# Patient Record
Sex: FEMALE | Race: WHITE | NOT HISPANIC OR LATINO | Employment: STUDENT | URBAN - METROPOLITAN AREA
[De-identification: names, ages, dates, MRNs, and addresses within clinical notes are randomized per-mention and may not be internally consistent; named-entity substitution may affect disease eponyms.]

---
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
## 2019-01-30 ENCOUNTER — HOSPITAL ENCOUNTER (EMERGENCY)
Facility: HOSPITAL | Age: 19
Discharge: HOME | End: 2019-01-31
Attending: EMERGENCY MEDICINE
Payer: MEDICAID

## 2019-01-30 VITALS
HEIGHT: 62 IN | BODY MASS INDEX: 45.82 KG/M2 | DIASTOLIC BLOOD PRESSURE: 89 MMHG | RESPIRATION RATE: 16 BRPM | OXYGEN SATURATION: 98 % | TEMPERATURE: 98 F | HEART RATE: 100 BPM | WEIGHT: 249 LBS | SYSTOLIC BLOOD PRESSURE: 132 MMHG

## 2019-01-30 DIAGNOSIS — R10.9 LEFT FLANK PAIN: ICD-10-CM

## 2019-01-30 DIAGNOSIS — N94.89 FEMALE PELVIC CONGESTION SYNDROME: Primary | ICD-10-CM

## 2019-01-30 LAB
B-HCG UR QL: NEGATIVE
BACTERIA #/AREA URNS AUTO: ABNORMAL /HPF
BILIRUB UR QL STRIP: NEGATIVE
CLARITY UR REFRACT.AUTO: ABNORMAL
COLOR UR AUTO: YELLOW
CTP QC/QA: YES
CTP QC/QA: YES
GLUCOSE UR QL STRIP: NEGATIVE
HGB UR QL STRIP: NEGATIVE
HYALINE CASTS UR QL AUTO: 0 /LPF
KETONES UR QL STRIP: NEGATIVE
LEUKOCYTE ESTERASE UR QL STRIP: NEGATIVE
MICROSCOPIC COMMENT: ABNORMAL
NITRITE UR QL STRIP: NEGATIVE
PH UR STRIP: 5 [PH] (ref 5–8)
POC MOLECULAR INFLUENZA A AGN: NEGATIVE
POC MOLECULAR INFLUENZA B AGN: NEGATIVE
PROT UR QL STRIP: ABNORMAL
RBC #/AREA URNS AUTO: 4 /HPF (ref 0–4)
SP GR UR STRIP: 1.03 (ref 1–1.03)
SQUAMOUS #/AREA URNS AUTO: 5 /HPF
URN SPEC COLLECT METH UR: ABNORMAL
WBC #/AREA URNS AUTO: 7 /HPF (ref 0–5)

## 2019-01-30 PROCEDURE — 99284 EMERGENCY DEPT VISIT MOD MDM: CPT | Mod: ,,, | Performed by: EMERGENCY MEDICINE

## 2019-01-30 PROCEDURE — 81025 URINE PREGNANCY TEST: CPT | Performed by: EMERGENCY MEDICINE

## 2019-01-30 PROCEDURE — 99284 PR EMERGENCY DEPT VISIT,LEVEL IV: ICD-10-PCS | Mod: ,,, | Performed by: EMERGENCY MEDICINE

## 2019-01-30 PROCEDURE — 81001 URINALYSIS AUTO W/SCOPE: CPT

## 2019-01-30 PROCEDURE — 99284 EMERGENCY DEPT VISIT MOD MDM: CPT | Mod: 25

## 2019-01-30 PROCEDURE — 96365 THER/PROPH/DIAG IV INF INIT: CPT

## 2019-01-30 RX ORDER — SODIUM CHLORIDE 9 MG/ML
1000 INJECTION, SOLUTION INTRAVENOUS
Status: COMPLETED | OUTPATIENT
Start: 2019-01-30 | End: 2019-01-31

## 2019-01-31 LAB
ANION GAP SERPL CALC-SCNC: 13 MMOL/L
BACTERIA GENITAL QL WET PREP: ABNORMAL
BASOPHILS # BLD AUTO: 0.02 K/UL
BASOPHILS NFR BLD: 0.2 %
BUN SERPL-MCNC: 7 MG/DL
C TRACH DNA SPEC QL NAA+PROBE: NOT DETECTED
CALCIUM SERPL-MCNC: 9.5 MG/DL
CHLORIDE SERPL-SCNC: 107 MMOL/L
CLUE CELLS VAG QL WET PREP: ABNORMAL
CO2 SERPL-SCNC: 21 MMOL/L
CREAT SERPL-MCNC: 0.6 MG/DL
DIFFERENTIAL METHOD: ABNORMAL
EOSINOPHIL # BLD AUTO: 0.1 K/UL
EOSINOPHIL NFR BLD: 0.8 %
ERYTHROCYTE [DISTWIDTH] IN BLOOD BY AUTOMATED COUNT: 13.9 %
EST. GFR  (AFRICAN AMERICAN): >60 ML/MIN/1.73 M^2
EST. GFR  (NON AFRICAN AMERICAN): >60 ML/MIN/1.73 M^2
FILAMENT FUNGI VAG WET PREP-#/AREA: ABNORMAL
GLUCOSE SERPL-MCNC: 79 MG/DL
HCT VFR BLD AUTO: 42.9 %
HGB BLD-MCNC: 13.5 G/DL
IMM GRANULOCYTES # BLD AUTO: 0.02 K/UL
IMM GRANULOCYTES NFR BLD AUTO: 0.2 %
LYMPHOCYTES # BLD AUTO: 3.2 K/UL
LYMPHOCYTES NFR BLD: 33.7 %
MCH RBC QN AUTO: 26.4 PG
MCHC RBC AUTO-ENTMCNC: 31.5 G/DL
MCV RBC AUTO: 84 FL
MONOCYTES # BLD AUTO: 0.5 K/UL
MONOCYTES NFR BLD: 5.5 %
N GONORRHOEA DNA SPEC QL NAA+PROBE: NOT DETECTED
NEUTROPHILS # BLD AUTO: 5.6 K/UL
NEUTROPHILS NFR BLD: 59.6 %
NRBC BLD-RTO: 0 /100 WBC
PLATELET # BLD AUTO: 213 K/UL
PMV BLD AUTO: 12.2 FL
POTASSIUM SERPL-SCNC: 3.2 MMOL/L
RBC # BLD AUTO: 5.12 M/UL
SODIUM SERPL-SCNC: 141 MMOL/L
SPECIMEN SOURCE: ABNORMAL
T VAGINALIS GENITAL QL WET PREP: ABNORMAL
WBC # BLD AUTO: 9.44 K/UL
WBC #/AREA VAG WET PREP: ABNORMAL
YEAST GENITAL QL WET PREP: ABNORMAL

## 2019-01-31 PROCEDURE — 25000003 PHARM REV CODE 250: Performed by: EMERGENCY MEDICINE

## 2019-01-31 PROCEDURE — 80048 BASIC METABOLIC PNL TOTAL CA: CPT

## 2019-01-31 PROCEDURE — 85025 COMPLETE CBC W/AUTO DIFF WBC: CPT

## 2019-01-31 PROCEDURE — 87210 SMEAR WET MOUNT SALINE/INK: CPT

## 2019-01-31 PROCEDURE — 87491 CHLMYD TRACH DNA AMP PROBE: CPT

## 2019-01-31 PROCEDURE — 63600175 PHARM REV CODE 636 W HCPCS: Performed by: EMERGENCY MEDICINE

## 2019-01-31 RX ADMIN — SODIUM CHLORIDE 1000 ML: 0.9 INJECTION, SOLUTION INTRAVENOUS at 12:01

## 2019-01-31 RX ADMIN — PROMETHAZINE HYDROCHLORIDE 12.5 MG: 25 INJECTION INTRAMUSCULAR; INTRAVENOUS at 12:01

## 2019-01-31 NOTE — ED PROVIDER NOTES
"Encounter Date: 1/30/2019       History     Chief Complaint   Patient presents with    Pelvic Pain     Pt to ER c/o left sided "ovary" pain. She states hx of endometriosis. Pt also c/o cough, congestion, subjective fevers and vomiting over the past week.     MARCELLA Nowak is a 19 yo female o/w healthy here for L sided ovarian pain that started 2 days ago and for reevaluation of URI  That was diagnosed 3 days ago at PCP. Was given rx for multiple medications at PCP but patient reports she cant keep any of them down. She reports last urinated a large amount yesterday. Isnt able to keep water down. She denies urinary sx. Does report some nausea. Has very irregular periods, just started patch yesterday for birth control. She reports fever today but hasnt taken any medication for it.           Review of patient's allergies indicates:   Allergen Reactions    Aspartame Anaphylaxis    Bupropion Anaphylaxis    Chloral hydrate analogues Other (See Comments)     Adverse reaction per grandma    Imitrex [sumatriptan succinate] Anaphylaxis    Nitroglycerin Anaphylaxis    Reglan [metoclopramide hcl] Shortness Of Breath    Seroquel [quetiapine] Hallucinations    Tomato (solanum lycopersicum) Anaphylaxis and Shortness Of Breath    Unable to assess Anaphylaxis     Crabs/not allergic to iodine    Zantac [ranitidine hcl] Shortness Of Breath    Hibiclens (isopropyl alcohol) Itching    Abilify [aripiprazole]     Ambien [zolpidem]     Banana     Compazine [prochlorperazine edisylate]     Desmopressin     Eggplant     Honey     Kiwi (actinidia chinensis)     Lexapro [escitalopram oxalate]     Meclizine      hives    Remeron [mirtazapine]     Toradol [ketorolac]     Xanax [alprazolam]     Zofran [ondansetron hcl (pf)]     Mayonnaise Rash    Sulfa (sulfonamide antibiotics) Rash     Past Medical History:   Diagnosis Date    Asthma     Brain tumor, pineal gland     Depression     Depression     Depression in " pediatric patient 2014    Endometriosis     Otitis media recurrent    Precocious puberty     Pregnancy-induced hypertension in third trimester 3/3/2018    Seizures     Syncope, cardiogenic     Thyroid disease      Past Surgical History:   Procedure Laterality Date     SECTION      COLONOSCOPY N/A 2014    [facility]formed [facility] at [facility] (2ND FLR)    COLONOSCOPY W/ BIOPSIES      DELIVERY- SECTION N/A 2018    [facility] West [facility]    EGD (ESOPHAGOGASTRODUODENOSCOPY) N/A 2014    [facility] at [facility] (2ND FLR)    EGD (ESOPHAGOGASTRODUODENOSCOPY) N/A 10/11/2013    Performed by Hasmukh Caputo [facility] at [facility] END[facility] (2ND FLR)    ESOPHAGOGASTRODUODENOSCOPY      implantable cardiac monitor placement  2018    TONSILLECTOMY      TYMPANOSTOMY TUBE PLACEMENT       Family History   Problem Relation Age of Onset    Heart disease Mother     Hyperlipidemia Mother     Asthma Mother     Crohn's disease Mother 37        pending surgery. prior med; sulfasalazine    Depression Mother     Bipolar disorder Mother     Arthritis Mother     Anxiety disorder Mother     Other Mother         PTSD    Hypertension Maternal Grandmother     Asthma Maternal Grandmother     Depression Maternal Grandmother     Anxiety disorder Maternal Grandmother     Hypertension Paternal Grandmother     Diabetes Paternal Grandmother     Nephrolithiasis Father     Nephrolithiasis Brother     ADD / ADHD Brother     Depression Brother     Bipolar disorder Brother     Crohn's disease Maternal Grandfather     Alcohol abuse Paternal Grandfather     Breast cancer Neg Hx     Colon cancer Neg Hx     Ovarian cancer Neg Hx      Social History     Tobacco Use    Smoking status: Passive Smoke Exposure - Never Smoker    Smokeless tobacco: Never Used   Substance Use Topics    Alcohol use: No    Drug use: No     Review of Systems    Constitutional: Positive for activity change. Negative for fever.   HENT: Negative for congestion.    Respiratory: Negative for cough.    Gastrointestinal: Positive for abdominal pain, nausea and vomiting. Negative for diarrhea.   Genitourinary: Positive for decreased urine volume and vaginal discharge. Negative for dysuria.   Musculoskeletal: Negative for myalgias.   Skin: Negative for rash.       Physical Exam     Initial Vitals [01/30/19 2300]   BP Pulse Resp Temp SpO2   132/89 100 16 98.4 °F (36.9 °C) 98 %      MAP       --         Physical Exam    Vitals reviewed.  Constitutional: She appears well-developed and well-nourished. No distress.   HENT:   Head: Normocephalic.   Mouth/Throat: Oropharynx is clear and moist.   Eyes: Conjunctivae are normal.   Cardiovascular: Normal rate, regular rhythm, normal heart sounds and intact distal pulses.   Pulmonary/Chest: Breath sounds normal. No respiratory distress. She has no wheezes.   Abdominal: Soft. There is tenderness.   + TTP LLQ, no acute abdomen    Genitourinary: Vaginal discharge found.   Genitourinary Comments: Scant thin discharge noted on pelvic exam   Musculoskeletal: Normal range of motion.   Neurological: She is alert.   Skin: Skin is warm and dry. Capillary refill takes less than 2 seconds.   Psychiatric: She has a normal mood and affect.         ED Course   Procedures  Labs Reviewed   URINALYSIS, REFLEX TO URINE CULTURE - Abnormal; Notable for the following components:       Result Value    Appearance, UA Cloudy (*)     Protein, UA 1+ (*)     All other components within normal limits    Narrative:     Preferred Collection Type->Urine, Clean Catch   URINALYSIS MICROSCOPIC - Abnormal; Notable for the following components:    WBC, UA 7 (*)     Bacteria, UA Many (*)     All other components within normal limits    Narrative:     Preferred Collection Type->Urine, Clean Catch   POCT URINE PREGNANCY - Normal   C. TRACHOMATIS/N. GONORRHOEAE BY AMP DNA   CBC W/  AUTO DIFFERENTIAL   BASIC METABOLIC PANEL   POCT INFLUENZA A/B MOLECULAR          Imaging Results    None          Medical Decision Making:   History:   I obtained history from: someone other than patient.  Old Medical Records: I decided to obtain old medical records.  Initial Assessment:   Eliu presents for emergent evaluation of LLQ Pain and URI sx. Her exam is reassuring, she is non toxic appearing and not clinically dehydrated. Discussed plan with patient for labs, fluids, US and reevalaution.   Differential Diagnosis:   Ovarian cyst, torsion, PID, UTI  Clinical Tests:   Lab Tests: Ordered and Reviewed  Radiological Study: Ordered and Reviewed  ED Management:  Patient seen and examined, imaging and labs ordered. No emesis here. Labs all reassuring. Discussed results with patient for pelvic congestion syndrome- given that she just started her patch a few days ago, the change in the hormones may be causing sx. She feels much better. Discussed follow up.                       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 A diagnosis of Left flank pain was also pertinent to this visit.      Disposition:   Disposition: Discharged  Condition: Stable                        Jody Mena MD  02/02/19 [id number]

## 2019-01-31 NOTE — DISCHARGE INSTRUCTIONS
Family aware to return for persistent fever, development of respiratory distress, change in mental status, decreased UOP, or any other acute medical issue requiring immediate attention.

## 2019-02-19 ENCOUNTER — OFFICE VISIT (OUTPATIENT)
Dept: OBSTETRICS AND GYNECOLOGY | Facility: CLINIC | Age: 19
End: 2019-02-19
Payer: MEDICAID

## 2019-02-19 VITALS
SYSTOLIC BLOOD PRESSURE: 140 MMHG | HEIGHT: 63 IN | BODY MASS INDEX: 43.83 KG/M2 | DIASTOLIC BLOOD PRESSURE: 84 MMHG | WEIGHT: 247.38 LBS

## 2019-02-19 DIAGNOSIS — N94.6 DYSMENORRHEA: ICD-10-CM

## 2019-02-19 DIAGNOSIS — E66.3 OVERWEIGHT: Primary | ICD-10-CM

## 2019-02-19 PROBLEM — N30.00 ACUTE CYSTITIS WITHOUT HEMATURIA: Status: RESOLVED | Noted: 2018-05-03 | Resolved: 2019-02-19

## 2019-02-19 PROBLEM — Z34.93 PREGNANCY WITH ONE FETUS IN THIRD TRIMESTER: Status: RESOLVED | Noted: 2018-01-24 | Resolved: 2019-02-19

## 2019-02-19 PROBLEM — Z98.891 S/P CESAREAN SECTION: Status: RESOLVED | Noted: 2018-02-28 | Resolved: 2019-02-19

## 2019-02-19 PROBLEM — Z34.90 PREGNANCY WITH ONE FETUS: Status: RESOLVED | Noted: 2018-02-07 | Resolved: 2019-02-19

## 2019-02-19 PROCEDURE — 99213 OFFICE O/P EST LOW 20 MIN: CPT | Mod: S$PBB,,, | Performed by: STUDENT IN AN ORGANIZED HEALTH CARE EDUCATION/TRAINING PROGRAM

## 2019-02-19 PROCEDURE — 99999 PR PBB SHADOW E&M-EST. PATIENT-LVL II: ICD-10-PCS | Mod: PBBFAC,,, | Performed by: STUDENT IN AN ORGANIZED HEALTH CARE EDUCATION/TRAINING PROGRAM

## 2019-02-19 PROCEDURE — 99213 PR OFFICE/OUTPT VISIT, EST, LEVL III, 20-29 MIN: ICD-10-PCS | Mod: S$PBB,,, | Performed by: STUDENT IN AN ORGANIZED HEALTH CARE EDUCATION/TRAINING PROGRAM

## 2019-02-19 PROCEDURE — 99999 PR PBB SHADOW E&M-EST. PATIENT-LVL II: CPT | Mod: PBBFAC,,, | Performed by: STUDENT IN AN ORGANIZED HEALTH CARE EDUCATION/TRAINING PROGRAM

## 2019-02-19 PROCEDURE — 99212 OFFICE O/P EST SF 10 MIN: CPT | Mod: PBBFAC,PO | Performed by: STUDENT IN AN ORGANIZED HEALTH CARE EDUCATION/TRAINING PROGRAM

## 2019-02-19 RX ORDER — DEXAMETHASONE 0.75 MG/1
TABLET ORAL
COMMUNITY
Start: 2019-02-18 | End: 2023-12-15

## 2019-02-19 RX ORDER — CIPROFLOXACIN 500 MG/1
TABLET ORAL
COMMUNITY
Start: 2019-02-18 | End: 2023-12-15

## 2019-02-19 RX ORDER — NORETHINDRONE 5 MG/1
5 TABLET ORAL DAILY
Qty: 30 TABLET | Refills: 11 | Status: SHIPPED | OUTPATIENT
Start: 2019-02-19 | End: 2019-02-19 | Stop reason: DRUGHIGH

## 2019-02-19 RX ORDER — PROMETHAZINE HYDROCHLORIDE 25 MG/1
TABLET ORAL
Refills: 3 | COMMUNITY
Start: 2019-01-28 | End: 2019-02-22 | Stop reason: SDUPTHER

## 2019-02-19 RX ORDER — NORETHINDRONE 5 MG/1
10 TABLET ORAL DAILY
Qty: 60 TABLET | Refills: 11 | Status: SHIPPED | OUTPATIENT
Start: 2019-02-19 | End: 2019-02-22

## 2019-02-19 NOTE — PROGRESS NOTES
History & Physical  Gynecology      SUBJECTIVE:     Chief Complaint: Consult (Endometriosis) and Pelvic Pain (3/10 due to left ovary)       History of Present Illness:  Eliu Hunter is a 18 y.o. [id number] who presents with pelvic pain. Patient reports that she as diagnosed with endometriosis 4 years ago during exploratory laparoscopy. She has daily pain which rates anywhere from 4-10/10 however, the pain is constant regardless and there is no time throughout her day where she has no pain. At times, she finds it difficult to get out of bed because of the pain. She has tried many different medications including OCPs, birth control patch and progesterone for her symptoms with little relief. She was on Lupron in the past however, this was for the treatment of precocious puberty. Currently she is on Xulane currently for contraceptive purposes however, does not feel that it helps with her pain or menstrual cycles. 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 She reports menses occurs 4-5x week with her cycles lasting 4-5 days at times while at other times, it lasts 2 weeks. She uses 7 tampons/hour at times to control her bleeding. [place] has been difficult for her given her persistent bleeding and pelvic pain. She denies any light headedness, dizziness or weakness. Additionally, she has had multiple colonoscopies performed secondary to persistent nausea and vomiting. She has seen several doctors including Dr. Trujillo and Dr. Castanon at [facility] for this problem with little relief and the patient is very unhappy that she continues to suffer.     Review of patient's allergies indicates:   Allergen Reactions    Aspartame Anaphylaxis     Other reaction(s): UNKNOWN    Bupropion Anaphylaxis    Chloral hydrate analogues Other (See Comments)     Adverse reaction per grandma    Imitrex [sumatriptan succinate] Anaphylaxis    Nitroglycerin Anaphylaxis     Other reaction(s):  stop breathing    Quetiapine Hallucinations     Other reaction(s): HALLUCINATION    Reglan [metoclopramide hcl] Shortness Of Breath    Tomato (solanum lycopersicum) Anaphylaxis and Shortness Of Breath    Unable to assess Anaphylaxis     Crabs/not allergic to iodine    Zantac [ranitidine hcl] Shortness Of Breath    Hibiclens (isopropyl alcohol) Itching    Abilify [aripiprazole]     Ambien [zolpidem]     Banana      Other reaction(s): STOP BREATHING    Chloral hydrate      Other reaction(s): adverse affect    Compazine [prochlorperazine edisylate]     Crab      Other reaction(s): STOP BREATHING    Desmopressin     Eggplant      Other reaction(s): EGG PLANT    Honey      Other reaction(s): LIP SWELLING    Ketorolac      Other reaction(s): RASH    Kiwi (actinidia chinensis)     Lexapro [escitalopram oxalate]     Meclizine      hives    Metoclopramide      Other reaction(s): SHORTNESS OF BREATH    Ondansetron      Other reaction(s): RASH    Prochlorperazine      Other reaction(s): RASH    Remeron [mirtazapine]     Sucralose      Other reaction(s): STOPS BREATHING    Tomato      Other reaction(s): stop breathing    Xanax [alprazolam]     Zofran [ondansetron hcl (pf)]     Mayonnaise Rash     Other reaction(s): STOP BREATHING    Sulfa (sulfonamide antibiotics) Rash and Nausea And Vomiting       Past Medical History:   Diagnosis Date    Asthma     Brain tumor, pineal gland     Depression     Endometriosis     Precocious puberty     Seizures     Syncope, cardiogenic     Thyroid disease      Past Surgical History:   Procedure Laterality Date     SECTION      COLONOSCOPY N/A 2014    [facility]    COLONOSCOPY W/ BIOPSIES      DELIVERY- SECTION N/A 2018    [facility] at [facility] L[facility]D [facility]    EGD (ESOPHAGOGASTRODUODENOSCOPY) N/A 2014    [facility] (2ND FLR)     EGD (ESOPHAGOGASTRODUODENOSCOPY) N/A 10/11/2013    [facility] ([facility]    ESOPHAGOGASTRODUODENOSCOPY      implantable cardiac monitor placement  2018    TONSILLECTOMY      TYMPANOSTOMY TUBE PLACEMENT       OB History      Para Term  AB Living    1 1 1   0 1    SAB TAB Ectopic Multiple Live Births    0     0 1        Family History   Problem Relation Age of Onset    Heart disease Mother     Hyperlipidemia Mother     Asthma Mother     Crohn's disease Mother 37        pending surgery. prior med; sulfasalazine    Depression Mother     Bipolar disorder Mother     Arthritis Mother     Anxiety disorder Mother     Other Mother         PTSD    Hypertension Maternal Grandmother     Asthma Maternal Grandmother     Depression Maternal Grandmother     Anxiety disorder Maternal Grandmother     Hypertension Paternal Grandmother     Diabetes Paternal Grandmother     Nephrolithiasis Father     Nephrolithiasis Brother     ADD / ADHD Brother     Depression Brother     Bipolar disorder Brother     Crohn's disease Maternal Grandfather     Alcohol abuse Paternal Grandfather     Breast cancer Neg Hx     Colon cancer Neg Hx     Ovarian cancer Neg Hx      Social History     Tobacco Use    Smoking status: Passive Smoke Exposure - Never Smoker    Smokeless tobacco: Never Used   Substance Use Topics    Alcohol use: No    Drug use: No       Current Outpatient Medications   Medication Sig    ciprofloxacin HCl (CIPRO) 500 MG tablet     clindamycin (CLEOCIN) 300 MG capsule Take 300 mg by mouth every 6 (six) hours.    dexamethasone (DECADRON) 0.75 MG Tab     epinephrine (EPIPEN INJ) Inject as directed.    HYDROcodone-acetaminophen (NORCO) 7.5-325 mg per tablet Take 1 tablet by mouth every 6 (six) hours as needed for Pain.    meclizine (ANTIVERT) 25 mg tablet Take 1 tablet (25 mg total) by mouth 3 (three) times daily as needed for Nausea.     metoprolol succinate (TOPROL-XL) 25 MG 24 hr tablet Take 1 tablet (25 mg total) by mouth once daily.    polyethylene glycol (COLYTE) 240-22.72-6.72 -5.84 gram SolR TK UTD    promethazine (PHENERGAN) 12.5 MG Tab Take 12.5 mg by mouth as needed.    promethazine (PHENERGAN) 25 MG tablet TK 1 T PO  Q 6 H PRN N    PROVENTIL HFA 90 mcg/actuation inhaler INL 2 PFS PO Q 4 H PRF COUGH OR WHZ    sumatriptan (IMITREX STATDOSE) 6 mg/0.5 mL kit Inject 0.5 mLs (6 mg total) into the skin once.    traMADol (ULTRAM) 50 mg tablet Take 50 mg by mouth as needed.    XULANE 150-35 mcg/24 hr LISA 1 PA EXT TO THE SKIN WEEKLY FOR 21 DAYS     No current facility-administered medications for this visit.          Review of Systems:  Review of Systems   Constitutional: Negative for chills, diaphoresis and fever.   Respiratory: Negative for cough, shortness of breath and wheezing.    Cardiovascular: Negative for chest pain, palpitations and leg swelling.   Gastrointestinal: Negative for abdominal pain, constipation, diarrhea and nausea.   Genitourinary: Positive for menstrual problem, pelvic pain and vaginal bleeding. Negative for dysuria, hematuria, vaginal discharge, vaginal pain, vaginal dryness and vaginal odor.   Musculoskeletal: Negative for back pain.   Neurological: Negative for syncope and headaches.        OBJECTIVE:     Physical Exam:  Physical Exam   Constitutional: She is oriented to person, place, and time. She appears well-developed and well-nourished.   HENT:   Head: Normocephalic and atraumatic.   Nose: Nose normal.   Cardiovascular: Normal rate, regular rhythm and intact distal pulses.   Pulmonary/Chest: Effort normal and breath sounds normal. No respiratory distress.   Abdominal: Soft. She exhibits no distension. There is no tenderness.   Genitourinary:   Genitourinary Comments: Deferred   Neurological: She is oriented to person, place, and time. No cranial nerve deficit.   Skin: Skin is warm and dry. Capillary refill  takes less than 2 seconds.   Psychiatric: She has a normal mood and affect. Her behavior is normal. Judgment and thought content normal.   Vitals reviewed.        ASSESSMENT:       ICD-10-CM ICD-9-CM    1. Overweight E66.3 278.02    2. Dysmenorrhea N94.6 625.3    3. Precocious puberty E30.1 259.1           Plan:      1. Dysmenorrhea  - Patient presenting with painful monthly periods on setting of dysmenorrhea and menorrhagia and endometriosis diagnosed via laparoscopy, per patient  - VSS, Last H/h in 01/2019: 13.5/42.9  - Currently patient using Xulane for contraception  - Pelvic US report reviewed: suggestive for pelvic congestion syndrome   - Advised patient to continue use of Xulane continuously for endometriosis and contraceptive purposes but to also start Aygestin 10 mg daily for treatment of pelvic congestion syndrome  - Will re-evaluate in 3 months for improvement of symptoms    Return to clinic in 3 months for further evaluation and for follow up of symptom improvement

## 2019-02-22 ENCOUNTER — OFFICE VISIT (OUTPATIENT)
Dept: ELECTROPHYSIOLOGY | Facility: CLINIC | Age: 19
End: 2019-02-22
Payer: MEDICAID

## 2019-02-22 ENCOUNTER — HOSPITAL ENCOUNTER (OUTPATIENT)
Dept: CARDIOLOGY | Facility: CLINIC | Age: 19
Discharge: HOME | End: 2019-02-22
Payer: MEDICAID

## 2019-02-22 VITALS
SYSTOLIC BLOOD PRESSURE: 140 MMHG | HEIGHT: 62 IN | WEIGHT: 248 LBS | BODY MASS INDEX: 45.64 KG/M2 | DIASTOLIC BLOOD PRESSURE: 92 MMHG | HEART RATE: 78 BPM

## 2019-02-22 DIAGNOSIS — R51.9 CHRONIC NONINTRACTABLE HEADACHE, UNSPECIFIED HEADACHE TYPE: Primary | ICD-10-CM

## 2019-02-22 DIAGNOSIS — R55 SYNCOPE, UNSPECIFIED SYNCOPE TYPE: ICD-10-CM

## 2019-02-22 DIAGNOSIS — R55 SYNCOPE AND COLLAPSE: ICD-10-CM

## 2019-02-22 DIAGNOSIS — D49.6 BRAIN TUMOR: ICD-10-CM

## 2019-02-22 DIAGNOSIS — R55 VASOVAGAL SYNCOPE: ICD-10-CM

## 2019-02-22 DIAGNOSIS — Z98.890 HISTORY OF LOOP RECORDER: ICD-10-CM

## 2019-02-22 DIAGNOSIS — R00.2 PALPITATIONS: ICD-10-CM

## 2019-02-22 DIAGNOSIS — G89.29 CHRONIC NONINTRACTABLE HEADACHE, UNSPECIFIED HEADACHE TYPE: Primary | ICD-10-CM

## 2019-02-22 PROCEDURE — 93010 RHYTHM STRIP: ICD-10-PCS | Mod: S$PBB,,, | Performed by: INTERNAL MEDICINE

## 2019-02-22 PROCEDURE — 99214 PR OFFICE/OUTPT VISIT, EST, LEVL IV, 30-39 MIN: ICD-10-PCS | Mod: S$PBB,,, | Performed by: INTERNAL MEDICINE

## 2019-02-22 PROCEDURE — 99214 OFFICE O/P EST MOD 30 MIN: CPT | Mod: S$PBB,,, | Performed by: INTERNAL MEDICINE

## 2019-02-22 PROCEDURE — 99999 PR PBB SHADOW E&M-EST. PATIENT-LVL III: ICD-10-PCS | Mod: PBBFAC,,, | Performed by: INTERNAL MEDICINE

## 2019-02-22 PROCEDURE — 99213 OFFICE O/P EST LOW 20 MIN: CPT | Mod: PBBFAC,25 | Performed by: INTERNAL MEDICINE

## 2019-02-22 PROCEDURE — 93005 ELECTROCARDIOGRAM TRACING: CPT | Mod: PBBFAC | Performed by: INTERNAL MEDICINE

## 2019-02-22 PROCEDURE — 99999 PR PBB SHADOW E&M-EST. PATIENT-LVL III: CPT | Mod: PBBFAC,,, | Performed by: INTERNAL MEDICINE

## 2019-02-22 PROCEDURE — 93010 ELECTROCARDIOGRAM REPORT: CPT | Mod: S$PBB,,, | Performed by: INTERNAL MEDICINE

## 2019-02-22 RX ORDER — METOPROLOL SUCCINATE 25 MG/1
25 TABLET, EXTENDED RELEASE ORAL DAILY
Qty: 90 TABLET | Refills: 3 | Status: SHIPPED | OUTPATIENT
Start: 2019-02-22 | End: 2019-06-19 | Stop reason: SDUPTHER

## 2019-02-22 RX ORDER — GENTAMICIN SULFATE 3 MG/ML
SOLUTION/ DROPS OPHTHALMIC
Refills: 2 | COMMUNITY
Start: 2019-02-18 | End: 2023-12-15

## 2019-02-22 NOTE — PROGRESS NOTES
Subjective:    Patient ID:  Eliu Hunter is a 18 y.o. female who presents for evaluation of Vasovagal Syncope      Chest Pain    Associated symptoms include palpitations. Pertinent negatives include no abdominal pain, dizziness, malaise/fatigue, near-syncope, shortness of breath, syncope or weakness.   Pertinent negatives for past medical history include no muscle weakness.      18 y.o. 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 Sometimes has very little prodrome, if any.  Happened up to 2x/week. No change with pregnancy.  Usually while standing, but has had LOC twice while lying down.  Once, hit head with syncope. She doesn't drive , due to sync hx.    Also has palpitations at times, especially at night.  Also c/o severe episodic HA, focused on L side of head and retroorbital. She says it feels similar to the HAs she was getting when she had the pineal tumor at age 6.    30-day monitor: SR/ST, including during sx. No syncope during that period.  Neuro eval is underway. By report, considering further imaging and  shunt (?). She'd like to see an [facility] neurologist.  BP has been in 140s, with fluctuations.    [facility] done by Dr [facility] at [facility]: baseline tilt negative. SL NTG -> hypotension, HR 68, LOC.  echo 55-60%. normal LA.  Holter: SR/ST. Longest RR: 1670 ms.  ILR: SR/ST.    My interpretation of today's ECG is ST 110s      Review of Systems   Constitution: Negative. Negative for weakness and malaise/fatigue.   HENT: Negative.  Negative for ear pain and tinnitus.    Eyes: Negative for blurred vision.   Cardiovascular: Positive for palpitations. Negative for dyspnea on exertion, near-syncope and syncope.   Respiratory: Negative.  Negative for shortness of breath.    Endocrine: Negative.  Negative for polyuria.    Hematologic/Lymphatic: Does not bruise/bleed easily.   Skin: Negative.  Negative for rash.   Musculoskeletal: Negative.  Negative for joint pain and muscle weakness.   Gastrointestinal: Negative.  Negative for abdominal pain and change in bowel habit.   Genitourinary: Negative for frequency.   Neurological: Negative.  Negative for dizziness.   Psychiatric/Behavioral: Negative.  Negative for depression. The patient is not nervous/anxious.    Allergic/Immunologic: Negative for environmental allergies.        Objective:    Physical Exam   Constitutional: She is oriented to person, place, and time. Vital signs are normal. She appears well-developed and well-nourished. She is active and cooperative.   HENT:   Head: Normocephalic and atraumatic.   Eyes: Conjunctivae and EOM are normal.   Neck: Normal range of motion. Carotid bruit is not present. No tracheal deviation and no edema present. No thyroid mass and no thyromegaly present.   Cardiovascular: Normal rate, regular rhythm, normal heart sounds, intact distal pulses and normal pulses.  No extrasystoles are present. PMI is not displaced. Exam reveals no gallop and no friction rub.   No murmur heard.  Pulmonary/Chest: Effort normal and breath sounds normal. No respiratory distress. She has no wheezes. She has no rales.   Abdominal: Soft. Normal appearance. She exhibits no distension. There is no hepatosplenomegaly.   gravid abd   Musculoskeletal: Normal range of motion.   Neurological: She is alert and oriented to person, place, and time. Coordination normal.   Skin: Skin is warm and dry. No rash noted.   Psychiatric: She has a normal mood and affect. Her speech is normal and behavior is normal. Thought content normal. Cognition and memory are normal.   Nursing note and vitals reviewed.        Assessment:       1. Chronic nonintractable headache, unspecified headache type    2. Brain tumor         Plan:       Most likely vasovagal, given normal heart, normal ECG.  However, very short prodrome makes arrhythmic cause possible too. Nothing on 30-day monitor or ILR.  Continue to follow on ILR.    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 Referral to an [facility] neurologist, per pt request.    HTN, relative tachycardia, palpitations: trial of low-dose BB.    Return in 1 year, or earlier prn.

## 2019-02-25 ENCOUNTER — PATIENT MESSAGE (OUTPATIENT)
Dept: OBSTETRICS AND GYNECOLOGY | Facility: CLINIC | Age: 19
End: 2019-02-25

## 2019-02-25 ENCOUNTER — CLINICAL SUPPORT (OUTPATIENT)
Dept: CARDIOLOGY | Facility: HOSPITAL | Age: 19
End: 2019-02-25
Attending: INTERNAL MEDICINE
Payer: MEDICAID

## 2019-02-25 DIAGNOSIS — R55 SYNCOPE AND COLLAPSE: Primary | ICD-10-CM

## 2019-02-25 DIAGNOSIS — Z95.818 STATUS POST PLACEMENT OF IMPLANTABLE LOOP RECORDER: ICD-10-CM

## 2019-02-25 PROCEDURE — 93299 CARDIAC DEVICE CHECK - REMOTE: CPT

## 2019-03-26 ENCOUNTER — HOSPITAL ENCOUNTER (EMERGENCY)
Facility: HOSPITAL | Age: 19
Discharge: HOME | End: 2019-03-26
Attending: INTERNAL MEDICINE
Payer: MEDICAID

## 2019-03-26 VITALS
DIASTOLIC BLOOD PRESSURE: 77 MMHG | BODY MASS INDEX: 45.08 KG/M2 | SYSTOLIC BLOOD PRESSURE: 140 MMHG | WEIGHT: 245 LBS | TEMPERATURE: 98 F | OXYGEN SATURATION: 99 % | HEIGHT: 62 IN | RESPIRATION RATE: 20 BRPM | HEART RATE: 108 BPM

## 2019-03-26 DIAGNOSIS — R07.89 LEFT-SIDED CHEST WALL PAIN: Primary | ICD-10-CM

## 2019-03-26 LAB
B-HCG UR QL: NEGATIVE
CTP QC/QA: YES

## 2019-03-26 PROCEDURE — 99283 EMERGENCY DEPT VISIT LOW MDM: CPT | Mod: ER

## 2019-03-26 PROCEDURE — 81025 URINE PREGNANCY TEST: CPT | Mod: ER | Performed by: INTERNAL MEDICINE

## 2019-03-27 NOTE — ED PROVIDER NOTES
"Encounter Date: 3/26/2019    SCRIBE #1 NOTE: I, Elissa Bowser, am scribing for, and in the presence of,  Dr. Forrest. I have scribed the following portions of the note - Other sections scribed: HPI, ROS, PE.       History     Chief Complaint   Patient presents with    Chest Pain     pt presents with c/o SOB/L, sided CP which began 20 min ago; reports pain worsens with cough, deep breathing, palpation; pt reports productive cough with yellow sputum x3.5 weeks; pt also c/o R, ear pain; recent Dx of ear infection; pt reports vomiting x2 episodes since CP began; pt reports lightheadedness; pain 7/10 on pain scale; pain described as pressure radiating down LUE and up to L, side of neck; Hx of Neurogenic Cardiavascular Syncope; denies recent syncopal episodes     Eliu Hunter is a 19 y.o. female who presents to the ED complaining of CP that began 30 min ago. She reports that it's a 6/10, and describes it as "pressure" that is worsened by pressing on the area and by deep breathing. She also has a cough, but denies smoking.        Review of patient's allergies indicates:   Allergen Reactions    Advair diskus [fluticasone-salmeterol] Anaphylaxis    Aspartame Anaphylaxis     Other reaction(s): UNKNOWN    Bupropion Anaphylaxis    Chloral hydrate analogues Other (See Comments)     Adverse reaction per grandma    Imitrex [sumatriptan succinate] Anaphylaxis    Ugo Anaphylaxis    Metoprolol Shortness Of Breath    Nitroglycerin Anaphylaxis     Other reaction(s): stop breathing    Quetiapine Hallucinations     Other reaction(s): HALLUCINATION    Reglan [metoclopramide hcl] Shortness Of Breath    Tomato (solanum lycopersicum) Anaphylaxis and Shortness Of Breath    Unable to assess Anaphylaxis     Crabs/not allergic to iodine    Zantac [ranitidine hcl] Shortness Of Breath    Hibiclens (isopropyl alcohol) Itching    Abilify [aripiprazole]     Ambien [zolpidem]     Banana      Other reaction(s): STOP BREATHING    " Chloral hydrate      Other reaction(s): adverse affect    Compazine [prochlorperazine edisylate]     Crab      Other reaction(s): STOP BREATHING    Desmopressin     Eggplant      Other reaction(s): EGG PLANT    Honey      Other reaction(s): LIP SWELLING    Ketorolac      Other reaction(s): RASH    Kiwi (actinidia chinensis)     Lexapro [escitalopram oxalate]     Meclizine      hives    Metoclopramide      Other reaction(s): SHORTNESS OF BREATH    Ondansetron      Other reaction(s): RASH    Prochlorperazine      Other reaction(s): RASH    Remeron [mirtazapine]     Sucralose      Other reaction(s): STOPS BREATHING    Tomato      Other reaction(s): stop breathing    Xanax [alprazolam]     Zofran [ondansetron hcl (pf)]     Mayonnaise Rash     Other reaction(s): STOP BREATHING    Sulfa (sulfonamide antibiotics) Rash and Nausea And Vomiting     Past Medical History:   Diagnosis Date    Asthma     Brain tumor, pineal gland     Depression     Endometriosis     Hypertension     Precocious puberty     Seizures     Syncope, cardiogenic     Thyroid disease      Past Surgical History:   Procedure Laterality Date     SECTION      COLONOSCOPY N/A 2014    [facility]    COLONOSCOPY W/ BIOPSIES      DELIVERY- SECTION N/A 2018    Performed by West Garcia [facility]    EGD (ESOPHAGOGASTRODUODENOSCOPY) N/A 2014    [facility] (2ND FLR)    EGD (ESOPHAGOGASTRODUODENOSCOPY) N/A 10/11/2013    [facility] (2ND FLR)    ESOPHAGOGASTRODUODENOSCOPY      implantable cardiac monitor placement  2018    TONSILLECTOMY      TYMPANOSTOMY TUBE PLACEMENT       Family History   Problem Relation Age of Onset    Heart disease Mother     Hyperlipidemia Mother     Asthma Mother     Crohn's disease Mother 37        pending surgery. prior med;  sulfasalazine    Depression Mother     Bipolar disorder Mother     Arthritis Mother     Anxiety disorder Mother     Other Mother         PTSD    Hypertension Maternal Grandmother     Asthma Maternal Grandmother     Depression Maternal Grandmother     Anxiety disorder Maternal Grandmother     Hypertension Paternal Grandmother     Diabetes Paternal Grandmother     Nephrolithiasis Father     Nephrolithiasis Brother     ADD / ADHD Brother     Depression Brother     Bipolar disorder Brother     Crohn's disease Maternal Grandfather     Alcohol abuse Paternal Grandfather     Breast cancer Neg Hx     Colon cancer Neg Hx     Ovarian cancer Neg Hx      Social History     Tobacco Use    Smoking status: Passive Smoke Exposure - Never Smoker    Smokeless tobacco: Never Used   Substance Use Topics    Alcohol use: No    Drug use: No     Review of Systems   Respiratory: Positive for cough.    Cardiovascular: Positive for chest pain.   All other systems reviewed and are negative.      Physical Exam     Initial Vitals [03/26/19 2229]   BP Pulse Resp Temp SpO2   (!) 146/93 109 18 98.3 °F (36.8 °C) 100 %      MAP       --         Physical Exam    Nursing note and vitals reviewed.  Constitutional: She appears well-developed and well-nourished. She is Obese .   HENT:   Head: Normocephalic and atraumatic.   Right Ear: External ear normal.   Left Ear: External ear normal.   Nose: Nose normal.   Eyes: Conjunctivae are normal.   Neck: Normal range of motion. Neck supple.   Cardiovascular: Normal rate and intact distal pulses.   Pulmonary/Chest: Effort normal and breath sounds normal. No respiratory distress. She has no wheezes. She has no rhonchi. She has no rales. She exhibits tenderness (left chest wall).   Lungs clear bilaterally.   Abdominal: Soft. There is no tenderness.   Musculoskeletal: Normal range of motion.   Neurological: She is alert and oriented to person, place, and time.   Skin: Skin is warm and dry.  "Capillary refill takes less than 2 seconds.   Psychiatric: She has a normal mood and affect. Her behavior is normal.         ED Course   Procedures  Labs Reviewed   POCT URINE PREGNANCY     EKG Readings: (Independently Interpreted)   Initial Reading: No STEMI. Rhythm: Sinus Tachycardia. Heart Rate: 109. Ectopy: No Ectopy. Conduction: Normal. ST Segments: Normal ST Segments.       Imaging Results    None          Medical Decision Making:   History:   Old Medical Records: I decided to obtain old medical records.  Initial Assessment:   Eliu Hunter is a 19 y.o. female who presents to the ED complaining of CP that began 30 min ago. She reports that it's a 6/10, and describes it as "pressure" that is worsened by pressing on the area and by deep breathing. She also has a cough, but denies smoking.  Clinical Tests:   Lab Tests: Ordered and Reviewed            Scribe Attestation:   Scribe #1: I performed the above scribed service and the documentation accurately describes the services I performed. I attest to the accuracy of the note.    This document was produced by a scribe under my direction and in my presence. I agree with the content of the note and have made any necessary edits.     Dr. Forrest    03/27/2019 2:20 AM]           Clinical Impression:     1. Left-sided chest wall pain            Disposition:   Disposition: Discharged  Condition: Stable                        Andre Forrest MD  03/27/19 0221    "

## 2019-04-03 ENCOUNTER — PATIENT MESSAGE (OUTPATIENT)
Dept: OBSTETRICS AND GYNECOLOGY | Facility: CLINIC | Age: 19
End: 2019-04-03

## 2019-06-18 ENCOUNTER — HOSPITAL ENCOUNTER (EMERGENCY)
Facility: HOSPITAL | Age: 19
Discharge: HOME | End: 2019-06-18
Attending: EMERGENCY MEDICINE
Payer: MEDICAID

## 2019-06-18 VITALS
RESPIRATION RATE: 18 BRPM | HEIGHT: 63 IN | SYSTOLIC BLOOD PRESSURE: 144 MMHG | WEIGHT: 240 LBS | DIASTOLIC BLOOD PRESSURE: 86 MMHG | BODY MASS INDEX: 42.52 KG/M2 | HEART RATE: 74 BPM | OXYGEN SATURATION: 99 % | TEMPERATURE: 98 F

## 2019-06-18 DIAGNOSIS — Z87.42 HISTORY OF ENDOMETRIOSIS: ICD-10-CM

## 2019-06-18 DIAGNOSIS — R10.32 LLQ PAIN: Primary | ICD-10-CM

## 2019-06-18 LAB
ALBUMIN SERPL BCP-MCNC: 3.2 G/DL (ref 3.5–5.2)
ALP SERPL-CCNC: 60 U/L (ref 55–135)
ALT SERPL W/O P-5'-P-CCNC: 29 U/L (ref 10–44)
ANION GAP SERPL CALC-SCNC: 10 MMOL/L (ref 8–16)
AST SERPL-CCNC: 41 U/L (ref 10–40)
B-HCG UR QL: NEGATIVE
BACTERIA GENITAL QL WET PREP: ABNORMAL
BASOPHILS # BLD AUTO: 0.01 K/UL (ref 0–0.2)
BASOPHILS NFR BLD: 0.1 % (ref 0–1.9)
BILIRUB SERPL-MCNC: 0.3 MG/DL (ref 0.1–1)
BILIRUB UR QL STRIP: NEGATIVE
BUN SERPL-MCNC: 9 MG/DL (ref 6–20)
C TRACH DNA SPEC QL NAA+PROBE: NOT DETECTED
CALCIUM SERPL-MCNC: 9.6 MG/DL (ref 8.7–10.5)
CHLORIDE SERPL-SCNC: 103 MMOL/L (ref 95–110)
CLARITY UR: CLEAR
CLUE CELLS VAG QL WET PREP: ABNORMAL
CO2 SERPL-SCNC: 24 MMOL/L (ref 23–29)
COLOR UR: YELLOW
CREAT SERPL-MCNC: 0.7 MG/DL (ref 0.5–1.4)
CTP QC/QA: YES
DIFFERENTIAL METHOD: ABNORMAL
EOSINOPHIL # BLD AUTO: 0.2 K/UL (ref 0–0.5)
EOSINOPHIL NFR BLD: 2 % (ref 0–8)
ERYTHROCYTE [DISTWIDTH] IN BLOOD BY AUTOMATED COUNT: 14.8 % (ref 11.5–14.5)
EST. GFR  (AFRICAN AMERICAN): >60 ML/MIN/1.73 M^2
EST. GFR  (NON AFRICAN AMERICAN): >60 ML/MIN/1.73 M^2
FILAMENT FUNGI VAG WET PREP-#/AREA: ABNORMAL
GLUCOSE SERPL-MCNC: 93 MG/DL (ref 70–110)
GLUCOSE UR QL STRIP: NEGATIVE
HCT VFR BLD AUTO: 41 % (ref 37–48.5)
HGB BLD-MCNC: 12.9 G/DL (ref 12–16)
HGB UR QL STRIP: ABNORMAL
KETONES UR QL STRIP: NEGATIVE
LEUKOCYTE ESTERASE UR QL STRIP: NEGATIVE
LIPASE SERPL-CCNC: 9 U/L (ref 4–60)
LYMPHOCYTES # BLD AUTO: 3 K/UL (ref 1–4.8)
LYMPHOCYTES NFR BLD: 37.5 % (ref 18–48)
MCH RBC QN AUTO: 26.9 PG (ref 27–31)
MCHC RBC AUTO-ENTMCNC: 31.5 G/DL (ref 32–36)
MCV RBC AUTO: 86 FL (ref 82–98)
MICROSCOPIC COMMENT: NORMAL
MONOCYTES # BLD AUTO: 0.5 K/UL (ref 0.3–1)
MONOCYTES NFR BLD: 6.1 % (ref 4–15)
N GONORRHOEA DNA SPEC QL NAA+PROBE: NOT DETECTED
NEUTROPHILS # BLD AUTO: 4.3 K/UL (ref 1.8–7.7)
NEUTROPHILS NFR BLD: 54.3 % (ref 38–73)
NITRITE UR QL STRIP: NEGATIVE
PH UR STRIP: 6 [PH] (ref 5–8)
PLATELET # BLD AUTO: 274 K/UL (ref 150–350)
PMV BLD AUTO: 11.9 FL (ref 9.2–12.9)
POTASSIUM SERPL-SCNC: 4.6 MMOL/L (ref 3.5–5.1)
PROT SERPL-MCNC: 7.6 G/DL (ref 6–8.4)
PROT UR QL STRIP: NEGATIVE
RBC # BLD AUTO: 4.79 M/UL (ref 4–5.4)
RBC #/AREA URNS HPF: 1 /HPF (ref 0–4)
SODIUM SERPL-SCNC: 137 MMOL/L (ref 136–145)
SP GR UR STRIP: 1.01 (ref 1–1.03)
SPECIMEN SOURCE: ABNORMAL
SQUAMOUS #/AREA URNS HPF: 1 /HPF
T VAGINALIS GENITAL QL WET PREP: ABNORMAL
URN SPEC COLLECT METH UR: ABNORMAL
UROBILINOGEN UR STRIP-ACNC: NEGATIVE EU/DL
WBC # BLD AUTO: 7.87 K/UL (ref 3.9–12.7)
WBC #/AREA VAG WET PREP: ABNORMAL
YEAST GENITAL QL WET PREP: ABNORMAL

## 2019-06-18 PROCEDURE — 85025 COMPLETE CBC W/AUTO DIFF WBC: CPT

## 2019-06-18 PROCEDURE — 96366 THER/PROPH/DIAG IV INF ADDON: CPT

## 2019-06-18 PROCEDURE — 87491 CHLMYD TRACH DNA AMP PROBE: CPT

## 2019-06-18 PROCEDURE — 81000 URINALYSIS NONAUTO W/SCOPE: CPT

## 2019-06-18 PROCEDURE — 96365 THER/PROPH/DIAG IV INF INIT: CPT

## 2019-06-18 PROCEDURE — 87210 SMEAR WET MOUNT SALINE/INK: CPT

## 2019-06-18 PROCEDURE — 81025 URINE PREGNANCY TEST: CPT | Performed by: PHYSICIAN ASSISTANT

## 2019-06-18 PROCEDURE — 83690 ASSAY OF LIPASE: CPT

## 2019-06-18 PROCEDURE — 25000003 PHARM REV CODE 250: Performed by: PHYSICIAN ASSISTANT

## 2019-06-18 PROCEDURE — 80053 COMPREHEN METABOLIC PANEL: CPT

## 2019-06-18 PROCEDURE — 63600175 PHARM REV CODE 636 W HCPCS: Performed by: PHYSICIAN ASSISTANT

## 2019-06-18 PROCEDURE — 99285 EMERGENCY DEPT VISIT HI MDM: CPT | Mod: 25

## 2019-06-18 RX ORDER — ACETAMINOPHEN 500 MG
500 TABLET ORAL EVERY 4 HOURS PRN
Qty: 20 TABLET | Refills: 0 | Status: SHIPPED | OUTPATIENT
Start: 2019-06-18 | End: 2019-06-23

## 2019-06-18 RX ORDER — PROMETHAZINE HYDROCHLORIDE 25 MG/1
12.5 TABLET ORAL EVERY 6 HOURS PRN
Qty: 20 TABLET | Refills: 0 | Status: SHIPPED | OUTPATIENT
Start: 2019-06-18 | End: 2019-06-23

## 2019-06-18 RX ORDER — ACETAMINOPHEN 500 MG
500 TABLET ORAL
Status: COMPLETED | OUTPATIENT
Start: 2019-06-18 | End: 2019-06-18

## 2019-06-18 RX ORDER — TRAMADOL HYDROCHLORIDE 50 MG/1
50 TABLET ORAL EVERY 6 HOURS PRN
Qty: 12 TABLET | Refills: 0 | Status: SHIPPED | OUTPATIENT
Start: 2019-06-18 | End: 2019-06-21

## 2019-06-18 RX ORDER — TRAMADOL HYDROCHLORIDE 50 MG/1
50 TABLET ORAL
Status: COMPLETED | OUTPATIENT
Start: 2019-06-18 | End: 2019-06-18

## 2019-06-18 RX ADMIN — TRAMADOL HYDROCHLORIDE 50 MG: 50 TABLET, FILM COATED ORAL at 02:06

## 2019-06-18 RX ADMIN — ACETAMINOPHEN 500 MG: 500 TABLET, FILM COATED ORAL at 02:06

## 2019-06-18 RX ADMIN — PROMETHAZINE HYDROCHLORIDE 6.25 MG: 25 INJECTION INTRAMUSCULAR; INTRAVENOUS at 02:06

## 2019-06-18 RX ADMIN — SODIUM CHLORIDE 1000 ML: 0.9 INJECTION, SOLUTION INTRAVENOUS at 02:06

## 2019-06-18 NOTE — ED TRIAGE NOTES
Patient came to the ED with complaint of dizziness, headache, lower left side pain and vomiting since yesterday.

## 2019-06-18 NOTE — ED PROVIDER NOTES
"Encounter Date: 6/18/2019       History     Chief Complaint   Patient presents with    Abdominal Pain     Patient presents to the ER with spouse via personal vehicle. Patient presents with abdominal pain (stabbing), nausea with vomiting x 5, headache, and severe dizziness (feels like she is spinning). Symptoms started yesterday morning     CC: Abdominal Pain    HPI:   20 y/o female with history of endometriosispresenting for evaluation of 7/10 cramping LLQ abdominal/pelvic pain. She states it "feels like it is going to pop, a lot of pressure" which she states feels similar to her history of pain she has 2/2 endometriosis and ovarian cysts. She has constant pain that is more severe during her menstrual cycles. Pt reports similar episodes every month during her menstrual cycle for which she has been seen by the emergency department every other month for past 4 years. Patient reports she has been seen by OBGYN who recommends oophorectomy.  She has an appointment scheduled for later this week with a new OBGYN.  She is currently on her cycle and has been for 4 days.  She has saturated 1-2 ultra super tampons per hour this week which she states is less than her usual bleeding.  She has associated nausea and vomiting x 5 which she states she typically gets. Pt additionally reports she has developed L sided 6/10 HA radiating down L side of neck and diplopia in L eye and lightheadedness that is constant since 0900 yesterday. She denies similar episodes. She does have history of neurogenic/cardiogenic syncope and has a monitor and is followed by cardiology. She denies any CP, SOB, dizziness or syncope and states that lightheadedness is not similar to cardiac issues she has had previously.  Attempted tx with Tylenol and phenergan but states unable to tolerate PO. Denies fever, urinary symptoms, hematemesis, vaginal discharge, concern for STI        Review of patient's allergies indicates:   Allergen Reactions    Advair " diskus [fluticasone propion-salmeterol] Anaphylaxis    Aspartame Anaphylaxis     Other reaction(s): UNKNOWN    Bupropion Anaphylaxis    Chloral hydrate analogues Other (See Comments)     Adverse reaction per grandma    Imitrex [sumatriptan succinate] Anaphylaxis    Ugo Anaphylaxis    Metoprolol Shortness Of Breath    Nitroglycerin Anaphylaxis     Other reaction(s): stop breathing    Quetiapine Hallucinations     Other reaction(s): HALLUCINATION    Reglan [metoclopramide hcl] Shortness Of Breath    Tomato (solanum lycopersicum) Anaphylaxis and Shortness Of Breath    Unable to assess Anaphylaxis     Crabs/not allergic to iodine    Zantac [ranitidine hcl] Shortness Of Breath    Hibiclens (isopropyl alcohol) Itching    Abilify [aripiprazole]     Ambien [zolpidem]     Banana      Other reaction(s): STOP BREATHING    Chloral hydrate      Other reaction(s): adverse affect    Compazine [prochlorperazine edisylate]     Crab      Other reaction(s): STOP BREATHING    Desmopressin     Eggplant      Other reaction(s): EGG PLANT    Honey      Other reaction(s): LIP SWELLING    Ketorolac      Other reaction(s): RASH    Kiwi (actinidia chinensis)     Lexapro [escitalopram oxalate]     Meclizine      hives    Metoclopramide      Other reaction(s): SHORTNESS OF BREATH    Ondansetron      Other reaction(s): RASH    Prochlorperazine      Other reaction(s): RASH    Remeron [mirtazapine]     Sucralose      Other reaction(s): STOPS BREATHING    Tomato      Other reaction(s): stop breathing    Xanax [alprazolam]     Zofran [ondansetron hcl (pf)]     Mayonnaise Rash     Other reaction(s): STOP BREATHING    Sulfa (sulfonamide antibiotics) Rash and Nausea And Vomiting     Past Medical History:   Diagnosis Date    Asthma     Brain tumor, pineal gland     Depression     Endometriosis     Hypertension     Precocious puberty     Seizures     Syncope, cardiogenic     Thyroid disease      Past Surgical  History:   Procedure Laterality Date     SECTION      COLONOSCOPY N/A 2014    [facility] ENDO (2ND FLR)    COLONOSCOPY W/ BIOPSIES      DELIVERY- SECTION N/A 2018    Performed by West Garcia MD at [facility] L&D OR    EGD (ESOPHAGOGASTRODUODENOSCOPY) N/A 2014    Per[facility] by [facility] ENDO (2ND FLR)    EGD (ESOPHAGOGASTRODUODENOSCOPY) N/A 10/11/2013    Performed by Hasmukh Caputo [facility] at [facility] ENDO (2ND FLR)    ESOPHAGOGASTRODUODENOSCOPY      implantable cardiac monitor placement  2018    TONSILLECTOMY      TYMPANOSTOMY TUBE PLACEMENT       Family History   Problem Relation Age of Onset    Heart disease Mother     Hyperlipidemia Mother     Asthma Mother     Crohn's disease Mother 37        pending surgery. prior med; sulfasalazine    Depression Mother     Bipolar disorder Mother     Arthritis Mother     Anxiety disorder Mother     Other Mother         PTSD    Hypertension Maternal Grandmother     Asthma Maternal Grandmother     Depression Maternal Grandmother     Anxiety disorder Maternal Grandmother     Hypertension Paternal Grandmother     Diabetes Paternal Grandmother     Nephrolithiasis Father     Nephrolithiasis Brother     ADD / ADHD Brother     Depression Brother     Bipolar disorder Brother     Crohn's disease Maternal Grandfather     Alcohol abuse Paternal Grandfather     Breast cancer Neg Hx     Colon cancer Neg Hx     Ovarian cancer Neg Hx      Social History     Tobacco Use    Smoking status: Passive Smoke Exposure - Never Smoker    Smokeless tobacco: Never Used   Substance Use Topics    Alcohol use: No    Drug use: No     Review of Systems   Constitutional: Positive for chills and fatigue. Negative for fever.   HENT: Negative for congestion, ear pain, rhinorrhea and sore throat.    Eyes: Negative for redness.   Respiratory: Negative for cough and shortness of breath.     Cardiovascular: Negative for chest pain.   Gastrointestinal: Positive for abdominal pain, nausea and vomiting.   Genitourinary: Positive for menstrual problem and vaginal bleeding. Negative for dysuria, frequency and urgency.   Musculoskeletal: Negative for back pain and neck pain.   Skin: Negative for rash.   Neurological: Positive for light-headedness and headaches. Negative for dizziness, seizures, speech difficulty, weakness and numbness.   Psychiatric/Behavioral: Negative for confusion.       Physical Exam     Initial Vitals [06/18/19 0011]   BP Pulse Resp Temp SpO2   (!) 144/96 88 18 98.1 °F (36.7 °C) 97 %      MAP       --         Physical Exam    Nursing note and vitals reviewed.  Constitutional: She appears well-developed and well-nourished.   HENT:   Head: Normocephalic.   Right Ear: External ear normal.   Left Ear: External ear normal.   Nose: Nose normal.   Mouth/Throat: Oropharynx is clear and moist.   Eyes: Conjunctivae are normal.   Cardiovascular: Normal rate and regular rhythm. Exam reveals no gallop and no friction rub.    No murmur heard.  Pulmonary/Chest: Breath sounds normal. She has no wheezes. She has no rhonchi. She has no rales.   Abdominal: Soft. Bowel sounds are normal. She exhibits no distension. There is tenderness in the suprapubic area and left lower quadrant. There is no rigidity, no rebound, no guarding, no CVA tenderness, no tenderness at McBurney's point and negative Wellington's sign.   Genitourinary:   Genitourinary Comments: Chaperoned by Belle Rodriguez LPN:   Scant amount of blood in vaginal vault. No CMT. L adnexal ttp with no palpable mass   Musculoskeletal: Normal range of motion.   Lymphadenopathy:     She has no cervical adenopathy.   Neurological: She is alert. She has normal strength. No cranial nerve deficit or sensory deficit.   Skin: Skin is warm and dry.   Psychiatric: She has a normal mood and affect.         ED Course   Procedures  Labs Reviewed   URINALYSIS, REFLEX  TO URINE CULTURE   CBC W/ AUTO DIFFERENTIAL   COMPREHENSIVE METABOLIC PANEL   LIPASE   URINALYSIS MICROSCOPIC   POCT URINE PREGNANCY          Imaging Results    None          Medical Decision Making:   Initial Assessment:   19-year-old female with history of endometriosis and ovarian cyst presenting for evaluation of left lower quadrant pain more severe since onset of her menstrual cycle.  Patient states this is identical to pain she gets every other month for which she has to present to the emergency department.  She sees an OB gyn.  They suggested left oophorectomy.  She has an appointment this week.  Patient is afebrile, well-appearing in no distress.  Not hypotensive or tachycardic.  Labs unremarkable. Vaginal screen is negative. UPT is negative and UA is negative for infection.  GC is pending.  Considered but doubt PID, TOA or ovarian torsion based on physical exam.  Considered but doubt diverticulitis or acute surgical abdomen.  Patient states this is identical to her usual pain with her cycles.  Patient given medications in the ED with full resolution of her pain and nausea.  Will have patient follow up with her OBGYN this week and return to the emergency department for worsening symptoms or as needed.                      Clinical Impression:       ICD-10-CM ICD-9-CM   1. LLQ pain R10.32 789.04   2. History of endometriosis Z87.42 V13.29                     Julia Klein PA-C  06/18/19 0556

## 2019-06-18 NOTE — DISCHARGE INSTRUCTIONS
Take medications as prescribed.  Follow-up with your OBGYN at your appointment this week.  Return to the emergency department for worsening symptoms or as needed.

## 2019-06-19 RX ORDER — METOPROLOL SUCCINATE 25 MG/1
25 TABLET, EXTENDED RELEASE ORAL DAILY
Qty: 90 TABLET | Refills: 3 | Status: SHIPPED | OUTPATIENT
Start: 2019-06-19 | End: 2019-11-22

## 2019-06-27 ENCOUNTER — CLINICAL SUPPORT (OUTPATIENT)
Dept: CARDIOLOGY | Facility: HOSPITAL | Age: 19
End: 2019-06-27
Attending: INTERNAL MEDICINE
Payer: MEDICAID

## 2019-06-27 DIAGNOSIS — Z95.818 STATUS POST PLACEMENT OF IMPLANTABLE LOOP RECORDER: ICD-10-CM

## 2019-06-27 PROCEDURE — 93299 CARDIAC DEVICE CHECK - REMOTE: CPT

## 2019-07-06 ENCOUNTER — HOSPITAL ENCOUNTER (EMERGENCY)
Facility: HOSPITAL | Age: 19
Discharge: HOME | End: 2019-07-07
Attending: EMERGENCY MEDICINE
Payer: MEDICAID

## 2019-07-06 DIAGNOSIS — N94.89 PELVIC CONGESTION SYNDROME: Primary | ICD-10-CM

## 2019-07-06 LAB
ALBUMIN SERPL BCP-MCNC: 3.2 G/DL (ref 3.5–5.2)
ALP SERPL-CCNC: 74 U/L (ref 55–135)
ALT SERPL W/O P-5'-P-CCNC: 55 U/L (ref 10–44)
ANION GAP SERPL CALC-SCNC: 12 MMOL/L (ref 8–16)
AST SERPL-CCNC: 43 U/L (ref 10–40)
B-HCG UR QL: NEGATIVE
BASOPHILS # BLD AUTO: 0.03 K/UL (ref 0–0.2)
BASOPHILS NFR BLD: 0.3 % (ref 0–1.9)
BILIRUB SERPL-MCNC: 0.3 MG/DL (ref 0.1–1)
BILIRUB UR QL STRIP: NEGATIVE
BUN SERPL-MCNC: 8 MG/DL (ref 6–20)
CALCIUM SERPL-MCNC: 9.9 MG/DL (ref 8.7–10.5)
CHLORIDE SERPL-SCNC: 102 MMOL/L (ref 95–110)
CLARITY UR REFRACT.AUTO: CLEAR
CO2 SERPL-SCNC: 26 MMOL/L (ref 23–29)
COLOR UR AUTO: YELLOW
CREAT SERPL-MCNC: 0.7 MG/DL (ref 0.5–1.4)
CTP QC/QA: YES
DIFFERENTIAL METHOD: ABNORMAL
EOSINOPHIL # BLD AUTO: 0.1 K/UL (ref 0–0.5)
EOSINOPHIL NFR BLD: 1 % (ref 0–8)
ERYTHROCYTE [DISTWIDTH] IN BLOOD BY AUTOMATED COUNT: 14 % (ref 11.5–14.5)
EST. GFR  (AFRICAN AMERICAN): >60 ML/MIN/1.73 M^2
EST. GFR  (NON AFRICAN AMERICAN): >60 ML/MIN/1.73 M^2
GLUCOSE SERPL-MCNC: 104 MG/DL (ref 70–110)
GLUCOSE UR QL STRIP: NEGATIVE
HCT VFR BLD AUTO: 38.8 % (ref 37–48.5)
HGB BLD-MCNC: 12.2 G/DL (ref 12–16)
HGB UR QL STRIP: NEGATIVE
IMM GRANULOCYTES # BLD AUTO: 0.04 K/UL (ref 0–0.04)
IMM GRANULOCYTES NFR BLD AUTO: 0.4 % (ref 0–0.5)
KETONES UR QL STRIP: NEGATIVE
LEUKOCYTE ESTERASE UR QL STRIP: NEGATIVE
LIPASE SERPL-CCNC: 13 U/L (ref 4–60)
LYMPHOCYTES # BLD AUTO: 3.3 K/UL (ref 1–4.8)
LYMPHOCYTES NFR BLD: 33.1 % (ref 18–48)
MCH RBC QN AUTO: 26.6 PG (ref 27–31)
MCHC RBC AUTO-ENTMCNC: 31.4 G/DL (ref 32–36)
MCV RBC AUTO: 85 FL (ref 82–98)
MICROSCOPIC COMMENT: ABNORMAL
MONOCYTES # BLD AUTO: 0.6 K/UL (ref 0.3–1)
MONOCYTES NFR BLD: 5.9 % (ref 4–15)
NEUTROPHILS # BLD AUTO: 5.8 K/UL (ref 1.8–7.7)
NEUTROPHILS NFR BLD: 59.3 % (ref 38–73)
NITRITE UR QL STRIP: NEGATIVE
NRBC BLD-RTO: 0 /100 WBC
PH UR STRIP: 6 [PH] (ref 5–8)
PLATELET # BLD AUTO: 268 K/UL (ref 150–350)
PMV BLD AUTO: 12.3 FL (ref 9.2–12.9)
POTASSIUM SERPL-SCNC: 3.8 MMOL/L (ref 3.5–5.1)
PROT SERPL-MCNC: 7 G/DL (ref 6–8.4)
PROT UR QL STRIP: NEGATIVE
RBC # BLD AUTO: 4.59 M/UL (ref 4–5.4)
RBC #/AREA URNS AUTO: 5 /HPF (ref 0–4)
SODIUM SERPL-SCNC: 140 MMOL/L (ref 136–145)
SP GR UR STRIP: 1.02 (ref 1–1.03)
SQUAMOUS #/AREA URNS AUTO: 3 /HPF
URN SPEC COLLECT METH UR: NORMAL
WBC # BLD AUTO: 9.81 K/UL (ref 3.9–12.7)
WBC #/AREA URNS AUTO: 1 /HPF (ref 0–5)

## 2019-07-06 PROCEDURE — 85025 COMPLETE CBC W/AUTO DIFF WBC: CPT

## 2019-07-06 PROCEDURE — 25000003 PHARM REV CODE 250: Performed by: EMERGENCY MEDICINE

## 2019-07-06 PROCEDURE — 96376 TX/PRO/DX INJ SAME DRUG ADON: CPT

## 2019-07-06 PROCEDURE — 80053 COMPREHEN METABOLIC PANEL: CPT

## 2019-07-06 PROCEDURE — 99284 EMERGENCY DEPT VISIT MOD MDM: CPT | Mod: ,,, | Performed by: EMERGENCY MEDICINE

## 2019-07-06 PROCEDURE — 83690 ASSAY OF LIPASE: CPT

## 2019-07-06 PROCEDURE — 96365 THER/PROPH/DIAG IV INF INIT: CPT

## 2019-07-06 PROCEDURE — 63600175 PHARM REV CODE 636 W HCPCS: Performed by: EMERGENCY MEDICINE

## 2019-07-06 PROCEDURE — 99284 PR EMERGENCY DEPT VISIT,LEVEL IV: ICD-10-PCS | Mod: ,,, | Performed by: EMERGENCY MEDICINE

## 2019-07-06 PROCEDURE — 99284 EMERGENCY DEPT VISIT MOD MDM: CPT | Mod: 25

## 2019-07-06 PROCEDURE — 81025 URINE PREGNANCY TEST: CPT | Performed by: EMERGENCY MEDICINE

## 2019-07-06 PROCEDURE — 81001 URINALYSIS AUTO W/SCOPE: CPT

## 2019-07-06 RX ORDER — TRAMADOL HYDROCHLORIDE 50 MG/1
50 TABLET ORAL
Status: COMPLETED | OUTPATIENT
Start: 2019-07-06 | End: 2019-07-06

## 2019-07-06 RX ADMIN — TRAMADOL HYDROCHLORIDE 50 MG: 50 TABLET, FILM COATED ORAL at 11:07

## 2019-07-06 RX ADMIN — PROMETHAZINE HYDROCHLORIDE 12.5 MG: 25 INJECTION INTRAMUSCULAR; INTRAVENOUS at 11:07

## 2019-07-06 RX ADMIN — SODIUM CHLORIDE 1000 ML: 0.9 INJECTION, SOLUTION INTRAVENOUS at 11:07

## 2019-07-07 VITALS
TEMPERATURE: 97 F | HEART RATE: 86 BPM | RESPIRATION RATE: 18 BRPM | DIASTOLIC BLOOD PRESSURE: 80 MMHG | HEIGHT: 62 IN | OXYGEN SATURATION: 99 % | BODY MASS INDEX: 45.08 KG/M2 | WEIGHT: 245 LBS | SYSTOLIC BLOOD PRESSURE: 136 MMHG

## 2019-07-07 PROCEDURE — 25000003 PHARM REV CODE 250: Performed by: EMERGENCY MEDICINE

## 2019-07-07 PROCEDURE — 63600175 PHARM REV CODE 636 W HCPCS: Performed by: EMERGENCY MEDICINE

## 2019-07-07 RX ORDER — TRAMADOL HYDROCHLORIDE 50 MG/1
50 TABLET ORAL
Status: COMPLETED | OUTPATIENT
Start: 2019-07-07 | End: 2019-07-07

## 2019-07-07 RX ORDER — TRAMADOL HYDROCHLORIDE 50 MG/1
50 TABLET ORAL
Status: DISCONTINUED | OUTPATIENT
Start: 2019-07-07 | End: 2019-07-07 | Stop reason: HOSPADM

## 2019-07-07 RX ADMIN — TRAMADOL HYDROCHLORIDE 50 MG: 50 TABLET, FILM COATED ORAL at 12:07

## 2019-07-07 RX ADMIN — PROMETHAZINE HYDROCHLORIDE 12.5 MG: 25 INJECTION INTRAMUSCULAR; INTRAVENOUS at 12:07

## 2019-07-07 RX ADMIN — TRAMADOL HYDROCHLORIDE 50 MG: 50 TABLET, FILM COATED ORAL at 02:07

## 2019-07-07 NOTE — ED PROVIDER NOTES
Encounter Date: 7/6/2019       History     Chief Complaint   Patient presents with    Abdominal Pain     C/o lower left and middle abd pain x 45 minutes. Endorses nausea      19-year-old female with a long history of endometriosis presents with acute onset of left adnexal pain. This is the worst pain she has ever had.  Associated nausea.  She has a history of cysts on both ovaries.  She denies vaginal bleeding or discharge. She is on tramadol but did not take any tonight.  She has had several laparoscopies and hysteroscopy is for this problem.  Patient denies vomiting, fever, cough, shortness of breath, chest pain, or dysuria.  The patients available PMH, PSH, Social History, medications, allergies, and triage vital signs were reviewed just prior to their medical evaluation.        Review of patient's allergies indicates:   Allergen Reactions    Advair diskus [fluticasone propion-salmeterol] Anaphylaxis    Aspartame Anaphylaxis     Other reaction(s): UNKNOWN    Bupropion Anaphylaxis    Chloral hydrate analogues Other (See Comments)     Adverse reaction per grandma    Imitrex [sumatriptan succinate] Anaphylaxis    [place] Anaphylaxis    Metoprolol Shortness Of Breath    Nitroglycerin Anaphylaxis     Other reaction(s): stop breathing    Quetiapine Hallucinations     Other reaction(s): HALLUCINATION    Reglan [metoclopramide hcl] Shortness Of Breath    Tomato (solanum lycopersicum) Anaphylaxis and Shortness Of Breath    Unable to assess Anaphylaxis     Crabs/not allergic to iodine    Zantac [ranitidine hcl] Shortness Of Breath    Hibiclens (isopropyl alcohol) Itching    Abilify [aripiprazole]     Ambien [zolpidem]     Banana      Other reaction(s): STOP BREATHING    Chloral hydrate      Other reaction(s): adverse affect    Compazine [prochlorperazine edisylate]     Crab      Other reaction(s): STOP BREATHING    Desmopressin     Eggplant      Other reaction(s): EGG PLANT    Honey      Other  reaction(s): LIP SWELLING    Ketorolac      Other reaction(s): RASH    Kiwi (actinidia chinensis)     Lexapro [escitalopram oxalate]     Meclizine      hives    Metoclopramide      Other reaction(s): SHORTNESS OF BREATH    Ondansetron      Other reaction(s): RASH    Prochlorperazine      Other reaction(s): RASH    Remeron [mirtazapine]     Sucralose      Other reaction(s): STOPS BREATHING    Tomato      Other reaction(s): stop breathing    Xanax [alprazolam]     Zofran [ondansetron hcl (pf)]     Mayonnaise Rash     Other reaction(s): STOP BREATHING    Sulfa (sulfonamide antibiotics) Rash and Nausea And Vomiting     Past Medical History:   Diagnosis Date    Asthma     Brain tumor, pineal gland     Depression     Endometriosis     Hypertension     Precocious puberty     Seizures     Syncope, cardiogenic     Thyroid disease      Past Surgical History:   Procedure Laterality Date     SECTION      COLONOSCOPY N/A 2014    Performed by [facility]illo [facility] at [facility] (2ND FLR)    COLONOSCOPY W/ BIOPSIES      DELIVERY- SECTION N/A 2018    Performed by West [facility] at [facility]    EGD (ESOPHAGOGASTRODUODENOSCOPY) N/A 2014    Performed by [facility]illo [facility] at [facility] ENDO (2ND FLR)    EGD (ESOPHAGOGASTRODUODENOSCOPY) N/A 10/11/2013    Performed by Hasmukh Caputo MD at [facility] ENDO (2ND FLR)    ESOPHAGOGASTRODUODENOSCOPY      implantable cardiac monitor placement  2018    TONSILLECTOMY      TYMPANOSTOMY TUBE PLACEMENT       Family History   Problem Relation Age of Onset    Heart disease Mother     Hyperlipidemia Mother     Asthma Mother     Crohn's disease Mother 37        pending surgery. prior med; sulfasalazine    Depression Mother     Bipolar disorder Mother     Arthritis Mother     Anxiety disorder Mother     Other Mother         PTSD    Hypertension Maternal Grandmother     Asthma Maternal Grandmother     Depression Maternal  Grandmother     Anxiety disorder Maternal Grandmother     Hypertension Paternal Grandmother     Diabetes Paternal Grandmother     Nephrolithiasis Father     Nephrolithiasis Brother     ADD / ADHD Brother     Depression Brother     Bipolar disorder Brother     Crohn's disease Maternal Grandfather     Alcohol abuse Paternal Grandfather     Breast cancer Neg Hx     Colon cancer Neg Hx     Ovarian cancer Neg Hx      Social History     Tobacco Use    Smoking status: Passive Smoke Exposure - Never Smoker    Smokeless tobacco: Never Used   Substance Use Topics    Alcohol use: No    Drug use: No     Review of Systems   Constitutional: Negative for fever.   HENT: Negative for sore throat.    Eyes: Negative for visual disturbance.   Respiratory: Negative for cough and shortness of breath.    Cardiovascular: Negative for chest pain.   Gastrointestinal: Positive for abdominal pain. Negative for nausea and vomiting.   Genitourinary: Positive for pelvic pain. Negative for dysuria, vaginal bleeding, vaginal discharge and vaginal pain.   Musculoskeletal: Negative for neck pain.   Skin: Negative for rash and wound.   Allergic/Immunologic: Negative for immunocompromised state.   Psychiatric/Behavioral: Negative for confusion.   All other systems reviewed and are negative.      Physical Exam     Initial Vitals [07/06/19 2245]   BP Pulse Resp Temp SpO2   133/75 (!) 116 18 98 °F (36.7 °C) --      MAP       --         Physical Exam    Nursing note and vitals reviewed.  Constitutional: She appears well-developed and well-nourished. She is not diaphoretic. No distress.   HENT:   Head: Normocephalic and atraumatic.   Nose: Nose normal.   Eyes: Conjunctivae are normal. Right eye exhibits no discharge. Left eye exhibits no discharge.   Neck: Normal range of motion. Neck supple.   Cardiovascular: Normal rate, regular rhythm and normal heart sounds. Exam reveals no gallop and no friction rub.    No murmur  heard.  Pulmonary/Chest: Breath sounds normal. No respiratory distress. She has no wheezes. She has no rhonchi. She has no rales.   Abdominal: Soft. She exhibits no distension. There is tenderness. There is no rebound and no guarding.   Llq/adnexa ttp   Genitourinary:   Genitourinary Comments: Patient declined pelvic at this time   Musculoskeletal: Normal range of motion. She exhibits no edema or tenderness.   Neurological: She is alert and oriented to person, place, and time. GCS score is 15. GCS eye subscore is 4. GCS verbal subscore is 5. GCS motor subscore is 6.   Skin: Skin is warm and dry. No rash noted. No erythema.   Psychiatric: She has a normal mood and affect. Her behavior is normal. Judgment and thought content normal.         ED Course   Procedures  Labs Reviewed   CBC W/ AUTO DIFFERENTIAL - Abnormal; Notable for the following components:       Result Value    Mean Corpuscular Hemoglobin 26.6 (*)     Mean Corpuscular Hemoglobin Conc 31.4 (*)     All other components within normal limits   COMPREHENSIVE METABOLIC PANEL - Abnormal; Notable for the following components:    Albumin 3.2 (*)     AST 43 (*)     ALT 55 (*)     All other components within normal limits   URINALYSIS MICROSCOPIC - Abnormal; Notable for the following components:    RBC, UA 5 (*)     All other components within normal limits    Narrative:     Preferred Collection Type->Urine, Clean Catch  NO GRAY TOP   URINALYSIS, REFLEX TO URINE CULTURE    Narrative:     Preferred Collection Type->Urine, Clean Catch  NO GRAY TOP   LIPASE   POCT URINE PREGNANCY          Imaging Results          US Pelvis Comp with Transvag NON-OB (xpd) (In process)    Procedure changed from US Pelvis Complete Non OB               X-Rays:   Independently Interpreted Readings:   Other Readings:  Reviewed US images    Medical Decision Making:   History:   I obtained history from: someone other than patient.  Old Medical Records: I decided to obtain old medical  records.  Clinical Tests:   Lab Tests: Ordered and Reviewed  Radiological Study: Ordered and Reviewed  ED Management:  19-year-old female with a long history of endometriosis presents with acute onset of left adnexal pain.  Vitals with tachycardia.  Physical exam as above.  Labs unremarkable.  US pending.  Pain and nausea controlled with tramadol and phenergan.  Turned over to Dr. Layton.  If no torsion may DC with close outpatient gyn f/up.  Patient will return to ED for worsening symptoms, inability to eat/drink, fever greater than 100.4, or any other concerns.  Did bedside teaching with return precautions.  All questions answered.  The patient acknowledges understanding.  Gave verbal discharge instructions.  Other:   I have discussed this case with another health care provider.                      Clinical Impression:   Left adnexal pain  History of endometriosis      Disposition:   Disposition: Other  Condition: Stable    Level of Complexity:  High, level 5.                    Ghulam Baca MD  07/07/19 0131

## 2019-07-07 NOTE — DISCHARGE INSTRUCTIONS
You were seen in the emergency department for left adnexal pain.  Ultrasound showed left-sided pelvic congestion which may be contributing to your pain. Please follow-up with your gynecologist early this week.  Return to the emergency department for worsening pain, fever or chills, nausea or vomiting, or other concerning symptoms.

## 2019-07-10 ENCOUNTER — CLINICAL SUPPORT (OUTPATIENT)
Dept: CARDIOLOGY | Facility: HOSPITAL | Age: 19
End: 2019-07-10
Attending: INTERNAL MEDICINE
Payer: MEDICAID

## 2019-07-10 DIAGNOSIS — Z95.818 STATUS POST PLACEMENT OF IMPLANTABLE LOOP RECORDER: ICD-10-CM

## 2019-07-10 PROCEDURE — 93299 CARDIAC DEVICE CHECK - REMOTE: CPT

## 2019-07-21 ENCOUNTER — HOSPITAL ENCOUNTER (EMERGENCY)
Facility: HOSPITAL | Age: 19
Discharge: HOME | End: 2019-07-22
Attending: EMERGENCY MEDICINE
Payer: MEDICAID

## 2019-07-21 DIAGNOSIS — N76.0 BACTERIAL VAGINOSIS: ICD-10-CM

## 2019-07-21 DIAGNOSIS — N93.8 DYSFUNCTIONAL UTERINE BLEEDING: Primary | ICD-10-CM

## 2019-07-21 DIAGNOSIS — B96.89 BACTERIAL VAGINOSIS: ICD-10-CM

## 2019-07-21 LAB
ABO + RH BLD: NORMAL
ANION GAP SERPL CALC-SCNC: 13 MMOL/L (ref 8–16)
B-HCG UR QL: NEGATIVE
BACTERIA GENITAL QL WET PREP: ABNORMAL
BASOPHILS # BLD AUTO: 0.03 K/UL (ref 0–0.2)
BASOPHILS NFR BLD: 0.3 % (ref 0–1.9)
BLD GP AB SCN CELLS X3 SERPL QL: NORMAL
BUN SERPL-MCNC: 8 MG/DL (ref 6–20)
CALCIUM SERPL-MCNC: 9.9 MG/DL (ref 8.7–10.5)
CHLORIDE SERPL-SCNC: 105 MMOL/L (ref 95–110)
CLUE CELLS VAG QL WET PREP: ABNORMAL
CO2 SERPL-SCNC: 23 MMOL/L (ref 23–29)
CREAT SERPL-MCNC: 0.6 MG/DL (ref 0.5–1.4)
CTP QC/QA: YES
DIFFERENTIAL METHOD: ABNORMAL
EOSINOPHIL # BLD AUTO: 0.1 K/UL (ref 0–0.5)
EOSINOPHIL NFR BLD: 1.5 % (ref 0–8)
ERYTHROCYTE [DISTWIDTH] IN BLOOD BY AUTOMATED COUNT: 13.9 % (ref 11.5–14.5)
EST. GFR  (AFRICAN AMERICAN): >60 ML/MIN/1.73 M^2
EST. GFR  (NON AFRICAN AMERICAN): >60 ML/MIN/1.73 M^2
FILAMENT FUNGI VAG WET PREP-#/AREA: ABNORMAL
GLUCOSE SERPL-MCNC: 86 MG/DL (ref 70–110)
HCT VFR BLD AUTO: 39.8 % (ref 37–48.5)
HGB BLD-MCNC: 12.6 G/DL (ref 12–16)
IMM GRANULOCYTES # BLD AUTO: 0.03 K/UL (ref 0–0.04)
IMM GRANULOCYTES NFR BLD AUTO: 0.3 % (ref 0–0.5)
INR PPP: 0.9 (ref 0.8–1.2)
LYMPHOCYTES # BLD AUTO: 3.2 K/UL (ref 1–4.8)
LYMPHOCYTES NFR BLD: 34.1 % (ref 18–48)
MCH RBC QN AUTO: 26.9 PG (ref 27–31)
MCHC RBC AUTO-ENTMCNC: 31.7 G/DL (ref 32–36)
MCV RBC AUTO: 85 FL (ref 82–98)
MONOCYTES # BLD AUTO: 0.6 K/UL (ref 0.3–1)
MONOCYTES NFR BLD: 6.4 % (ref 4–15)
NEUTROPHILS # BLD AUTO: 5.4 K/UL (ref 1.8–7.7)
NEUTROPHILS NFR BLD: 57.4 % (ref 38–73)
NRBC BLD-RTO: 0 /100 WBC
PLATELET # BLD AUTO: 279 K/UL (ref 150–350)
PMV BLD AUTO: 12.2 FL (ref 9.2–12.9)
POTASSIUM SERPL-SCNC: 3.8 MMOL/L (ref 3.5–5.1)
PROTHROMBIN TIME: 9.7 SEC (ref 9–12.5)
RBC # BLD AUTO: 4.69 M/UL (ref 4–5.4)
SODIUM SERPL-SCNC: 141 MMOL/L (ref 136–145)
SPECIMEN SOURCE: ABNORMAL
T VAGINALIS GENITAL QL WET PREP: ABNORMAL
WBC # BLD AUTO: 9.43 K/UL (ref 3.9–12.7)
WBC #/AREA VAG WET PREP: ABNORMAL
YEAST GENITAL QL WET PREP: ABNORMAL

## 2019-07-21 PROCEDURE — 87491 CHLMYD TRACH DNA AMP PROBE: CPT

## 2019-07-21 PROCEDURE — 80048 BASIC METABOLIC PNL TOTAL CA: CPT

## 2019-07-21 PROCEDURE — 86901 BLOOD TYPING SEROLOGIC RH(D): CPT

## 2019-07-21 PROCEDURE — 85610 PROTHROMBIN TIME: CPT

## 2019-07-21 PROCEDURE — 99284 EMERGENCY DEPT VISIT MOD MDM: CPT | Mod: ,,, | Performed by: PHYSICIAN ASSISTANT

## 2019-07-21 PROCEDURE — 81025 URINE PREGNANCY TEST: CPT | Performed by: PHYSICIAN ASSISTANT

## 2019-07-21 PROCEDURE — 25000003 PHARM REV CODE 250: Performed by: PHYSICIAN ASSISTANT

## 2019-07-21 PROCEDURE — 85025 COMPLETE CBC W/AUTO DIFF WBC: CPT

## 2019-07-21 PROCEDURE — 25000003 PHARM REV CODE 250: Performed by: EMERGENCY MEDICINE

## 2019-07-21 PROCEDURE — 96360 HYDRATION IV INFUSION INIT: CPT

## 2019-07-21 PROCEDURE — 99284 PR EMERGENCY DEPT VISIT,LEVEL IV: ICD-10-PCS | Mod: ,,, | Performed by: PHYSICIAN ASSISTANT

## 2019-07-21 PROCEDURE — 87210 SMEAR WET MOUNT SALINE/INK: CPT

## 2019-07-21 PROCEDURE — 99284 EMERGENCY DEPT VISIT MOD MDM: CPT | Mod: 25

## 2019-07-21 RX ORDER — ACETAMINOPHEN 500 MG
1000 TABLET ORAL
Status: COMPLETED | OUTPATIENT
Start: 2019-07-21 | End: 2019-07-21

## 2019-07-21 RX ORDER — HYDROCODONE BITARTRATE AND ACETAMINOPHEN 5; 325 MG/1; MG/1
1 TABLET ORAL
Status: COMPLETED | OUTPATIENT
Start: 2019-07-21 | End: 2019-07-21

## 2019-07-21 RX ORDER — PROMETHAZINE HYDROCHLORIDE 25 MG/1
25 TABLET ORAL
Status: COMPLETED | OUTPATIENT
Start: 2019-07-21 | End: 2019-07-21

## 2019-07-21 RX ADMIN — PROMETHAZINE HYDROCHLORIDE 25 MG: 25 TABLET ORAL at 11:07

## 2019-07-21 RX ADMIN — ACETAMINOPHEN 1000 MG: 500 TABLET ORAL at 09:07

## 2019-07-21 RX ADMIN — SODIUM CHLORIDE 1000 ML: 0.9 INJECTION, SOLUTION INTRAVENOUS at 10:07

## 2019-07-21 RX ADMIN — HYDROCODONE BITARTRATE AND ACETAMINOPHEN 1 TABLET: 5; 325 TABLET ORAL at 11:07

## 2019-07-22 VITALS
HEART RATE: 100 BPM | DIASTOLIC BLOOD PRESSURE: 80 MMHG | HEIGHT: 62 IN | WEIGHT: 240 LBS | TEMPERATURE: 98 F | RESPIRATION RATE: 15 BRPM | BODY MASS INDEX: 44.16 KG/M2 | OXYGEN SATURATION: 99 % | SYSTOLIC BLOOD PRESSURE: 136 MMHG

## 2019-07-22 LAB
C TRACH DNA SPEC QL NAA+PROBE: NOT DETECTED
N GONORRHOEA DNA SPEC QL NAA+PROBE: NOT DETECTED

## 2019-07-22 RX ORDER — METRONIDAZOLE 500 MG/1
500 TABLET ORAL EVERY 12 HOURS
Qty: 14 TABLET | Refills: 0 | Status: SHIPPED | OUTPATIENT
Start: 2019-07-22 | End: 2019-07-29

## 2019-07-22 NOTE — ED TRIAGE NOTES
19 year female c/o of heavy menstrual that's unlike the norm bleeding through pants and going through 32 tampons x 2 days. Also c/o n/v/d x 2 days also. Hx of endometriosis. Denies dizziness.

## 2019-07-22 NOTE — ED PROVIDER NOTES
Encounter Date: 7/21/2019    SCRIBE #1 NOTE: I, Jody Shah, am scribing for, and in the presence of,  Dr. Pires. I have scribed the following portions of the note - the APC attestation.       History     Chief Complaint   Patient presents with    Female  Problem     presents to ED c/o pelvic pain that is worse on L side and radiates down legs. also c/o heavy vaginal bleeding. states she's soaked 9-10 tampons in last 3 hours. Hx endometriosis. States this is the worst her symptoms have ever been.      19-year-old white female with history of endometriosis and pelvic congestion syndrome presents to the ED complaining of vaginal bleeding for the past 2 days.  She states her bleeding has gotten worse over the past 24 hrs and since 11:00  this morning she has been soaking 3 super plus tampons every 30 min to 1 hr.  She reports the passage of clots.  She endorses associated nausea, vomiting, lightheadedness, headache, and lower pelvic pain. She states that she normally has pelvic pain with her cycles but this is worse than normal. She was on birth control patches but had to discontinue this 2 months ago because she lost insurance and has been unable to pay for them out of pocket.  She was previously followed by [facility] but has not been able to see them recently due to insurance problems.  She denies any history of blood transfusions is not on any blood thinners.  She denies any abdominal trauma.  She denies fever, chest pain, dysuria.    The history is provided by the patient.     Review of patient's allergies indicates:   Allergen Reactions    Advair diskus [fluticasone propion-salmeterol] Anaphylaxis    Aspartame Anaphylaxis     Other reaction(s): UNKNOWN    Bupropion Anaphylaxis    Chloral hydrate analogues Other (See Comments)     Adverse reaction per grandma    Imitrex [sumatriptan succinate] Anaphylaxis    [place] Anaphylaxis    Metoprolol Shortness Of Breath    Nitroglycerin Anaphylaxis     Other  reaction(s): stop breathing    Quetiapine Hallucinations     Other reaction(s): HALLUCINATION    Reglan [metoclopramide hcl] Shortness Of Breath    Tomato (solanum lycopersicum) Anaphylaxis and Shortness Of Breath    Unable to assess Anaphylaxis     Crabs/not allergic to iodine    Zantac [ranitidine hcl] Shortness Of Breath    Hibiclens (isopropyl alcohol) Itching    Abilify [aripiprazole]     Ambien [zolpidem]     Banana      Other reaction(s): STOP BREATHING    Chloral hydrate      Other reaction(s): adverse affect    Compazine [prochlorperazine edisylate]     Crab      Other reaction(s): STOP BREATHING    Desmopressin     Eggplant      Other reaction(s): EGG PLANT    Honey      Other reaction(s): LIP SWELLING    Ketorolac      Other reaction(s): RASH    Kiwi (actinidia chinensis)     Lexapro [escitalopram oxalate]     Meclizine      hives    Metoclopramide      Other reaction(s): SHORTNESS OF BREATH    Ondansetron      Other reaction(s): RASH    Prochlorperazine      Other reaction(s): RASH    Remeron [mirtazapine]     Sucralose      Other reaction(s): STOPS BREATHING    Tomato      Other reaction(s): stop breathing    Xanax [alprazolam]     Zofran [ondansetron hcl (pf)]     Mayonnaise Rash     Other reaction(s): STOP BREATHING    Sulfa (sulfonamide antibiotics) Rash and Nausea And Vomiting     Past Medical History:   Diagnosis Date    Asthma     Brain tumor, pineal gland     Depression     Endometriosis     Hypertension     Precocious puberty     Seizures     Syncope, cardiogenic     Thyroid disease      Past Surgical History:   Procedure Laterality Date     SECTION      COLONOSCOPY N/A 2014    [facility]formed [facility] END[facility] (2ND FLR)    COLONOSCOPY W/ BIOPSIES      DELIVERY- SECTION N/A 2018    Performed by West Garcia MD at [facility] L&D OR    EGD (ESOPHAGOGASTRODUODENOSCOPY) N/A 2014    Performed by Savannah  MD Ruth [facility] ENDO (2ND FLR)    EGD (ESOPHAGOGASTRODUODENOSCOPY) N/A 10/11/2013    Performed by [facility]mukh Caputo MD at [facility] ENDO (2ND FLR)    ESOPHAGOGASTRODUODENOSCOPY      implantable cardiac monitor placement  08/02/2018    TONSILLECTOMY      TYMPANOSTOMY TUBE PLACEMENT       Family History   Problem Relation Age of Onset    Heart disease Mother     Hyperlipidemia Mother     Asthma Mother     Crohn's disease Mother 37        pending surgery. prior med; sulfasalazine    Depression Mother     Bipolar disorder Mother     Arthritis Mother     Anxiety disorder Mother     Other Mother         PTSD    Hypertension Maternal Grandmother     Asthma Maternal Grandmother     Depression Maternal Grandmother     Anxiety disorder Maternal Grandmother     Hypertension Paternal Grandmother     Diabetes Paternal Grandmother     Nephrolithiasis Father     Nephrolithiasis Brother     ADD / ADHD Brother     Depression Brother     Bipolar disorder Brother     Crohn's disease Maternal Grandfather     Alcohol abuse Paternal Grandfather     Breast cancer Neg Hx     Colon cancer Neg Hx     Ovarian cancer Neg Hx      Social History     Tobacco Use    Smoking status: Passive Smoke Exposure - Never Smoker    Smokeless tobacco: Never Used   Substance Use Topics    Alcohol use: No    Drug use: No     Review of Systems   Constitutional: Positive for chills. Negative for diaphoresis and fever.   HENT: Negative for congestion, rhinorrhea and sore throat.    Eyes: Negative for photophobia and visual disturbance.   Respiratory: Negative for cough and shortness of breath.    Cardiovascular: Negative for chest pain.   Gastrointestinal: Positive for abdominal pain, diarrhea, nausea and vomiting. Negative for constipation.   Genitourinary: Positive for pelvic pain and vaginal bleeding. Negative for dysuria and hematuria.   Musculoskeletal: Negative for back pain, neck pain and neck stiffness.   Skin:  Negative for rash and wound.   Neurological: Positive for light-headedness and headaches. Negative for syncope and numbness.   Psychiatric/Behavioral: Negative for confusion.       Physical Exam     Initial Vitals [07/21/19 2036]   BP Pulse Resp Temp SpO2   134/85 (!) 118 18 98 °F (36.7 °C) 98 %      MAP       --         Physical Exam    Nursing note and vitals reviewed.  Constitutional: She appears well-developed and well-nourished. She is not diaphoretic. No distress.   HENT:   Head: Normocephalic and atraumatic.   Neck: Normal range of motion. Neck supple.   Cardiovascular: Regular rhythm and normal heart sounds. Tachycardia present.  Exam reveals no gallop and no friction rub.    No murmur heard.  Pulmonary/Chest: Breath sounds normal. She has no wheezes. She has no rhonchi. She has no rales.   Abdominal: Soft. Bowel sounds are normal. There is tenderness (lower abdomen). There is no rebound and no guarding.   Genitourinary: There is no rash or tenderness on the right labia. There is no rash or tenderness on the left labia. Cervix exhibits no motion tenderness. There is bleeding (continuous bleeding noted from the cervical os, no clots) in the vagina.   Musculoskeletal: Normal range of motion.   Neurological: She is alert and oriented to person, place, and time.   Skin: Skin is warm and dry. No rash noted. No erythema.   Psychiatric: She has a normal mood and affect.         ED Course   Procedures  Labs Reviewed   VAGINAL SCREEN - Abnormal; Notable for the following components:       Result Value    Clue Cells Rare (*)     WBC - Vaginal Screen Rare (*)     Bacteria - Vaginal Screen Rare (*)     All other components within normal limits   CBC W/ AUTO DIFFERENTIAL - Abnormal; Notable for the following components:    Mean Corpuscular Hemoglobin 26.9 (*)     Mean Corpuscular Hemoglobin Conc 31.7 (*)     All other components within normal limits   C. TRACHOMATIS/N. GONORRHOEAE BY AMP DNA   PROTIME-INR   BASIC  METABOLIC PANEL   POCT URINE PREGNANCY   TYPE & SCREEN          Imaging Results    None          Medical Decision Making:   History:   Old Medical Records: I decided to obtain old medical records.  Old Records Summarized: records from previous admission(s).       <> Summary of Records: Seen in this ED 7/7 with similar complaint.  Pelvic ultrasound with no acute abnormalities.  Clinical Tests:   Lab Tests: Reviewed and Ordered       APC / Resident Notes:   19-year-old white female with history of endometriosis and pelvic congestion syndrome presents to the ED complaining of vaginal bleeding for the past 2 days.  Tachycardic.  Lungs are clear.  Abdomen is soft and tender in the lower abdomen.  Pelvic exam reveals vaginal bleeding with continuous bleeding from the cervical os.  No clots are appreciated. No cervical motion tenderness.  Patient had a pelvic ultrasound within the past month with no acute abnormalities.  I do not feel that ultrasound is not needed at this time.  Differential diagnosis includes but is not limited to anemia, dehydration, acute kidney injury, of vaginal infection, STI.  Will obtain labs, give IV fluids and reassess.    There is no leukocytosis or anemia appreciated. UPT negative. Clue cells noted on vaginal screen.    She was given IV fluids and pain control in the ED.  She reports improvement of her symptoms. I do not feel that she needs any further labs or imaging at this time.  Stable for discharge.    She was discharged with a prescription for flagyl (first dose given in the ED).  She will follow up with her PCP and OBGYN (she was provided with a community resource list).  All of the patient's questions were answered.  I reviewed the patient's chart and labs and discussed the case with my supervising physician.          Scribe Attestation:   Scribe #1: I performed the above scribed service and the documentation accurately describes the services I performed. I attest to the accuracy of the  note.    Attending Attestation:     Physician Attestation Statement for NP/PA:   I discussed this assessment and plan of this patient with the NP/PA, but I did not personally examine the patient. The face to face encounter was performed by the NP/PA.                     Clinical Impression:       ICD-10-CM ICD-9-CM   1. Dysfunctional uterine bleeding N93.8 626.8   2. Bacterial vaginosis N76.0 616.10    B96.89 041.9         Disposition:   Disposition: Discharged  Condition: Stable                        Daylin Simon PA-C  07/22/19 0040       Carlos Pires MD  07/22/19 1813

## 2019-09-17 ENCOUNTER — HOSPITAL ENCOUNTER (EMERGENCY)
Facility: HOSPITAL | Age: 19
Discharge: HOME | End: 2019-09-17
Attending: EMERGENCY MEDICINE
Payer: MEDICAID

## 2019-09-17 VITALS
HEIGHT: 61 IN | WEIGHT: 255 LBS | SYSTOLIC BLOOD PRESSURE: 133 MMHG | BODY MASS INDEX: 48.15 KG/M2 | HEART RATE: 110 BPM | TEMPERATURE: 98 F | OXYGEN SATURATION: 98 % | DIASTOLIC BLOOD PRESSURE: 78 MMHG | RESPIRATION RATE: 16 BRPM

## 2019-09-17 DIAGNOSIS — H60.502 ACUTE OTITIS EXTERNA OF LEFT EAR, UNSPECIFIED TYPE: Primary | ICD-10-CM

## 2019-09-17 DIAGNOSIS — H66.92 LEFT OTITIS MEDIA, UNSPECIFIED OTITIS MEDIA TYPE: ICD-10-CM

## 2019-09-17 LAB
B-HCG UR QL: NEGATIVE
CTP QC/QA: YES

## 2019-09-17 PROCEDURE — 25000003 PHARM REV CODE 250: Mod: ER | Performed by: EMERGENCY MEDICINE

## 2019-09-17 PROCEDURE — 81025 URINE PREGNANCY TEST: CPT | Mod: ER | Performed by: EMERGENCY MEDICINE

## 2019-09-17 PROCEDURE — 99284 EMERGENCY DEPT VISIT MOD MDM: CPT | Mod: 25,ER

## 2019-09-17 PROCEDURE — 63600175 PHARM REV CODE 636 W HCPCS: Mod: ER | Performed by: EMERGENCY MEDICINE

## 2019-09-17 PROCEDURE — 96372 THER/PROPH/DIAG INJ SC/IM: CPT | Mod: ER

## 2019-09-17 RX ORDER — LIDOCAINE HYDROCHLORIDE 10 MG/ML
5 INJECTION INFILTRATION; PERINEURAL
Status: COMPLETED | OUTPATIENT
Start: 2019-09-17 | End: 2019-09-17

## 2019-09-17 RX ORDER — AMOXICILLIN AND CLAVULANATE POTASSIUM 875; 125 MG/1; MG/1
1 TABLET, FILM COATED ORAL 2 TIMES DAILY
Qty: 20 TABLET | Refills: 0 | OUTPATIENT
Start: 2019-09-17 | End: 2019-09-18

## 2019-09-17 RX ORDER — CEFTRIAXONE 1 G/1
0.5 INJECTION, POWDER, FOR SOLUTION INTRAMUSCULAR; INTRAVENOUS
Status: COMPLETED | OUTPATIENT
Start: 2019-09-17 | End: 2019-09-17

## 2019-09-17 RX ORDER — ACETAMINOPHEN 500 MG
1000 TABLET ORAL
Status: COMPLETED | OUTPATIENT
Start: 2019-09-17 | End: 2019-09-17

## 2019-09-17 RX ORDER — ACETAMINOPHEN 500 MG
1000 TABLET ORAL EVERY 6 HOURS PRN
Qty: 30 TABLET | Refills: 0 | Status: SHIPPED | OUTPATIENT
Start: 2019-09-17 | End: 2021-02-26

## 2019-09-17 RX ORDER — CIPROFLOXACIN AND DEXAMETHASONE 3; 1 MG/ML; MG/ML
4 SUSPENSION/ DROPS AURICULAR (OTIC) 2 TIMES DAILY
Qty: 7.5 ML | Refills: 0 | Status: SHIPPED | OUTPATIENT
Start: 2019-09-17 | End: 2019-09-24

## 2019-09-17 RX ADMIN — ACETAMINOPHEN 1000 MG: 500 TABLET ORAL at 08:09

## 2019-09-17 RX ADMIN — LIDOCAINE HYDROCHLORIDE 5 ML: 10 INJECTION, SOLUTION INFILTRATION; PERINEURAL at 08:09

## 2019-09-17 RX ADMIN — CEFTRIAXONE SODIUM 0.5 G: 1 INJECTION, POWDER, FOR SOLUTION INTRAMUSCULAR; INTRAVENOUS at 08:09

## 2019-09-17 NOTE — ED PROVIDER NOTES
Encounter Date: 9/17/2019    SCRIBE #1 NOTE: I, Savannah Petersen , am scribing for, and in the presence of,  Dr. Akila Li . I have scribed the following portions of the note - Other sections scribed: HPI, ROS, PE.       History     Chief Complaint   Patient presents with    Otalgia     pain to bilat ears, worse in left since yest. denies fever     Eliu Hunter is a 19 y.o. female who presents to the ED complaining of worsening left ear pain beginning yesterday. Reports left ear pressure, left ear drainage and left cervical lymphnode pain. She admits nausea and mild sore throat, but denies fever, chills, SOB, CP, vomiting, congestion, and diarrhea. Notes history of sinus issues. Patient denies any recent swimming. Denies taking any medications PTA. She reports high number of medication allergies.     The history is provided by the patient and a significant other. No  was used.     Review of patient's allergies indicates:   Allergen Reactions    Advair diskus [fluticasone propion-salmeterol] Anaphylaxis    Aspartame Anaphylaxis     Other reaction(s): UNKNOWN    Bupropion Anaphylaxis    Chloral hydrate analogues Other (See Comments)     Adverse reaction per grandma    Imitrex [sumatriptan succinate] Anaphylaxis    Ugo Anaphylaxis    Metoprolol Shortness Of Breath    Nitroglycerin Anaphylaxis     Other reaction(s): stop breathing    Quetiapine Hallucinations     Other reaction(s): HALLUCINATION    Reglan [metoclopramide hcl] Shortness Of Breath    Tomato (solanum lycopersicum) Anaphylaxis and Shortness Of Breath    Unable to assess Anaphylaxis     Crabs/not allergic to iodine    Zantac [ranitidine hcl] Shortness Of Breath    Hibiclens (isopropyl alcohol) Itching    Abilify [aripiprazole]     Ambien [zolpidem]     Banana      Other reaction(s): STOP BREATHING    Chloral hydrate      Other reaction(s): adverse affect    Compazine [prochlorperazine edisylate]     Crab       Other reaction(s): STOP BREATHING    Desmopressin     Eggplant      Other reaction(s): EGG PLANT    Honey      Other reaction(s): LIP SWELLING    Ketorolac      Other reaction(s): RASH    Kiwi (actinidia chinensis)     Lexapro [escitalopram oxalate]     Meclizine      hives    Metoclopramide      Other reaction(s): SHORTNESS OF BREATH    Ondansetron      Other reaction(s): RASH    Prochlorperazine      Other reaction(s): RASH    Remeron [mirtazapine]     Sucralose      Other reaction(s): STOPS BREATHING    Tomato      Other reaction(s): stop breathing    Xanax [alprazolam]     Zofran [ondansetron hcl (pf)]     Mayonnaise Rash     Other reaction(s): STOP BREATHING    Sulfa (sulfonamide antibiotics) Rash and Nausea And Vomiting     Past Medical History:   Diagnosis Date    Asthma     Brain tumor, pineal gland     Depression     Endometriosis     Hypertension     Precocious puberty     Seizures     Syncope, cardiogenic     Thyroid disease      Past Surgical History:   Procedure Laterality Date     SECTION      COLONOSCOPY N/A 2014    [facility]    COLONOSCOPY W/ BIOPSIES      DELIVERY- SECTION N/A 2018    Performed by West Garcia MD at [facility] L&D OR    EGD (ESOPHAGOGASTRODUODENOSCOPY) N/A 2014    [facility]formed by [facility] at [facility] (2ND FLR)    EGD (ESOPHAGOGASTRODUODENOSCOPY) N/A 10/11/2013    Performed by Hasmukh Caputo MD at [facility] ENDO (2ND FLR)    ESOPHAGOGASTRODUODENOSCOPY      implantable cardiac monitor placement  2018    TONSILLECTOMY      TYMPANOSTOMY TUBE PLACEMENT       Family History   Problem Relation Age of Onset    Heart disease Mother     Hyperlipidemia Mother     Asthma Mother     Crohn's disease Mother 37        pending surgery. prior med; sulfasalazine    Depression Mother     Bipolar disorder Mother     Arthritis Mother     Anxiety disorder Mother      Other Mother         PTSD    Hypertension Maternal Grandmother     Asthma Maternal Grandmother     Depression Maternal Grandmother     Anxiety disorder Maternal Grandmother     Hypertension Paternal Grandmother     Diabetes Paternal Grandmother     Nephrolithiasis Father     Nephrolithiasis Brother     ADD / ADHD Brother     Depression Brother     Bipolar disorder Brother     Crohn's disease Maternal Grandfather     Alcohol abuse Paternal Grandfather     Breast cancer Neg Hx     Colon cancer Neg Hx     Ovarian cancer Neg Hx      Social History     Tobacco Use    Smoking status: Passive Smoke Exposure - Never Smoker    Smokeless tobacco: Never Used   Substance Use Topics    Alcohol use: No    Drug use: No     Review of Systems   Constitutional: Negative for chills and fever.   HENT: Positive for ear discharge (+ LEFT ear), ear pain (+ LEFT ear pain and pressure) and sore throat. Negative for congestion.    Eyes: Negative.  Negative for redness.   Respiratory: Negative for shortness of breath.    Cardiovascular: Negative for chest pain.   Gastrointestinal: Positive for nausea. Negative for diarrhea and vomiting.   Endocrine: Negative.    Genitourinary: Negative.  Negative for dysuria.   Musculoskeletal: Negative.    Skin: Negative.  Negative for rash.   Allergic/Immunologic: Negative.    Neurological: Negative for dizziness, weakness and numbness.   Hematological: Positive for adenopathy (+ LEFT side of neck ).   Psychiatric/Behavioral: Negative.    All other systems reviewed and are negative.      Physical Exam     Initial Vitals [09/17/19 0808]   BP Pulse Resp Temp SpO2   133/78 110 16 98.4 °F (36.9 °C) 98 %      MAP       --         Physical Exam    Nursing note and vitals reviewed.  Constitutional: She appears well-developed and well-nourished.     Patient gave consent to have physical exam performed.     HENT:   Head: Normocephalic and atraumatic.   Right Ear: Hearing, tympanic membrane,  external ear and ear canal normal.   Left Ear: External ear normal. There is swelling and tenderness. No mastoid tenderness. Tympanic membrane is erythematous.   Nose: Nose normal.   Mouth/Throat: Oropharynx is clear and moist.   + Left ear white discharge.      Eyes: Conjunctivae and EOM are normal. Pupils are equal, round, and reactive to light.   Neck: Normal range of motion and phonation normal. Neck supple.   Cardiovascular: Normal rate, regular rhythm, normal heart sounds and intact distal pulses. Exam reveals no gallop and no friction rub.    No murmur heard.  Pulmonary/Chest: Effort normal and breath sounds normal. No stridor. No respiratory distress. She has no wheezes. She has no rhonchi. She has no rales. She exhibits no tenderness.   Abdominal: Soft. Bowel sounds are normal. She exhibits no distension. There is no tenderness. There is no rigidity, no rebound and no guarding.   Musculoskeletal: Normal range of motion. She exhibits no edema or tenderness.   Lymphadenopathy:     She has cervical adenopathy (+ Left side).   Neurological: She is alert and oriented to person, place, and time. She has normal strength. No cranial nerve deficit or sensory deficit. GCS score is 15. GCS eye subscore is 4. GCS verbal subscore is 5. GCS motor subscore is 6.   Skin: Skin is warm and dry. Capillary refill takes less than 2 seconds. No rash noted.   Psychiatric: She has a normal mood and affect. Her behavior is normal.         ED Course   Procedures  Labs Reviewed   POCT URINE PREGNANCY               Medical Decision Making:   History:   Old Medical Records: I decided to obtain old medical records.  Differential Diagnosis:   URI, otitis externa, otitis media, pharyngitis, and pregnancy.   Clinical Tests:   Lab Tests: Ordered and Reviewed  The following lab test(s) were unremarkable: UPT  ED Management:  Will order UPT.     Will treat with cefTRIAXone injection 0.5 g, lidocaine HCL 10 mg/ml (1%) injection 5 mL and  acetaminophen tablet 1,000 mg.           Chief complaint: Left ear pain  Differential diagnosis: URI, otitis externa, otitis media, pharyngitis, and pregnancy.   Treatment in the ED, Tylenol, and ceftriaxone.  Patient reports feeling better after treatment in the ER.    Discussed treatment, prescriptions, labs, and imaging results.  Fill and take prescriptions as directed.  Return to the ED if symptoms worsen or do not resolve.   Answered questions and discussed discharge plan.    Patient feels better and is ready for discharge.  Follow up with PCP/specialist in 1 day.            Scribe Attestation:   Scribe #1: I performed the above scribed service and the documentation accurately describes the services I performed. I attest to the accuracy of the note.     I, Dr. Akila Li, personally performed the services described in this documentation. This document was produced by a scribe under my direction and in my presence. All medical record entries made by the scribe were at my direction and in my presence.  I have reviewed the chart and agree that the record reflects my personal performance and is accurate and complete. Akila Li DO.     09/17/2019 11:52 AM             Clinical Impression:     1. Acute otitis externa of left ear, unspecified type    2. Left otitis media, unspecified otitis media type                                   Akila Li DO  09/17/19 8571

## 2019-09-18 ENCOUNTER — HOSPITAL ENCOUNTER (EMERGENCY)
Facility: HOSPITAL | Age: 19
Discharge: HOME | End: 2019-09-18
Attending: EMERGENCY MEDICINE
Payer: MEDICAID

## 2019-09-18 ENCOUNTER — TELEPHONE (OUTPATIENT)
Dept: EMERGENCY MEDICINE | Facility: HOSPITAL | Age: 19
End: 2019-09-18

## 2019-09-18 VITALS
SYSTOLIC BLOOD PRESSURE: 135 MMHG | DIASTOLIC BLOOD PRESSURE: 87 MMHG | HEIGHT: 61 IN | HEART RATE: 112 BPM | OXYGEN SATURATION: 99 % | TEMPERATURE: 98 F | RESPIRATION RATE: 18 BRPM | WEIGHT: 255 LBS | BODY MASS INDEX: 48.15 KG/M2

## 2019-09-18 VITALS
RESPIRATION RATE: 18 BRPM | OXYGEN SATURATION: 99 % | BODY MASS INDEX: 48.15 KG/M2 | SYSTOLIC BLOOD PRESSURE: 154 MMHG | DIASTOLIC BLOOD PRESSURE: 84 MMHG | HEART RATE: 111 BPM | TEMPERATURE: 99 F | WEIGHT: 255 LBS | HEIGHT: 61 IN

## 2019-09-18 DIAGNOSIS — H66.92 LEFT OTITIS MEDIA, UNSPECIFIED OTITIS MEDIA TYPE: Primary | ICD-10-CM

## 2019-09-18 DIAGNOSIS — H60.90 OTITIS EXTERNA, UNSPECIFIED CHRONICITY, UNSPECIFIED LATERALITY, UNSPECIFIED TYPE: Primary | ICD-10-CM

## 2019-09-18 DIAGNOSIS — R11.2 NON-INTRACTABLE VOMITING WITH NAUSEA, UNSPECIFIED VOMITING TYPE: ICD-10-CM

## 2019-09-18 DIAGNOSIS — R11.0 NAUSEA: ICD-10-CM

## 2019-09-18 DIAGNOSIS — H60.92 OTITIS EXTERNA OF LEFT EAR, UNSPECIFIED CHRONICITY, UNSPECIFIED TYPE: ICD-10-CM

## 2019-09-18 DIAGNOSIS — T50.905A MEDICATION ADVERSE EFFECT, INITIAL ENCOUNTER: ICD-10-CM

## 2019-09-18 LAB
ALBUMIN SERPL-MCNC: 4 G/DL (ref 3.3–5.5)
ALP SERPL-CCNC: 65 U/L (ref 42–141)
B-HCG UR QL: NEGATIVE
BILIRUB SERPL-MCNC: 0.8 MG/DL (ref 0.2–1.6)
BUN SERPL-MCNC: 8 MG/DL (ref 7–22)
CALCIUM SERPL-MCNC: 9.3 MG/DL (ref 8–10.3)
CHLORIDE SERPL-SCNC: 103 MMOL/L (ref 98–108)
CREAT SERPL-MCNC: 0.7 MG/DL (ref 0.6–1.2)
CTP QC/QA: YES
GLUCOSE SERPL-MCNC: 87 MG/DL (ref 73–118)
POC ALT (SGPT): 43 U/L (ref 10–47)
POC AST (SGOT): 28 U/L (ref 11–38)
POC TCO2: 25 MMOL/L (ref 18–33)
POTASSIUM BLD-SCNC: 4.2 MMOL/L (ref 3.6–5.1)
PROTEIN, POC: 8.2 G/DL (ref 6.4–8.1)
SODIUM BLD-SCNC: 137 MMOL/L (ref 128–145)

## 2019-09-18 PROCEDURE — 96372 THER/PROPH/DIAG INJ SC/IM: CPT | Mod: ER

## 2019-09-18 PROCEDURE — 81025 URINE PREGNANCY TEST: CPT | Mod: ER | Performed by: EMERGENCY MEDICINE

## 2019-09-18 PROCEDURE — 99284 PR EMERGENCY DEPT VISIT,LEVEL IV: ICD-10-PCS | Mod: ,,, | Performed by: PHYSICIAN ASSISTANT

## 2019-09-18 PROCEDURE — 99284 EMERGENCY DEPT VISIT MOD MDM: CPT | Mod: 25,ER

## 2019-09-18 PROCEDURE — 80053 COMPREHEN METABOLIC PANEL: CPT | Mod: ER

## 2019-09-18 PROCEDURE — 85025 COMPLETE CBC W/AUTO DIFF WBC: CPT | Mod: ER

## 2019-09-18 PROCEDURE — 96360 HYDRATION IV INFUSION INIT: CPT | Mod: ER

## 2019-09-18 PROCEDURE — 25000003 PHARM REV CODE 250: Mod: ER | Performed by: EMERGENCY MEDICINE

## 2019-09-18 PROCEDURE — 99283 EMERGENCY DEPT VISIT LOW MDM: CPT | Mod: 25,27

## 2019-09-18 PROCEDURE — 99284 EMERGENCY DEPT VISIT MOD MDM: CPT | Mod: ,,, | Performed by: PHYSICIAN ASSISTANT

## 2019-09-18 PROCEDURE — 25000003 PHARM REV CODE 250: Performed by: PHYSICIAN ASSISTANT

## 2019-09-18 PROCEDURE — 63600175 PHARM REV CODE 636 W HCPCS: Mod: ER | Performed by: PHYSICIAN ASSISTANT

## 2019-09-18 PROCEDURE — 25000003 PHARM REV CODE 250: Mod: ER | Performed by: PHYSICIAN ASSISTANT

## 2019-09-18 RX ORDER — PROMETHAZINE HYDROCHLORIDE 25 MG/ML
25 INJECTION, SOLUTION INTRAMUSCULAR; INTRAVENOUS
Status: COMPLETED | OUTPATIENT
Start: 2019-09-18 | End: 2019-09-18

## 2019-09-18 RX ORDER — DOXYCYCLINE 100 MG/1
100 CAPSULE ORAL 2 TIMES DAILY
Qty: 20 CAPSULE | Refills: 0 | Status: SHIPPED | OUTPATIENT
Start: 2019-09-18 | End: 2019-09-28

## 2019-09-18 RX ORDER — HYDROCODONE BITARTRATE AND ACETAMINOPHEN 5; 325 MG/1; MG/1
1 TABLET ORAL EVERY 6 HOURS PRN
Qty: 8 TABLET | Refills: 0 | Status: SHIPPED | OUTPATIENT
Start: 2019-09-18 | End: 2023-12-15

## 2019-09-18 RX ORDER — HYDROCODONE BITARTRATE AND ACETAMINOPHEN 5; 325 MG/1; MG/1
1 TABLET ORAL
Status: COMPLETED | OUTPATIENT
Start: 2019-09-18 | End: 2019-09-18

## 2019-09-18 RX ORDER — AMOXICILLIN AND CLAVULANATE POTASSIUM 875; 125 MG/1; MG/1
1 TABLET, FILM COATED ORAL
Status: COMPLETED | OUTPATIENT
Start: 2019-09-18 | End: 2019-09-18

## 2019-09-18 RX ORDER — PROMETHAZINE HYDROCHLORIDE 25 MG/1
25 SUPPOSITORY RECTAL EVERY 4 HOURS PRN
Qty: 12 SUPPOSITORY | Refills: 0 | OUTPATIENT
Start: 2019-09-18 | End: 2022-04-01

## 2019-09-18 RX ORDER — TRAMADOL HYDROCHLORIDE 50 MG/1
50 TABLET ORAL
Status: COMPLETED | OUTPATIENT
Start: 2019-09-18 | End: 2019-09-18

## 2019-09-18 RX ORDER — PROMETHAZINE HYDROCHLORIDE 25 MG/1
25 TABLET ORAL
Status: COMPLETED | OUTPATIENT
Start: 2019-09-18 | End: 2019-09-18

## 2019-09-18 RX ORDER — PROMETHAZINE HYDROCHLORIDE 25 MG/1
25 TABLET ORAL EVERY 6 HOURS PRN
Qty: 15 TABLET | Refills: 0 | OUTPATIENT
Start: 2019-09-18 | End: 2019-09-18

## 2019-09-18 RX ADMIN — TRAMADOL HYDROCHLORIDE 50 MG: 50 TABLET, FILM COATED ORAL at 10:09

## 2019-09-18 RX ADMIN — SODIUM CHLORIDE 1000 ML: 0.9 INJECTION, SOLUTION INTRAVENOUS at 09:09

## 2019-09-18 RX ADMIN — AMOXICILLIN AND CLAVULANATE POTASSIUM 1 TABLET: 875; 125 TABLET, FILM COATED ORAL at 09:09

## 2019-09-18 RX ADMIN — PROMETHAZINE HYDROCHLORIDE 25 MG: 25 INJECTION INTRAMUSCULAR; INTRAVENOUS at 07:09

## 2019-09-18 RX ADMIN — PROMETHAZINE HYDROCHLORIDE 25 MG: 25 TABLET ORAL at 09:09

## 2019-09-18 RX ADMIN — HYDROCODONE BITARTRATE AND ACETAMINOPHEN 1 TABLET: 5; 325 TABLET ORAL at 07:09

## 2019-09-18 NOTE — ED PROVIDER NOTES
Encounter Date: 9/18/2019    SCRIBE #1 NOTE: I, Radha Knowles, am scribing for, and in the presence of,  SAMANTHA Chiang. I have scribed the following portions of the note - Other sections scribed: HPI, ROS, PE.       History     Chief Complaint   Patient presents with    Otalgia     Pt reports she was here two day ago for problems with left ear.  She reports that the same problem is beginning in the right ear.  Also c/o vomiting onset yesterday.  Pt reports that we called here to return.     Eliu Hunter is a 19 y.o. female who presents to the ED complaining of fever and left ear pain which is now including the right ear x3 days. Pt states she was seen here 2 days ago, and can not take the antibiotic medication prescribed to her by mouth because of N/V.  She went to another hospital this morning, was treated with Phenergan and given a dose of her antibiotic in the ED, but vomited the antibiotic, and has been unable to take her Phenergan at home.  Pt denies sore throat and rhinorrhea. She has a long list of medication allergies, including most antiemetics.        Review of patient's allergies indicates:   Allergen Reactions    Advair diskus [fluticasone propion-salmeterol] Anaphylaxis    Aspartame Anaphylaxis     Other reaction(s): UNKNOWN    Bupropion Anaphylaxis    Chloral hydrate analogues Other (See Comments)     Adverse reaction per grandma    Imitrex [sumatriptan succinate] Anaphylaxis    Ugo Anaphylaxis    Metoprolol Shortness Of Breath    Nitroglycerin Anaphylaxis     Other reaction(s): stop breathing    Quetiapine Hallucinations     Other reaction(s): HALLUCINATION    Reglan [metoclopramide hcl] Shortness Of Breath    Tomato (solanum lycopersicum) Anaphylaxis and Shortness Of Breath    Unable to assess Anaphylaxis     Crabs/not allergic to iodine    Zantac [ranitidine hcl] Shortness Of Breath    Hibiclens (isopropyl alcohol) Itching    Abilify [aripiprazole]     Ambien [zolpidem]      Banana      Other reaction(s): STOP BREATHING    Chloral hydrate      Other reaction(s): adverse affect    Compazine [prochlorperazine edisylate]     Crab      Other reaction(s): STOP BREATHING    Desmopressin     Eggplant      Other reaction(s): EGG PLANT    Honey      Other reaction(s): LIP SWELLING    Ketorolac      Other reaction(s): RASH    Kiwi (actinidia chinensis)     Lexapro [escitalopram oxalate]     Meclizine      hives    Metoclopramide      Other reaction(s): SHORTNESS OF BREATH    Ondansetron      Other reaction(s): RASH    Prochlorperazine      Other reaction(s): RASH    Remeron [mirtazapine]     Sucralose      Other reaction(s): STOPS BREATHING    Tomato      Other reaction(s): stop breathing    Xanax [alprazolam]     Zofran [ondansetron hcl (pf)]     Mayonnaise Rash     Other reaction(s): STOP BREATHING    Sulfa (sulfonamide antibiotics) Rash and Nausea And Vomiting     Past Medical History:   Diagnosis Date    Asthma     Brain tumor, pineal gland     Depression     Endometriosis     Hypertension     Precocious puberty     Seizures     Syncope, cardiogenic     Thyroid disease      Past Surgical History:   Procedure Laterality Date     SECTION      COLONOSCOPY N/A 2014    Performed by [facility]illo [facility] at [facility] ENDO (2ND FLR)    COLONOSCOPY W/ BIOPSIES      DELIVERY- SECTION N/A 2018    Performed by West Garcia MD at [facility] L[facility]D OR    EGD (ESOPHAGOGASTRODUODENOSCOPY) N/A 2014    Performed by Sav[facility]illo MD at [facility] ENDO (2ND FLR)    EGD (ESOPHAGOGASTRODUODENOSCOPY) N/A 10/11/2013    Performed by Hasmukh Caputo MD at [facility] ENDO (2ND FLR)    ESOPHAGOGASTRODUODENOSCOPY      implantable cardiac monitor placement  2018    TONSILLECTOMY      TYMPANOSTOMY TUBE PLACEMENT       Family History   Problem Relation Age of Onset    Heart disease Mother     Hyperlipidemia Mother     Asthma Mother     Crohn's disease  Mother 37        pending surgery. prior med; sulfasalazine    Depression Mother     Bipolar disorder Mother     Arthritis Mother     Anxiety disorder Mother     Other Mother         PTSD    Hypertension Maternal Grandmother     Asthma Maternal Grandmother     Depression Maternal Grandmother     Anxiety disorder Maternal Grandmother     Hypertension Paternal Grandmother     Diabetes Paternal Grandmother     Nephrolithiasis Father     Nephrolithiasis Brother     ADD / ADHD Brother     Depression Brother     Bipolar disorder Brother     Crohn's disease Maternal Grandfather     Alcohol abuse Paternal Grandfather     Breast cancer Neg Hx     Colon cancer Neg Hx     Ovarian cancer Neg Hx      Social History     Tobacco Use    Smoking status: Passive Smoke Exposure - Never Smoker    Smokeless tobacco: Never Used   Substance Use Topics    Alcohol use: No    Drug use: No     Review of Systems   Constitutional: Positive for fever.   HENT: Positive for ear pain. Negative for rhinorrhea and sore throat.    Eyes: Negative.    Respiratory: Negative.  Negative for shortness of breath.    Cardiovascular: Negative.  Negative for chest pain.   Gastrointestinal: Positive for nausea and vomiting.   Endocrine: Negative.    Genitourinary: Negative.  Negative for dysuria.   Musculoskeletal: Negative.  Negative for myalgias.   Skin: Negative.  Negative for rash.   Allergic/Immunologic: Negative.    Neurological: Negative.  Negative for headaches.   Hematological: Negative.  Negative for adenopathy.   Psychiatric/Behavioral: Negative.  Negative for behavioral problems.   All other systems reviewed and are negative.      Physical Exam     Initial Vitals [09/18/19 1647]   BP Pulse Resp Temp SpO2   (!) 139/94 (!) 114 18 98.8 °F (37.1 °C) 98 %      MAP       --         Physical Exam    Nursing note and vitals reviewed.  Constitutional: She appears well-developed and well-nourished.   HENT:   Head: Normocephalic and  atraumatic.   Right Ear: Tympanic membrane and external ear normal. There is tenderness.   Left Ear: External ear normal. There is drainage, swelling and tenderness.   Nose: Nose normal.   Eyes: Conjunctivae are normal.   Neck: Normal range of motion. Neck supple.   Cardiovascular: Normal rate and intact distal pulses.   Pulmonary/Chest: Effort normal. No respiratory distress.   Abdominal: Soft. There is no tenderness.   Musculoskeletal: Normal range of motion.   Neurological: She is alert and oriented to person, place, and time.   Skin: Skin is warm and dry. Capillary refill takes less than 2 seconds.   Psychiatric: She has a normal mood and affect. Her behavior is normal.         ED Course   Procedures  Labs Reviewed   POCT CMP - Abnormal; Notable for the following components:       Result Value    Protein 8.2 (*)     All other components within normal limits   POCT URINE PREGNANCY   POCT CBC   POCT CMP          Imaging Results    None          Medical Decision Making:   History:   Old Medical Records: I decided to obtain old medical records.  Clinical Tests:   Lab Tests: Reviewed and Ordered  ED Management:  19-year-old female with bilateral otitis externa, and inability to take medications due to nausea vomiting. She is uncomfortable, afebrile and nontoxic.  No evidence of malignant otitis externa or facial cellulitis.  She is not septic.  She was given Phenergan IM with little improvement.  Patient also given IV fluids and analgesics with mild improvement in symptoms. She has a with sip water, although still has nausea.  Part of her problem may be from the Augmentin, which will switch to doxycycline.  She is able to keep down her p.o. meds in the ED.  Will provide Phenergan suppositories, and instructions for clear liquid diet until ear pain is improving and/or nausea completely subsides.  I placed an ear wick in her left ear, and instructed her to continue the Ciprodex drops as previously prescribed.  She was  discharged with follow-up information return precautions.  She is comfortable with the plan.  Case discussed with Dr. Arellano.             Scribe Attestation:   Scribe #1: I performed the above scribed service and the documentation accurately describes the services I performed. I attest to the accuracy of the note.    Bharathi Funk           Clinical Impression:     1. Otitis externa, unspecified chronicity, unspecified laterality, unspecified type    2. Non-intractable vomiting with nausea, unspecified vomiting type    3. Medication adverse effect, initial encounter                                   Bharathi Funk, PA-C  09/18/19 1733

## 2019-09-18 NOTE — ED NOTES
Provide pt with urine specimen collection cup and sanitary wipe and escorted to the bathroom.  Pt instructed on collection - verbalizes understanding.

## 2019-09-18 NOTE — ED NOTES
Pt's first and last name and birthday confirmed.   LOC: The patient is awake, alert and aware of environment with a flat affect, the patient is oriented x 3 and speaking appropriately.  APPEARANCE: Patient resting comfortably and in no acute distress, patient is clean and well groomed.  SKIN: The skin is warm and dry, patient has normal skin turgor and moist mucus membranes, skin intact, no breakdown or brusing noted.  MUSKULOSKELETAL: Patient moving all extremities well, no obvious swelling or deformities noted.  RESPIRATORY: Airway is open and patent, respirations are spontaneous, patient has a normal effort and rate.   NEURO: No neuro deficits,  no drift noted, no facial droop noted. Speech is clear.

## 2019-09-18 NOTE — ED NOTES
Discharge home with family, states understanding to follow up as directed. Ambulates out of ED without difficulty. RX given, Med prior to discharge

## 2019-09-18 NOTE — ED PROVIDER NOTES
Encounter Date: 9/18/2019       History     Chief Complaint   Patient presents with    Otalgia     L ear seen yest , not able to keep antibiotics down, feels more swollen     19-year-old white female with history of endometriosis, depression, hypertension, seizures presents to the ED complaining of left otalgia for the past 3 days.  She was seen at the [place] ED yesterday where she was prescribed Ciprodex and Augmentin.  She states she is unable to take the Augmentin due to extreme nausea.  She has not had any episodes of emesis.  She reports fever (last dose of Tylenol at 3:30 a.m.), chills, headache. She does report a history of recurrent ear infection is a followed by an ENT at [facility].  She denies chest pain, shortness breath, sore throat, rhinorrhea, abdominal pain.    The history is provided by the patient.     Review of patient's allergies indicates:   Allergen Reactions    Advair diskus [fluticasone propion-salmeterol] Anaphylaxis    Aspartame Anaphylaxis     Other reaction(s): UNKNOWN    Bupropion Anaphylaxis    Chloral hydrate analogues Other (See Comments)     Adverse reaction per grandma    Imitrex [sumatriptan succinate] Anaphylaxis    Ugo Anaphylaxis    Metoprolol Shortness Of Breath    Nitroglycerin Anaphylaxis     Other reaction(s): stop breathing    Quetiapine Hallucinations     Other reaction(s): HALLUCINATION    Reglan [metoclopramide hcl] Shortness Of Breath    Tomato (solanum lycopersicum) Anaphylaxis and Shortness Of Breath    Unable to assess Anaphylaxis     Crabs/not allergic to iodine    Zantac [ranitidine hcl] Shortness Of Breath    Hibiclens (isopropyl alcohol) Itching    Abilify [aripiprazole]     Ambien [zolpidem]     Banana      Other reaction(s): STOP BREATHING    Chloral hydrate      Other reaction(s): adverse affect    Compazine [prochlorperazine edisylate]     Crab      Other reaction(s): STOP BREATHING    Desmopressin     Eggplant      Other reaction(s):  EGG PLANT    Honey      Other reaction(s): LIP SWELLING    Ketorolac      Other reaction(s): RASH    Kiwi (actinidia chinensis)     Lexapro [escitalopram oxalate]     Meclizine      hives    Metoclopramide      Other reaction(s): SHORTNESS OF BREATH    Ondansetron      Other reaction(s): RASH    Prochlorperazine      Other reaction(s): RASH    Remeron [mirtazapine]     Sucralose      Other reaction(s): STOPS BREATHING    Tomato      Other reaction(s): stop breathing    Xanax [alprazolam]     Zofran [ondansetron hcl (pf)]     Mayonnaise Rash     Other reaction(s): STOP BREATHING    Sulfa (sulfonamide antibiotics) Rash and Nausea And Vomiting     Past Medical History:   Diagnosis Date    Asthma     Brain tumor, pineal gland     Depression     Endometriosis     Hypertension     Precocious puberty     Seizures     Syncope, cardiogenic     Thyroid disease      Past Surgical History:   Procedure Laterality Date     SECTION      COLONOSCOPY N/A 2014    [facility] ([facility])    COLONOSCOPY W/ BIOPSIES      DELIVERY- SECTION N/A 2018    Performed by West Garcia [facility] at [facility] OR    EGD (ESOPHAGOGASTRODUODENOSCOPY) N/A 2014    Per[facility] (2ND FLR)    EGD (ESOPHAGOGASTRODUODENOSCOPY) N/A 10/11/2013    Per[facility] by Hasmukh Caputo [facility] at [facility] (2ND FLR)    ESOPHAGOGASTRODUODENOSCOPY      implantable cardiac monitor placement  2018    TONSILLECTOMY      TYMPANOSTOMY TUBE PLACEMENT       Family History   Problem Relation Age of Onset    Heart disease Mother     Hyperlipidemia Mother     Asthma Mother     Crohn's disease Mother 37        pending surgery. prior med; sulfasalazine    Depression Mother     Bipolar disorder Mother     Arthritis Mother     Anxiety disorder Mother     Other Mother         PTSD    Hypertension Maternal Grandmother     Asthma Maternal  Grandmother     Depression Maternal Grandmother     Anxiety disorder Maternal Grandmother     Hypertension Paternal Grandmother     Diabetes Paternal Grandmother     Nephrolithiasis Father     Nephrolithiasis Brother     ADD / ADHD Brother     Depression Brother     Bipolar disorder Brother     Crohn's disease Maternal Grandfather     Alcohol abuse Paternal Grandfather     Breast cancer Neg Hx     Colon cancer Neg Hx     Ovarian cancer Neg Hx      Social History     Tobacco Use    Smoking status: Passive Smoke Exposure - Never Smoker    Smokeless tobacco: Never Used   Substance Use Topics    Alcohol use: No    Drug use: No     Review of Systems   Constitutional: Positive for chills and fever.   HENT: Positive for ear discharge and ear pain. Negative for congestion, rhinorrhea and sore throat.    Eyes: Negative for photophobia and visual disturbance.   Respiratory: Negative for cough and shortness of breath.    Cardiovascular: Negative for chest pain.   Gastrointestinal: Positive for nausea. Negative for abdominal pain, constipation, diarrhea and vomiting.   Genitourinary: Negative for dysuria and hematuria.   Musculoskeletal: Negative for neck pain and neck stiffness.   Skin: Negative for rash and wound.   Neurological: Positive for headaches. Negative for numbness.   Psychiatric/Behavioral: Negative for confusion.       Physical Exam     Initial Vitals [09/18/19 0801]   BP Pulse Resp Temp SpO2   135/87 (!) 112 18 98.2 °F (36.8 °C) 99 %      MAP       --         Physical Exam    Nursing note and vitals reviewed.  Constitutional: She appears well-developed and well-nourished. She is not diaphoretic. No distress.   HENT:   Head: Normocephalic and atraumatic.   Right Ear: Tympanic membrane normal. There is swelling.   Left Ear: There is swelling.   Significant swelling to the L ear canal. Unable to adequately visualize L TM secondary to otitis externa.    Neck: Normal range of motion. Neck supple.    Cardiovascular: Regular rhythm and normal heart sounds. Tachycardia present.  Exam reveals no gallop and no friction rub.    No murmur heard.  Pulmonary/Chest: Breath sounds normal. She has no wheezes. She has no rhonchi. She has no rales.   Abdominal: Soft. Bowel sounds are normal. There is no tenderness. There is no rebound and no guarding.   Musculoskeletal: Normal range of motion.   Neurological: She is alert and oriented to person, place, and time.   Skin: Skin is warm and dry. No rash noted. No erythema.   Psychiatric: She has a normal mood and affect.         ED Course   Procedures  Labs Reviewed - No data to display       Imaging Results    None          Medical Decision Making:   History:   Old Medical Records: I decided to obtain old medical records.  Old Records Summarized: records from previous admission(s).       <> Summary of Records: Seen at [facility] ED yesterday. Diagnosed with L otitis externa and media. Given IM rocephin in the ED. Discharged with rx for ciprodex drops and augmentin.        APC / Resident Notes:   19-year-old white female with history of endometriosis, depression, hypertension, seizures presents to the ED complaining of left otalgia for the past 3 days.  Tachycardic.  Afebrile.  Lungs are clear.  Abdomen is soft.  There is significant swelling noted to the left ear canal, unable to adequately visualize left TM secondary to otitis externa.  Per ED note from yesterday, patient also has a left otitis media.  She reports she is unable to take her oral antibiotics secondary to nausea.  Will provide Rx for nausea medication.  I do not feel that she needs any labs or imaging at this time.  Stable for discharge.    Given Phenergan and Augmentin in the ED.    She was discharged with a prescription for phenergan.  She will follow up with her ENT.  All of the patient's questions were answered.  I reviewed the patient's chart.                   Clinical Impression:       ICD-10-CM  ICD-9-CM   1. Left otitis media, unspecified otitis media type H66.92 382.9   2. Otitis externa of left ear, unspecified chronicity, unspecified type H60.92 380.10   3. Nausea R11.0 787.02         Disposition:   Disposition: Discharged  Condition: Stable                        Daylin Simon PA-C  09/18/19 1431

## 2019-09-18 NOTE — TELEPHONE ENCOUNTER
Follow up call to patient and she reports that she went to another ER that gave her medicine and she is unable to keep the medication down due to vomiting. Patient will return back to the er for recheck.

## 2019-09-18 NOTE — ED TRIAGE NOTES
Pt presents with left ear pain and nausea. Pt was prescribed amoxicillin for an ear infection  day ago, but is unable to take the antibiotic due to nausea.

## 2019-11-06 DIAGNOSIS — M62.89 PELVIC FLOOR DYSFUNCTION: Primary | ICD-10-CM

## 2019-11-07 ENCOUNTER — TELEPHONE (OUTPATIENT)
Dept: ELECTROPHYSIOLOGY | Facility: CLINIC | Age: 19
End: 2019-11-07

## 2019-11-07 NOTE — TELEPHONE ENCOUNTER
Called Eliu to inquiry about sooner f/u apt she has scheduled with Dr. Hadley's partner, Deedee Gallego, tomorrow for 9:30AM.  Asked Eliu to give me a call back when she receives my message to discuss.

## 2019-11-08 ENCOUNTER — OFFICE VISIT (OUTPATIENT)
Dept: ELECTROPHYSIOLOGY | Facility: CLINIC | Age: 19
End: 2019-11-08
Payer: MEDICAID

## 2019-11-08 ENCOUNTER — CLINICAL SUPPORT (OUTPATIENT)
Dept: CARDIOLOGY | Facility: HOSPITAL | Age: 19
End: 2019-11-08
Attending: INTERNAL MEDICINE
Payer: MEDICAID

## 2019-11-08 VITALS
HEART RATE: 108 BPM | BODY MASS INDEX: 47.99 KG/M2 | HEIGHT: 61 IN | DIASTOLIC BLOOD PRESSURE: 80 MMHG | SYSTOLIC BLOOD PRESSURE: 138 MMHG | WEIGHT: 254.19 LBS

## 2019-11-08 DIAGNOSIS — F43.10 PTSD (POST-TRAUMATIC STRESS DISORDER): ICD-10-CM

## 2019-11-08 DIAGNOSIS — R56.9 SEIZURE-LIKE ACTIVITY: ICD-10-CM

## 2019-11-08 DIAGNOSIS — R55 SYNCOPE AND COLLAPSE: ICD-10-CM

## 2019-11-08 DIAGNOSIS — E66.3 OVERWEIGHT: ICD-10-CM

## 2019-11-08 DIAGNOSIS — Z98.890 HISTORY OF LOOP RECORDER: ICD-10-CM

## 2019-11-08 DIAGNOSIS — Z95.818 STATUS POST PLACEMENT OF IMPLANTABLE LOOP RECORDER: Primary | ICD-10-CM

## 2019-11-08 DIAGNOSIS — E03.9 HYPOTHYROIDISM, UNSPECIFIED TYPE: ICD-10-CM

## 2019-11-08 DIAGNOSIS — R55 SYNCOPE, UNSPECIFIED SYNCOPE TYPE: ICD-10-CM

## 2019-11-08 DIAGNOSIS — R00.2 PALPITATIONS: Primary | ICD-10-CM

## 2019-11-08 DIAGNOSIS — R55 VASOVAGAL SYNCOPE: ICD-10-CM

## 2019-11-08 DIAGNOSIS — Z95.818 STATUS POST PLACEMENT OF IMPLANTABLE LOOP RECORDER: ICD-10-CM

## 2019-11-08 PROCEDURE — 93005 ELECTROCARDIOGRAM TRACING: CPT | Mod: PBBFAC | Performed by: INTERNAL MEDICINE

## 2019-11-08 PROCEDURE — 99999 PR PBB SHADOW E&M-EST. PATIENT-LVL IV: ICD-10-PCS | Mod: PBBFAC,,, | Performed by: NURSE PRACTITIONER

## 2019-11-08 PROCEDURE — 99214 OFFICE O/P EST MOD 30 MIN: CPT | Mod: PBBFAC,25 | Performed by: NURSE PRACTITIONER

## 2019-11-08 PROCEDURE — 99214 OFFICE O/P EST MOD 30 MIN: CPT | Mod: S$PBB,,, | Performed by: NURSE PRACTITIONER

## 2019-11-08 PROCEDURE — 93010 ELECTROCARDIOGRAM REPORT: CPT | Mod: S$PBB,,, | Performed by: INTERNAL MEDICINE

## 2019-11-08 PROCEDURE — 93010 RHYTHM STRIP: ICD-10-PCS | Mod: S$PBB,,, | Performed by: INTERNAL MEDICINE

## 2019-11-08 PROCEDURE — 99999 PR PBB SHADOW E&M-EST. PATIENT-LVL IV: CPT | Mod: PBBFAC,,, | Performed by: NURSE PRACTITIONER

## 2019-11-08 PROCEDURE — 99214 PR OFFICE/OUTPT VISIT, EST, LEVL IV, 30-39 MIN: ICD-10-PCS | Mod: S$PBB,,, | Performed by: NURSE PRACTITIONER

## 2019-11-08 RX ORDER — BETAXOLOL 10 MG/1
TABLET, FILM COATED ORAL
Qty: 30 TABLET | Refills: 3 | Status: SHIPPED | OUTPATIENT
Start: 2019-11-08 | End: 2019-11-22 | Stop reason: SDUPTHER

## 2019-11-08 NOTE — PROGRESS NOTES
Ms. Hunter is a patient of Dr. Hadley and was last seen in clinic 2/22/19.    Subjective:   Patient ID:  Eliu Hunter is a 19 y.o. female who presents for follow-up of palpitations.   HPI:  Ms. Hunter is a 19 y.o. female with palpitations, syncope, seizure-like activity, pineal gland tumor, headaches.     Background:  Chest Pain    Associated symptoms include palpitations. Pertinent negatives include no abdominal pain, dizziness, malaise/fatigue, near-syncope, shortness of breath, syncope or weakness.   Pertinent negatives for past medical history include no muscle weakness.      18 y.o. 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 Sometimes has very little prodrome, if any.  Happened up to 2x/week. No change with pregnancy.  Usually while standing, but has had LOC twice while lying down.  Once, hit head with syncope. She doesn't drive, due to sync hx.     Also has palpitations at times, especially at night.  Also c/o severe episodic HA, focused on L side of head and retroorbital. She says it feels similar to the HAs she was getting when she had the pineal tumor at age 6.     30-day monitor: SR/ST, including during sx. No syncope during that period.  Neuro eval is underway. By report, considering further imaging and  shunt (?). She'd like to see an [facility] neurologist.  BP has been in 140s, with fluctuations.     [facility]. [facility]: baseline tilt negative. SL NTG -> hypotension, HR 68, LOC.  echo 55-60%. normal LA.  Holter: SR/ST. Longest RR: 1670 ms.  ILR (placed 8/2018): SR/ST.    ECG at last visit ST 110s     Most likely vasovagal, given normal heart, normal ECG. However, very short prodrome makes arrhythmic cause possible too. Nothing on 30-day monitor or ILR.  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 Referral to an [facility] neurologist, per pt request.  HTN, relative tachycardia, palpitations: trial of low-dose BB.  Return in 1 year, or earlier prn.     Update (11/08/2019):  Ms. Hunter presents today with c/o of palpitations. She has associated chest discomfort with her palpitations consistent with her previous history. She denies overt chest pain with exertion or at rest, SOB, HASSAN, dizziness, and light headedness. She has had a few syncopal episodes over the past several months. She has no prodrome. She stated during her last syncopal episode she had a seizure post syncope. There are no correlating events on her ILR from these episodes. ILR interrogation today shows EGMs c/w sinus tachycardia with PACs (max 36 min). ILR detected "pauses" these were c/w undersensing, no pauses noted on EGMs. At last visit patient was started on metoprolol  25 mg daily for inappropriate sinus tachycardia/palpitations. She stated she feels the metoprolol made her feel depressed. She denies any other side effects from the metoprolol, although it is noted under her allergies as making her SOB. She denies SOB with metoprolol and would like to try a different BB for her ST.  We will start her on Betaxolol 5 mg daily. LFTs WNL Kidney function is stable, with a creatinine of 0.7 on 7/21/19.  She has still not followed up with neurology for pineal gland tumor (never resected) and syncope/seizure activity due to insurance issues. Her insurance provider recently changed, recommended she follow up with neurology-referral made.   If symptoms are not resolved after neuro eval consider referral to endocrinology for pheochromocytoma work up regarding inappropriate ST.    I have personally reviewed the patient's EKG today, which shows sinus tachycardia at 108 bpm. IN interval is 138. QRS 78. QTc is 471.    Recent Cardiac Tests:   2D Echo (1/26/18):  CONCLUSIONS     1 - Normal left " ventricular systolic function (EF 55-60%).   Past Medical History:   Diagnosis Date    Asthma     Brain tumor, pineal gland     Depression     Endometriosis     Hypertension     Precocious puberty     Seizures     Syncope, cardiogenic     Thyroid disease      Past Surgical History:   Procedure Laterality Date     SECTION      COLONOSCOPY W/ BIOPSIES      ESOPHAGOGASTRODUODENOSCOPY      implantable cardiac monitor placement  2018    TONSILLECTOMY      TYMPANOSTOMY TUBE PLACEMENT       Current Outpatient Medications   Medication Sig    epinephrine (EPIPEN INJ) Inject as directed.    PROVENTIL HFA 90 mcg/actuation inhaler INL 2 PFS PO Q 4 H PRF COUGH OR WHZ    acetaminophen (TYLENOL) 500 MG tablet Take 2 tablets (1,000 mg total) by mouth every 6 (six) hours as needed for Pain (As needed for pain and fever). (Patient not taking: Reported on 2019)    betaxolol (KERLONE) 10 mg Tab Take 1/2 tablet (5 mg total) once daily.    ciprofloxacin HCl (CIPRO) 500 MG tablet     dexamethasone (DECADRON) 0.75 MG Tab     gentamicin (GARAMYCIN) 0.3 % ophthalmic solution INT 2 GTS AEA BID    HYDROcodone-acetaminophen (NORCO) 5-325 mg per tablet Take 1 tablet by mouth every 6 (six) hours as needed for Pain. (Patient not taking: Reported on 2019)    metoprolol succinate (TOPROL-XL) 25 MG 24 hr tablet Take 1 tablet (25 mg total) by mouth once daily.    promethazine (PHENERGAN) 25 MG suppository Place 1 suppository (25 mg total) rectally every 4 (four) hours as needed for Nausea. (Patient not taking: Reported on 2019)    XULANE 150-35 mcg/24 hr LISA 1 PA EXT TO THE SKIN WEEKLY FOR 21 DAYS     No current facility-administered medications for this visit.      Review of Systems   Constitution: Negative for chills, fever and malaise/fatigue.   HENT: Negative for congestion and nosebleeds.    Eyes: Negative for blurred vision.   Cardiovascular: Positive for palpitations and syncope. Negative  "for chest pain, dyspnea on exertion, irregular heartbeat, leg swelling, near-syncope, orthopnea and paroxysmal nocturnal dyspnea.   Respiratory: Negative for cough, hemoptysis, shortness of breath, sleep disturbances due to breathing and wheezing.    Endocrine: Negative for polyphagia.   Hematologic/Lymphatic: Negative for bleeding problem. Does not bruise/bleed easily.   Skin: Negative for itching and rash.   Musculoskeletal: Negative for back pain, joint pain, muscle cramps and muscle weakness.   Gastrointestinal: Negative for bloating, abdominal pain and hematemesis.   Genitourinary: Negative for hematuria.   Neurological: Negative for dizziness, focal weakness, headaches, light-headedness, loss of balance, numbness, paresthesias and weakness.   Psychiatric/Behavioral: Negative for altered mental status. The patient is not nervous/anxious.      Objective:     /80   Pulse 108   Ht 5' 1" (1.549 m)   Wt 115.3 kg (254 lb 3.1 oz)   BMI 48.03 kg/m²     Physical Exam   Constitutional: She is oriented to person, place, and time. She appears well-developed and well-nourished. She does not appear ill. No distress.   HENT:   Head: Normocephalic and atraumatic.   Eyes: Pupils are equal, round, and reactive to light. Conjunctivae, EOM and lids are normal.   Neck: Normal range of motion. Neck supple.   Cardiovascular: Regular rhythm, S1 normal, S2 normal, normal heart sounds, intact distal pulses and normal pulses.  No extrasystoles are present. Tachycardia present. PMI is not displaced. Exam reveals no gallop and no friction rub.   No murmur heard.  Pulses:       Radial pulses are 2+ on the right side, and 2+ on the left side.   Pulmonary/Chest: Effort normal and breath sounds normal. No accessory muscle usage. No respiratory distress. She has no decreased breath sounds. She has no wheezes. She has no rhonchi. She has no rales. She exhibits no tenderness.   Abdominal: Soft. Bowel sounds are normal. She exhibits no " "distension. There is no tenderness. There is no guarding.   Musculoskeletal: Normal range of motion. She exhibits no edema.   Neurological: She is alert and oriented to person, place, and time. She has normal strength. She is not disoriented. No sensory deficit. Coordination and gait normal.   Skin: Skin is warm and dry. No bruising noted. She is not diaphoretic. No erythema.   Psychiatric: She has a normal mood and affect. Her speech is normal and behavior is normal. Judgment and thought content normal.   Nursing note and vitals reviewed.    Lab Results   Component Value Date     07/21/2019    K 3.8 07/21/2019    MG 2.0 11/20/2018    BUN 8 07/21/2019    CREATININE 0.6 07/21/2019    ALT 55 (H) 07/06/2019    AST 43 (H) 07/06/2019    HGB 12.6 07/21/2019    HCT 39.8 07/21/2019    TSH 1.591 02/09/2018    TSH 1.591 02/09/2018    LDLCALC 96.0 05/11/2016     Recent Labs   Lab 11/20/18 2006 07/21/19  2143   INR 0.9 0.9       Assessment:     1. Palpitations    2. Syncope, unspecified syncope type    3. Vasovagal syncope    4. Hypothyroidism, unspecified type    5. History of loop recorder    6. Overweight    7. PTSD (post-traumatic stress disorder)    8. Seizure-like activity      Plan:   In summary, Ms. Hunter is a 19 y.o. female with a history of palpitations, syncope, seizure-like activity, pineal gland tumor, headaches. She presents today with c/o of palpitations. She has associated chest discomfort with her palpitations consistent with her previous history. She denies overt chest pain with exertion or at rest, SOB, HASSAN, dizziness, and light headedness. She has had a few syncopal episodes over the past several months. She has no prodrome. She stated during her last syncopal episode she had a seizure post syncope. There are no correlating events on her ILR from these episodes. ILR interrogation today shows EGMs c/w sinus tachycardia with PACs (max 36 min). ILR detected "pauses" these were c/w undersensing, no " pauses noted on EGMs. At last visit patient was started on metoprolol  25 mg daily for inappropriate sinus tachycardia/palpitations. She stated she feels the metoprolol made her feel depressed. She denies any other side effects from the metoprolol, although it is noted under her allergies as making her SOB. She denies SOB with metoprolol and would like to try a different BB for her ST. We will start her on Betaxolol 5 mg daily. LFTs WNL Kidney function is stable, with a creatinine of 0.7 on 7/21/19.  She has still not followed up with neurology for pineal gland tumor (never resected) and syncope/seizure activity due to insurance issues. Her insurance provider recently changed, recommended she follow up with neurology-referral made.   If symptoms are not resolved after neuro eval consider referral to endocrinology for pheochromocytoma work up regarding inappropriate ST.    EKG today, which shows sinus tachycardia at 108 bpm. MD interval is 138. QRS 78. QTc is 471.    Betaxolol 5 mg daily.  Neurology referral.  Follow up with EP in about 6 months (around 5/8/2020), sooner as needed.    *Patient's case discussed with Dr. Hadley and a copy of this note has been sent*  ------------------------------------------------------------------    DONTA Nichole, DIVYA-ROXY  [facility]

## 2019-11-22 RX ORDER — BETAXOLOL 10 MG/1
TABLET, FILM COATED ORAL
Qty: 30 TABLET | Refills: 3 | Status: CANCELLED | OUTPATIENT
Start: 2019-11-22

## 2019-11-22 NOTE — TELEPHONE ENCOUNTER
Spoke w/ pt & informed her that I sent the rx to [facility], NP to sign & all she needs to do is sign it & it will go straight to  Pharmacy. more informed her that it contradicts w/ metoprolol, but pt stated she is no longer taking it. Informed her that I will remove from med list & to check w/ pharmacy by the end of the day or in the morning.   ----- Message from Veronica Ayala sent at 11/22/2019 12:59 PM CST -----  Contact: Pt/[phone]/Pt  Patient requesting to speak with someone in the office,    Patient states that a Prescription was called in about two weeks ago and Pharmacy does'nt have it    Please call    Phone [phone]

## 2019-11-25 RX ORDER — BETAXOLOL 10 MG/1
TABLET, FILM COATED ORAL
Qty: 30 TABLET | Refills: 3 | Status: SHIPPED | OUTPATIENT
Start: 2019-11-25 | End: 2021-02-26

## 2019-12-03 ENCOUNTER — OFFICE VISIT (OUTPATIENT)
Dept: URGENT CARE | Facility: CLINIC | Age: 19
End: 2019-12-03
Payer: MEDICAID

## 2019-12-03 VITALS
BODY MASS INDEX: 47.95 KG/M2 | DIASTOLIC BLOOD PRESSURE: 79 MMHG | SYSTOLIC BLOOD PRESSURE: 129 MMHG | TEMPERATURE: 97 F | OXYGEN SATURATION: 100 % | WEIGHT: 254 LBS | RESPIRATION RATE: 18 BRPM | HEART RATE: 97 BPM | HEIGHT: 61 IN

## 2019-12-03 DIAGNOSIS — L50.9 URTICARIA: Primary | ICD-10-CM

## 2019-12-03 PROCEDURE — 99203 OFFICE O/P NEW LOW 30 MIN: CPT | Mod: S$GLB,,, | Performed by: PHYSICIAN ASSISTANT

## 2019-12-03 PROCEDURE — 99203 PR OFFICE/OUTPT VISIT, NEW, LEVL III, 30-44 MIN: ICD-10-PCS | Mod: S$GLB,,, | Performed by: PHYSICIAN ASSISTANT

## 2019-12-03 RX ORDER — DEXAMETHASONE SODIUM PHOSPHATE 100 MG/10ML
8 INJECTION INTRAMUSCULAR; INTRAVENOUS
Status: COMPLETED | OUTPATIENT
Start: 2019-12-03 | End: 2019-12-03

## 2019-12-03 RX ORDER — HYDROXYZINE HYDROCHLORIDE 25 MG/1
25 TABLET, FILM COATED ORAL 3 TIMES DAILY PRN
Qty: 30 TABLET | Refills: 0 | Status: SHIPPED | OUTPATIENT
Start: 2019-12-03 | End: 2023-12-15

## 2019-12-03 RX ORDER — TRIAMCINOLONE ACETONIDE 1 MG/G
OINTMENT TOPICAL 2 TIMES DAILY
Qty: 30 G | Refills: 0 | Status: SHIPPED | OUTPATIENT
Start: 2019-12-03 | End: 2021-02-26

## 2019-12-03 RX ADMIN — DEXAMETHASONE SODIUM PHOSPHATE 8 MG: 100 INJECTION INTRAMUSCULAR; INTRAVENOUS at 07:12

## 2019-12-04 PROCEDURE — 93010 EKG 12-LEAD: ICD-10-PCS | Mod: ,,, | Performed by: PEDIATRICS

## 2019-12-04 PROCEDURE — 99284 PR EMERGENCY DEPT VISIT,LEVEL IV: ICD-10-PCS | Mod: ,,, | Performed by: EMERGENCY MEDICINE

## 2019-12-04 PROCEDURE — 96365 THER/PROPH/DIAG IV INF INIT: CPT

## 2019-12-04 PROCEDURE — 93010 ELECTROCARDIOGRAM REPORT: CPT | Mod: ,,, | Performed by: PEDIATRICS

## 2019-12-04 PROCEDURE — 99284 EMERGENCY DEPT VISIT MOD MDM: CPT | Mod: 25

## 2019-12-04 PROCEDURE — 99284 EMERGENCY DEPT VISIT MOD MDM: CPT | Mod: ,,, | Performed by: EMERGENCY MEDICINE

## 2019-12-04 PROCEDURE — 93005 ELECTROCARDIOGRAM TRACING: CPT

## 2019-12-04 PROCEDURE — 96361 HYDRATE IV INFUSION ADD-ON: CPT

## 2019-12-04 NOTE — PATIENT INSTRUCTIONS
Hives (Adult)  Hives are pink or red bumps on the skin. These bumps are also known as wheals. The bumps can itch, burn, or sting. Hives can occur anywhere on the body. They vary in size and shape and can form in clusters. Individual hives can appear and go away quickly. New hives may develop as old ones fade. Hives are common and usually harmless. Occasionally hives are a sign of a serious allergy.  Hives are often caused by an allergic reaction. It may be an allergic reaction to foods such as fruit, shellfish, chocolate, nuts, or tomatoes. It may be a reaction to pollens, animal fur, or mold spores. Medicines, chemicals, and insect bites can also cause hives. And hives can be caused by hot sun or cold air. The cause of hives can be difficult to find.  You may be given medicines to relieve swelling and itching. Follow all instructions when using these medicines. The hives will usually fade in a few days, but can last up to 2 weeks.  Home care  Follow these tips:  · Try to find the cause of the hives and eliminate it. Discuss possible causes with your healthcare provider. Future reactions to the same allergen may be worse.  · Dont scratch the hives. Scratching will delay healing. To reduce itching, apply cool, wet compresses to the skin.  · Dress in soft, loose cotton clothing.  · Dont bathe in hot water. This can make the itching worse.  · Apply an ice pack or cool pack wrapped in a thin towel to your skin. This will help reduce redness and itching. But if your hives were caused by exposure to cold, then do not apply more cold to them.  · You may use over-the counter antihistamines to reduce itching. Some older antihistamines, such as diphenhydramine and chlorpheniramine, are inexpensive. But they need to be taken often and may make you sleepy. They are best used at bedtime. Dont use diphenhydramine if you have glaucoma or have trouble urinating because of an enlarged prostate. Newer antihistamines, such as  loratadine, cetirizine, and fexofenadine, are generally more expensive. But they tend to have fewer side effects, such as drowsiness. They can be taken less often.  · Another type of antihistamine is used to treat heartburn. This type includes ranitidine, nizatidine, famotidine, and cimetidine. These are sometimes used along with the above antihistamines if a single medicine is not working.  Follow-up care  Follow up with your healthcare provider if your symptoms don't get better in 2 days. Ask your provider about allergy testing if you have had a severe reaction, or have had several episodes of hives. He or she can use the allergy testing to find out what you are allergic to.  When to seek medical advice  Call your healthcare provider right away if any of these occur:  · Fever of 100.4°F (38.0°C) or higher, or as directed by your healthcare provider  · Redness, swelling, or pain  · Foul-smelling fluid coming from the rash  Call 911  Call 911 if any of the following occur:  · Swelling of the face, throat, or tongue  · Trouble breathing or swallowing  · Dizziness, weakness, or fainting  Date Last Reviewed: 9/1/2016  © 5791-2596 [employer]. [address] [place] [address], [place] [id number]. All rights reserved. This information is not intended as a substitute for professional medical care. Always follow your healthcare professional's instructions.      She develop any difficulty breathing, throat swelling, shortness of breath or chest pain go to the emergency room.  Follow up with her primary care provider should her symptoms persist or worsen.    Please follow up with your Primary care provider within 2-5 days if your signs and symptoms have not resolved or worsen.     If your condition worsens or fails to improve we recommend that you receive another evaluation at the emergency room immediately or contact your primary medical clinic to discuss your concerns.   You must understand that you have received an  Urgent Care treatment only and that you may be released before all of your medical problems are known or treated. You, the patient, will arrange for follow up care as instructed.     RED FLAGS/WARNING SYMPTOMS DISCUSSED WITH PATIENT THAT WOULD WARRANT EMERGENT MEDICAL ATTENTION. PATIENT VERBALIZED UNDERSTANDING.

## 2019-12-04 NOTE — PROGRESS NOTES
"Subjective:       Patient ID: Eliu uHnter is a 19 y.o. female.    Vitals:  height is 5' 1" (1.549 m) and weight is 115.2 kg (254 lb). Her temperature is 97.4 °F (36.3 °C). Her blood pressure is 129/79 and her pulse is 97. Her respiration is 18 and oxygen saturation is 100%.     Chief Complaint: Rash    Patient presents with a rash that is present on her lower back and extends on to her bilateral gluteus.  The rash is also present on her left forearm.  Denies any contacts that she is aware of no new medications or fragrances.  The rash is spreading and is very itchy in nature.  Denies any crusting or oozing.    Rash   This is a new problem. Episode onset: 2 days. The problem has been gradually worsening since onset. The rash is diffuse. It is unknown if there was an exposure to a precipitant. Pertinent negatives include no cough, fever or sore throat. Past treatments include nothing. The treatment provided no relief.       Constitution: Negative for chills and fever.   HENT: Negative for facial swelling and sore throat.    Neck: Negative for painful lymph nodes.   Eyes: Negative for eye itching and eyelid swelling.   Respiratory: Negative for cough.    Musculoskeletal: Positive for pain. Negative for joint pain and joint swelling.   Skin: Positive for rash. Negative for color change, pale, wound, abrasion, laceration, lesion, skin thickening/induration, puncture wound, erythema, bruising, abscess and avulsion.   Allergic/Immunologic: Positive for itching. Negative for environmental allergies and immunocompromised state.   Hematologic/Lymphatic: Negative for swollen lymph nodes.       Objective:      Physical Exam   Constitutional: She is oriented to person, place, and time. She appears well-developed and well-nourished. She is cooperative.  Non-toxic appearance. She does not have a sickly appearance. She does not appear ill. No distress.   HENT:   Head: Normocephalic and atraumatic. Head is without abrasion, " without contusion and without laceration.   Right Ear: Hearing, tympanic membrane, external ear and ear canal normal.   Left Ear: Hearing, tympanic membrane, external ear and ear canal normal.   Nose: Nose normal. No mucosal edema, rhinorrhea or nasal deformity. No epistaxis. Right sinus exhibits no maxillary sinus tenderness and no frontal sinus tenderness. Left sinus exhibits no maxillary sinus tenderness and no frontal sinus tenderness.   Mouth/Throat: Uvula is midline, oropharynx is clear and moist and mucous membranes are normal. No trismus in the jaw. Normal dentition. No uvula swelling. No oropharyngeal exudate, posterior oropharyngeal edema or posterior oropharyngeal erythema.   Eyes: Pupils are equal, round, and reactive to light. Conjunctivae, EOM and lids are normal. No scleral icterus.   Neck: Trachea normal, full passive range of motion without pain and phonation normal. Neck supple. No neck rigidity. No edema and no erythema present.   Cardiovascular: Normal rate, regular rhythm, normal heart sounds, intact distal pulses and normal pulses. Exam reveals no gallop and no friction rub.   No murmur heard.  Pulmonary/Chest: Effort normal and breath sounds normal. No respiratory distress. She has no decreased breath sounds. She has no wheezes. She has no rhonchi. She has no rales.   Abdominal: Normal appearance.   Musculoskeletal: Normal range of motion. She exhibits no edema or deformity.   Lymphadenopathy:     She has no cervical adenopathy.   Neurological: She is alert and oriented to person, place, and time. She exhibits normal muscle tone. Coordination normal.   Skin: Skin is warm, dry, intact, not diaphoretic, not pale, rash, urticarial, not nodular, not pustular, not purpuric, macular, not vesicular, not papular, not maculopapular and no abscessed. Capillary refill takes less than 2 seconds.   Urticarial macular rash located on left forearm as well as low back and bilateral gluteus.  Id erythematous  with small pinpoint petechiae noted centrally. abrasion, burn, bruising, erythema and ecchymosis  Psychiatric: She has a normal mood and affect. Her speech is normal and behavior is normal. Judgment and thought content normal. Cognition and memory are normal.   Nursing note and vitals reviewed.            Assessment:       1. Urticaria        Plan:     patient given dexamethasone injection at time of visit in tolerated well.  Patient also prescribed triamcinolone cream as well as hydroxyzine to aid with itch.  Discussed that hydroxyzine can make her drowsy and it is best taken at bedtime.  Patient has multiple drug allergies but states that she has received steroid injections as well as taking oral steroids in tolerated well.  She is also allergic to meclizine which is an antihistamine in the same family is hydroxyzine.  Patient states she has taken Benadryl without any issues.  Discussed that should she develop any angioedema, pharyngeal swelling, difficulty breathing, shortness of breath or chest pain she should go to the emergency room immediately.  Should symptoms not resolve within the next couple days to week she should follow up with primary care provider.    Urticaria  -     dexamethasone injection 8 mg  -     triamcinolone acetonide 0.1% (KENALOG) 0.1 % ointment; Apply topically 2 (two) times daily.  Dispense: 30 g; Refill: 0  -     hydrOXYzine HCl (ATARAX) 25 MG tablet; Take 1 tablet (25 mg total) by mouth 3 (three) times daily as needed.  Dispense: 30 tablet; Refill: 0      Patient Instructions     Hives (Adult)  Hives are pink or red bumps on the skin. These bumps are also known as wheals. The bumps can itch, burn, or sting. Hives can occur anywhere on the body. They vary in size and shape and can form in clusters. Individual hives can appear and go away quickly. New hives may develop as old ones fade. Hives are common and usually harmless. Occasionally hives are a sign of a serious allergy.  Hives are  often caused by an allergic reaction. It may be an allergic reaction to foods such as fruit, shellfish, chocolate, nuts, or tomatoes. It may be a reaction to pollens, animal fur, or mold spores. Medicines, chemicals, and insect bites can also cause hives. And hives can be caused by hot sun or cold air. The cause of hives can be difficult to find.  You may be given medicines to relieve swelling and itching. Follow all instructions when using these medicines. The hives will usually fade in a few days, but can last up to 2 weeks.  Home care  Follow these tips:  · Try to find the cause of the hives and eliminate it. Discuss possible causes with your healthcare provider. Future reactions to the same allergen may be worse.  · Dont scratch the hives. Scratching will delay healing. To reduce itching, apply cool, wet compresses to the skin.  · Dress in soft, loose cotton clothing.  · Dont bathe in hot water. This can make the itching worse.  · Apply an ice pack or cool pack wrapped in a thin towel to your skin. This will help reduce redness and itching. But if your hives were caused by exposure to cold, then do not apply more cold to them.  · You may use over-the counter antihistamines to reduce itching. Some older antihistamines, such as diphenhydramine and chlorpheniramine, are inexpensive. But they need to be taken often and may make you sleepy. They are best used at bedtime. Dont use diphenhydramine if you have glaucoma or have trouble urinating because of an enlarged prostate. Newer antihistamines, such as loratadine, cetirizine, and fexofenadine, are generally more expensive. But they tend to have fewer side effects, such as drowsiness. They can be taken less often.  · Another type of antihistamine is used to treat heartburn. This type includes ranitidine, nizatidine, famotidine, and cimetidine. These are sometimes used along with the above antihistamines if a single medicine is not working.  Follow-up care  Follow  up with your healthcare provider if your symptoms don't get better in 2 days. Ask your provider about allergy testing if you have had a severe reaction, or have had several episodes of hives. He or she can use the allergy testing to find out what you are allergic to.  When to seek medical advice  Call your healthcare provider right away if any of these occur:  · Fever of 100.4°F (38.0°C) or higher, or as directed by your healthcare provider  · Redness, swelling, or pain  · Foul-smelling fluid coming from the rash  Call 911  Call 911 if any of the following occur:  · Swelling of the face, throat, or tongue  · Trouble breathing or swallowing  · Dizziness, weakness, or fainting  Date Last Reviewed: 9/1/2016 © 2000-2017 [employer]. [address] [place] [address], [place] [address]. All rights reserved. This information is not intended as a substitute for professional medical care. Always follow your healthcare professional's instructions.      She develop any difficulty breathing, throat swelling, shortness of breath or chest pain go to the emergency room.  Follow up with her primary care provider should her symptoms persist or worsen.    Please follow up with your Primary care provider within 2-5 days if your signs and symptoms have not resolved or worsen.     If your condition worsens or fails to improve we recommend that you receive another evaluation at the emergency room immediately or contact your primary medical clinic to discuss your concerns.   You must understand that you have received an Urgent Care treatment only and that you may be released before all of your medical problems are known or treated. You, the patient, will arrange for follow up care as instructed.     RED FLAGS/WARNING SYMPTOMS DISCUSSED WITH PATIENT THAT WOULD WARRANT EMERGENT MEDICAL ATTENTION. PATIENT VERBALIZED UNDERSTANDING.

## 2019-12-05 ENCOUNTER — HOSPITAL ENCOUNTER (EMERGENCY)
Facility: HOSPITAL | Age: 19
Discharge: HOME | End: 2019-12-05
Attending: EMERGENCY MEDICINE
Payer: MEDICAID

## 2019-12-05 VITALS
RESPIRATION RATE: 19 BRPM | SYSTOLIC BLOOD PRESSURE: 119 MMHG | OXYGEN SATURATION: 98 % | WEIGHT: 245 LBS | TEMPERATURE: 98 F | DIASTOLIC BLOOD PRESSURE: 74 MMHG | HEIGHT: 61 IN | BODY MASS INDEX: 46.26 KG/M2 | HEART RATE: 83 BPM

## 2019-12-05 DIAGNOSIS — R07.9 CHEST PAIN: Primary | ICD-10-CM

## 2019-12-05 LAB
ALBUMIN SERPL BCP-MCNC: 3.4 G/DL (ref 3.5–5.2)
ALP SERPL-CCNC: 64 U/L (ref 55–135)
ALT SERPL W/O P-5'-P-CCNC: 22 U/L (ref 10–44)
ANION GAP SERPL CALC-SCNC: 10 MMOL/L (ref 8–16)
AST SERPL-CCNC: 13 U/L (ref 10–40)
BASOPHILS # BLD AUTO: 0.03 K/UL (ref 0–0.2)
BASOPHILS NFR BLD: 0.2 % (ref 0–1.9)
BILIRUB SERPL-MCNC: 0.3 MG/DL (ref 0.1–1)
BILIRUB UR QL STRIP: NEGATIVE
BNP SERPL-MCNC: 21 PG/ML (ref 0–99)
BUN SERPL-MCNC: 9 MG/DL (ref 6–20)
CALCIUM SERPL-MCNC: 9.1 MG/DL (ref 8.7–10.5)
CHLORIDE SERPL-SCNC: 109 MMOL/L (ref 95–110)
CLARITY UR REFRACT.AUTO: ABNORMAL
CO2 SERPL-SCNC: 21 MMOL/L (ref 23–29)
COLOR UR AUTO: YELLOW
CREAT SERPL-MCNC: 0.7 MG/DL (ref 0.5–1.4)
DIFFERENTIAL METHOD: ABNORMAL
EOSINOPHIL # BLD AUTO: 0 K/UL (ref 0–0.5)
EOSINOPHIL NFR BLD: 0.1 % (ref 0–8)
ERYTHROCYTE [DISTWIDTH] IN BLOOD BY AUTOMATED COUNT: 15.5 % (ref 11.5–14.5)
EST. GFR  (AFRICAN AMERICAN): >60 ML/MIN/1.73 M^2
EST. GFR  (NON AFRICAN AMERICAN): >60 ML/MIN/1.73 M^2
GLUCOSE SERPL-MCNC: 97 MG/DL (ref 70–110)
GLUCOSE UR QL STRIP: NEGATIVE
HCT VFR BLD AUTO: 37.8 % (ref 37–48.5)
HGB BLD-MCNC: 11.7 G/DL (ref 12–16)
HGB UR QL STRIP: NEGATIVE
IMM GRANULOCYTES # BLD AUTO: 0.05 K/UL (ref 0–0.04)
IMM GRANULOCYTES NFR BLD AUTO: 0.4 % (ref 0–0.5)
KETONES UR QL STRIP: NEGATIVE
LEUKOCYTE ESTERASE UR QL STRIP: NEGATIVE
LYMPHOCYTES # BLD AUTO: 3.5 K/UL (ref 1–4.8)
LYMPHOCYTES NFR BLD: 27.2 % (ref 18–48)
MCH RBC QN AUTO: 25.4 PG (ref 27–31)
MCHC RBC AUTO-ENTMCNC: 31 G/DL (ref 32–36)
MCV RBC AUTO: 82 FL (ref 82–98)
MONOCYTES # BLD AUTO: 0.8 K/UL (ref 0.3–1)
MONOCYTES NFR BLD: 6 % (ref 4–15)
NEUTROPHILS # BLD AUTO: 8.6 K/UL (ref 1.8–7.7)
NEUTROPHILS NFR BLD: 66.1 % (ref 38–73)
NITRITE UR QL STRIP: NEGATIVE
NRBC BLD-RTO: 0 /100 WBC
PH UR STRIP: 6 [PH] (ref 5–8)
PLATELET # BLD AUTO: 291 K/UL (ref 150–350)
PMV BLD AUTO: 12.5 FL (ref 9.2–12.9)
POTASSIUM SERPL-SCNC: 3.6 MMOL/L (ref 3.5–5.1)
PROT SERPL-MCNC: 7.1 G/DL (ref 6–8.4)
PROT UR QL STRIP: NEGATIVE
RBC # BLD AUTO: 4.61 M/UL (ref 4–5.4)
SODIUM SERPL-SCNC: 140 MMOL/L (ref 136–145)
SP GR UR STRIP: 1.03 (ref 1–1.03)
TROPONIN I SERPL DL<=0.01 NG/ML-MCNC: 0.02 NG/ML (ref 0–0.03)
TROPONIN I SERPL DL<=0.01 NG/ML-MCNC: <0.006 NG/ML (ref 0–0.03)
URN SPEC COLLECT METH UR: ABNORMAL
WBC # BLD AUTO: 13.01 K/UL (ref 3.9–12.7)

## 2019-12-05 PROCEDURE — 80053 COMPREHEN METABOLIC PANEL: CPT

## 2019-12-05 PROCEDURE — 83880 ASSAY OF NATRIURETIC PEPTIDE: CPT

## 2019-12-05 PROCEDURE — 93010 ELECTROCARDIOGRAM REPORT: CPT | Mod: ,,, | Performed by: INTERNAL MEDICINE

## 2019-12-05 PROCEDURE — 84484 ASSAY OF TROPONIN QUANT: CPT

## 2019-12-05 PROCEDURE — 93010 EKG 12-LEAD: ICD-10-PCS | Mod: ,,, | Performed by: INTERNAL MEDICINE

## 2019-12-05 PROCEDURE — 93005 ELECTROCARDIOGRAM TRACING: CPT

## 2019-12-05 PROCEDURE — 63600175 PHARM REV CODE 636 W HCPCS: Performed by: STUDENT IN AN ORGANIZED HEALTH CARE EDUCATION/TRAINING PROGRAM

## 2019-12-05 PROCEDURE — 25000003 PHARM REV CODE 250: Performed by: STUDENT IN AN ORGANIZED HEALTH CARE EDUCATION/TRAINING PROGRAM

## 2019-12-05 PROCEDURE — 85025 COMPLETE CBC W/AUTO DIFF WBC: CPT

## 2019-12-05 PROCEDURE — 81003 URINALYSIS AUTO W/O SCOPE: CPT

## 2019-12-05 RX ORDER — ACETAMINOPHEN 500 MG
1000 TABLET ORAL
Status: COMPLETED | OUTPATIENT
Start: 2019-12-05 | End: 2019-12-05

## 2019-12-05 RX ORDER — METHOCARBAMOL 500 MG/1
1000 TABLET, FILM COATED ORAL
Status: COMPLETED | OUTPATIENT
Start: 2019-12-05 | End: 2019-12-05

## 2019-12-05 RX ORDER — METHOCARBAMOL 500 MG/1
1000 TABLET, FILM COATED ORAL 3 TIMES DAILY
Qty: 30 TABLET | Refills: 0 | Status: SHIPPED | OUTPATIENT
Start: 2019-12-05 | End: 2019-12-10

## 2019-12-05 RX ADMIN — METHOCARBAMOL TABLETS 1000 MG: 500 TABLET, COATED ORAL at 02:12

## 2019-12-05 RX ADMIN — PROMETHAZINE HYDROCHLORIDE 25 MG: 25 INJECTION INTRAMUSCULAR; INTRAVENOUS at 01:12

## 2019-12-05 RX ADMIN — SODIUM CHLORIDE 1000 ML: 0.9 INJECTION, SOLUTION INTRAVENOUS at 01:12

## 2019-12-05 RX ADMIN — ACETAMINOPHEN 1000 MG: 500 TABLET ORAL at 02:12

## 2019-12-05 NOTE — ED PROVIDER NOTES
Encounter Date: 12/4/2019       History     Chief Complaint   Patient presents with    Chest Pain     Pt c/o generalized rash to body starting 4 days ago. She was seen at urgent care yesterday and given steroid cream, injection and benadryl. She states she had to give herself an epi pen yesterday bc she felt like her throat was closing. She states 1 hour ago, she started feeling SOB with chest tightness 6/10 radiating to left arm and left finger numbness. She states she has a cardiac implant in place and hx of vasovagal neurocardiogenic syncope. She reports passing out twice today. Unsure of head injury    Multiple Complaints     She also reports emesis and diarrhea x 3 days.      HPI     Eliu Hunter is a 19 y.o. 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  Reports chest pain is left-sided, sharp, radiating to her left arm and make her fingers numb.  Has had multiple episodes of nausea and vomiting, and multiple episodes of diarrhea today, both nonbloody.  Also reports that she passed out twice today.  No fever, chills, URI symptoms, abdominal pain, urinary symptoms. Last menstrual period was 2-3 weeks ago.  She does not think that she is pregnant.  Patient has been diagnosed with this neurocardiogenic syncope for 3 years, but just recently been put on metoprolol 25 mg which she has not taken due to having an ongoing rash in her lower back that she wants it to be resolved before taking new medications.      Review of patient's allergies indicates:   Allergen Reactions    Advair diskus [fluticasone propion-salmeterol] Anaphylaxis    Aspartame Anaphylaxis     Other reaction(s): UNKNOWN    Bupropion Anaphylaxis    Chloral hydrate analogues Other  (See Comments)     Adverse reaction per grandma    Imitrex [sumatriptan succinate] Anaphylaxis    Ugo Anaphylaxis    Metoprolol Shortness Of Breath    Nitroglycerin Anaphylaxis     Other reaction(s): stop breathing    Quetiapine Hallucinations     Other reaction(s): HALLUCINATION    Reglan [metoclopramide hcl] Shortness Of Breath    Tomato (solanum lycopersicum) Anaphylaxis and Shortness Of Breath    Unable to assess Anaphylaxis     Crabs/not allergic to iodine    Zantac [ranitidine hcl] Shortness Of Breath    Hibiclens (isopropyl alcohol) Itching    Abilify [aripiprazole]     Ambien [zolpidem]     Banana      Other reaction(s): STOP BREATHING    Chloral hydrate      Other reaction(s): adverse affect    Compazine [prochlorperazine edisylate]     Crab      Other reaction(s): STOP BREATHING    Desmopressin     Eggplant      Other reaction(s): EGG PLANT    Honey      Other reaction(s): LIP SWELLING    Ketorolac      Other reaction(s): RASH    Kiwi (actinidia chinensis)     Lexapro [escitalopram oxalate]     Meclizine      hives    Metoclopramide      Other reaction(s): SHORTNESS OF BREATH    Ondansetron      Other reaction(s): RASH    Prochlorperazine      Other reaction(s): RASH    Remeron [mirtazapine]     Sucralose      Other reaction(s): STOPS BREATHING    Tomato      Other reaction(s): stop breathing    Xanax [alprazolam]     Zofran [ondansetron hcl (pf)]     Mayonnaise Rash     Other reaction(s): STOP BREATHING    Sulfa (sulfonamide antibiotics) Rash and Nausea And Vomiting     Past Medical History:   Diagnosis Date    Asthma     Brain tumor, pineal gland     Depression     Endometriosis     Hypertension     Precocious puberty     Seizures     Syncope, cardiogenic     Thyroid disease      Past Surgical History:   Procedure Laterality Date     SECTION      COLONOSCOPY W/ BIOPSIES      ESOPHAGOGASTRODUODENOSCOPY      implantable cardiac monitor placement   08/02/2018    TONSILLECTOMY      TYMPANOSTOMY TUBE PLACEMENT       Family History   Problem Relation Age of Onset    Heart disease Mother     Hyperlipidemia Mother     Asthma Mother     Crohn's disease Mother 37        pending surgery. prior med; sulfasalazine    Depression Mother     Bipolar disorder Mother     Arthritis Mother     Anxiety disorder Mother     Other Mother         PTSD    Hypertension Maternal Grandmother     Asthma Maternal Grandmother     Depression Maternal Grandmother     Anxiety disorder Maternal Grandmother     Hypertension Paternal Grandmother     Diabetes Paternal Grandmother     Nephrolithiasis Father     Nephrolithiasis Brother     ADD / ADHD Brother     Depression Brother     Bipolar disorder Brother     Crohn's disease Maternal Grandfather     Alcohol abuse Paternal Grandfather     Breast cancer Neg Hx     Colon cancer Neg Hx     Ovarian cancer Neg Hx      Social History     Tobacco Use    Smoking status: Passive Smoke Exposure - Never Smoker    Smokeless tobacco: Never Used   Substance Use Topics    Alcohol use: No    Drug use: No     Review of Systems   Constitutional: Negative for chills, diaphoresis and fever.   HENT: Negative for congestion, rhinorrhea and sore throat.    Eyes: Negative for pain and visual disturbance.   Respiratory: Positive for chest tightness and shortness of breath. Negative for cough.    Cardiovascular: Positive for chest pain. Negative for palpitations and leg swelling.   Gastrointestinal: Positive for abdominal pain (Mild abdominal cramping with diarrhea only), diarrhea, nausea and vomiting. Negative for blood in stool and constipation.   Genitourinary: Negative for dysuria, frequency, hematuria and urgency.   Musculoskeletal: Negative for arthralgias, back pain and myalgias.   Skin: Negative for rash and wound.   Neurological: Positive for syncope. Negative for dizziness, weakness, light-headedness and headaches.    Hematological: Does not bruise/bleed easily.   Psychiatric/Behavioral: Negative for confusion.       Physical Exam     Initial Vitals [12/04/19 2343]   BP Pulse Resp Temp SpO2   (!) 153/101 75 18 98.1 °F (36.7 °C) 98 %      MAP       --         Physical Exam    Nursing note and vitals reviewed.  Constitutional: Vital signs are normal. She appears well-developed and well-nourished. She is not diaphoretic. She is Obese .   HENT:   Head: Normocephalic and atraumatic.   Nose: Nose normal.   Mouth/Throat: Oropharynx is clear and moist.   Eyes: Conjunctivae and EOM are normal. Pupils are equal, round, and reactive to light.   Neck: Normal range of motion. Neck supple.   Cardiovascular: Normal rate, regular rhythm, S1 normal, S2 normal, normal heart sounds, intact distal pulses and normal pulses. Exam reveals no gallop and no friction rub.    No murmur heard.  Pulmonary/Chest: Effort normal and breath sounds normal. No tachypnea. No respiratory distress. She has no decreased breath sounds. She has no wheezes. She has no rhonchi. She has no rales.   Lungs are lungs clear bilaterally without wheezing   Abdominal: Soft. Normal appearance and bowel sounds are normal. She exhibits no distension. There is no tenderness. There is no rigidity, no guarding and no CVA tenderness.   Musculoskeletal: Normal range of motion. She exhibits no edema or tenderness.   Neurological: She is alert and oriented to person, place, and time. She has normal strength. No cranial nerve deficit or sensory deficit.   Skin: Skin is warm, dry and intact.   Small 1 cm erythematous lesions on lower back as well as 2 or 3 on her left arm, without raised border, without excoriation, nonvesicular, no dermatomal distribution.  No other hives or urticaria on body.         ED Course   Procedures  Labs Reviewed   CBC W/ AUTO DIFFERENTIAL - Abnormal; Notable for the following components:       Result Value    WBC 13.01 (*)     Hemoglobin 11.7 (*)     Mean  Corpuscular Hemoglobin 25.4 (*)     Mean Corpuscular Hemoglobin Conc 31.0 (*)     RDW 15.5 (*)     Gran # (ANC) 8.6 (*)     Immature Grans (Abs) 0.05 (*)     All other components within normal limits   COMPREHENSIVE METABOLIC PANEL - Abnormal; Notable for the following components:    CO2 21 (*)     Albumin 3.4 (*)     All other components within normal limits   URINALYSIS, REFLEX TO URINE CULTURE - Abnormal; Notable for the following components:    Appearance, UA Hazy (*)     All other components within normal limits    Narrative:     Preferred Collection Type->Urine, Clean Catch   TROPONIN I   B-TYPE NATRIURETIC PEPTIDE   TROPONIN I          Imaging Results          X-Ray Chest PA And Lateral (Final result)  Result time 12/05/19 00:59:24    Final result by Mo Henley MD (12/05/19 00:59:24)                 Impression:      No radiographic evidence of acute intrathoracic process.      Electronically signed by: Mo Henley MD  Date:    12/05/2019  Time:    00:59             Narrative:    EXAMINATION:  XR CHEST PA AND LATERAL    CLINICAL HISTORY:  Chest pain, unspecified    TECHNIQUE:  PA and lateral views of the chest were performed.    COMPARISON:  CTA chest, chest radiograph 11/20/2018    FINDINGS:  Cardiac monitoring leads overlie the chest.  There is a monitoring loop recorder projecting over the left hemithorax.  Cardiomediastinal silhouette appears within normal limits.  The lungs are symmetrically aerated without evidence of focal airspace consolidation.  There is no pleural effusion or pneumothorax.  Visualized osseous structures appear intact.                                       APC / Resident Notes:       MDM - PGY3  Eliu Hunter is a 19 y.o. female w/ hx of reported neurocardiogenic syncope, seizure, multiple drug allergies, palpitations with an implanted loop recorder that was placed 1 year ago, presented tonight for chest pains and shortness of breath on exertion.  On exam, well  appearing, nontoxic, appears comfortable and not in acute distress. A/VSS. Lungs CTAB. RRR. Abd s/nt/nd.  No pedal edema.  Bedside echo showing good LV function, no wall motion abnormalities, no RV dilation, no pericardial effusion.  Plan to obtain cardiac workup.  No evidence of anaphylaxis, sepsis, DVT on exam.  Do not suspect PE.    On chart review, patient was seen in cardiology clinic November 2019 for palpitation, syncope.  She has had 30 days monitor but no syncope during that period.  She has had TTE done at [facility], with baseline tilt negative, echo showing 55-60% ejection fraction with normal LA.  Holter monitor showed longus RR interval at 1670 milliseconds.  During that visit, her loop recorder (st juani), was interrogated and showed sinus tachycardia with PACs, with several pauses that correlating with under sensing.  Patient was supposed to follow up with Neurology for pineal gland tumor, and syncope/seizure activity but she has not follow-up.    First troponin was negative. EKG normal sinus rhythm, rate 89, no ST elevation or depression, no T-wave inversion, QTC normal.    Plan to obtain 2nd troponin at 3:00 a.m. mirna.  Patient was given Phenergan and IVF.  Will reassess.  --- Hina Patrick MD -- PGY3 EM -- 12/05/2019 2:14 AM ---    UPDATE  2nd trop was negative.  Pt has been tolerating PO well. She was given robaxin and tylenol for pain.  Suspecting this is anxiety vs MSK pain. Syncope likely 2/2 chronic syndrome and being followed by cards. Will discharge w/ robaxin. Advise to follow up with Dr. Hadley, her cardiologist, and also w/ neurology.  --- Hina Patrick MD -- PGY3 EM -- 12/05/2019 4:17 AM ---                           Clinical Impression:       ICD-10-CM ICD-9-CM   1. Chest pain R07.9 786.50                             Hina Patrick MD  Resident  12/05/19 0419

## 2019-12-05 NOTE — ED NOTES
Pt c/o chest pain that radiates to left arm, dizziness, spots in vision, tingling in left finger, N/V, itchy rash mostly on back, and LOC twice today. Pt denies HA, hematemesis, abdominal pain, changes in urination/BM, hitting head with LOC. Pt reports symptoms of chest pain began 5 hours ago and the rash appeared 4 days ago. Pt was seen at an urgent care yesterday and was given steroid cream, an injection, and benadryl with no relief.

## 2020-01-02 PROBLEM — Q74.0: Status: ACTIVE | Noted: 2019-02-12

## 2020-01-16 ENCOUNTER — CLINICAL SUPPORT (OUTPATIENT)
Dept: REHABILITATION | Facility: HOSPITAL | Age: 20
End: 2020-01-16
Payer: MEDICAID

## 2020-01-16 DIAGNOSIS — M62.89 MUSCLE TIGHTNESS: ICD-10-CM

## 2020-01-16 DIAGNOSIS — R10.2 PELVIC PAIN: ICD-10-CM

## 2020-01-16 DIAGNOSIS — M62.81 MUSCLE WEAKNESS: ICD-10-CM

## 2020-01-16 PROCEDURE — 97530 THERAPEUTIC ACTIVITIES: CPT | Mod: PN

## 2020-01-16 PROCEDURE — 97162 PT EVAL MOD COMPLEX 30 MIN: CPT | Mod: PN

## 2020-01-16 NOTE — PATIENT INSTRUCTIONS
Home Exercise Program: 01/16/2020    DIAPHRAGMATIC BREATHING     The diaphragm is a dome shaped muscle that forms the floor of the rib cage. It is the most efficient muscle for breathing and relaxation, although most people are not used to using the diaphragm. Diaphragmatic or belly breathing is an important technique to learn because it helps settle down or relax the autonomic nervous system. The correct use of diaphragmatic breathing can help to quiet brain activity resulting in the relaxation of all the muscles and organs of the body. This is accomplished by slow rhythmic breathing concentrated in the diaphragm muscle rather than the chest.    How to do proper relaxation breathing:     Start by lying on your back or reclining in a chair in a relaxed position. Place one hand on your chest and the other on your abdomen.   Relax your jaw by placing your tongue on the roof of your mouth and keeping your teeth slightly apart.    Take a deep breath in, letting the abdomen expand and rise while you keep your upper chest, neck and shoulders relaxed.    As you breathe out, allow your abdomen and chest to fall. Exhale completely.   It doesn't matter if you breathe in/out through your nose and/or mouth. Do whichever feels comfortable.   Remember to breathe slowly.  Do not force your breathing. Do not hold your breath.   Repeat for 10 minutes every day.        2. After performing 10 minutes of breathing, continue breathing and during out breath you can bulge down as if you are trying to make a bowel movement. Perform this x10 a day     3. [employer] Vaginal dilator - order 2-3 starting with smallest size.
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
## 2020-03-23 PROBLEM — M62.81 MUSCLE WEAKNESS: Status: RESOLVED | Noted: 2020-01-16 | Resolved: 2020-03-23

## 2020-03-23 PROBLEM — R10.2 PELVIC PAIN: Status: RESOLVED | Noted: 2020-01-16 | Resolved: 2020-03-23

## 2020-03-23 PROBLEM — M62.89 MUSCLE TIGHTNESS: Status: RESOLVED | Noted: 2020-01-16 | Resolved: 2020-03-23

## 2020-03-25 ENCOUNTER — DOCUMENTATION ONLY (OUTPATIENT)
Dept: REHABILITATION | Facility: HOSPITAL | Age: 20
End: 2020-03-25

## 2020-03-25 NOTE — PROGRESS NOTES
Outpatient Therapy Discharge Summary     Name: Eliu Hunter  Clinic Number: [id number]    Therapy Diagnosis:   Encounter Diagnoses   Name Primary?    Muscle weakness      Muscle tightness      Pelvic pain        Physician: Terese Adorno     Physician Orders: PT Eval and Treat   Medical Diagnosis: Pelvic Flood Dysfunction  Evaluation Date: 1/16/2020    Date of Last visit: 1/16/2020  Total Visits Received: 1    Assessment    Goals not met due to patient attending only initial evaluation   Short Term Goals: 4 weeks   Pt to be edu pelvic muscle bracing and be able to consistently perform correctly and quickly to help decrease incontinence with cough/laugh/sneeze.  Pt to be able to perform a 5 second kegel x 10 reps to demonstrate improving strength and endurance needed for continence.  Pt to report a decrease in nocturia to no more than 1x/night for improved ability to achieve restorative sleep.    Pt to demonstrate proper diaphragmatic breathing to help with calming the nervous system in order to decrease pain and to improve abdominal wall musculature extensibility.   Pt to report being able to complete a half day at work without significant increase in pain to demonstrate a return towards prior level of function.  Pt to demonstrate good body mechanics when performing ADLs such as lifting and bending to decrease strain to lumbopelvic structures and reduce risk of further injury.  Pt to report minimal pain with palpation of abdominal wall due to improvement in soft tissue mobility from moderate to minimal restrictions.  Pt to report a decrease in pain to no more than 8 at it's worst with ADLs.    Pt will report minimal to no pain with single digit pelvic assessment and display relaxation during this assessment in order to progress to dilator use.  Pt will report successful tampon use with < or = 7/10  pain for improved activity tolerance.       Long Term Goals: 8 weeks   Pt to be able to perform a 8  second kegel x 10 reps to demonstrate improving strength and endurance needed for continence.  Pt to report elimination of incontinence with ADLs to demonstrate improved pelvic floor muscle strength and coordination.  Pt to be discharged with home plan for carry over after discharge.   Pt to report improved restorative sleeping, waking up no more than 2x/night from pain.  Pt to increase abdominal wall strength by at least 1 muscle grade to improve lumbopelvic stability with ADLs that would normally increase pain.  Pt to report a decrease in pain to no more than 5 at it's worst with ADLs.    Pt will be independent with use of dilation in order to progress towards self management and intercourse.  Pt will report successful tampon use with no pain reported to demonstrate a return towards prior level of function.     Discharge reason: Patient has not attended therapy since 1/16/2020    Plan   This patient is discharged from Physical Therapy

## 2020-06-04 DIAGNOSIS — I49.8 OTHER SPECIFIED CARDIAC ARRHYTHMIAS: Primary | ICD-10-CM

## 2020-06-24 ENCOUNTER — TELEPHONE (OUTPATIENT)
Dept: ELECTROPHYSIOLOGY | Facility: CLINIC | Age: 20
End: 2020-06-24

## 2020-06-24 NOTE — TELEPHONE ENCOUNTER
MA reached out to patient to check if she was coming in to appointment and to advise patient to call clinic if she needed assistance with rescheduling .

## 2020-08-14 PROBLEM — R27.9 LACK OF COORDINATION: Status: ACTIVE | Noted: 2020-08-14

## 2020-08-14 PROBLEM — M79.10 MYALGIA: Status: ACTIVE | Noted: 2020-08-14

## 2020-09-08 ENCOUNTER — TELEPHONE (OUTPATIENT)
Dept: CARDIOLOGY | Facility: HOSPITAL | Age: 20
End: 2020-09-08

## 2020-09-08 NOTE — TELEPHONE ENCOUNTER
Called patient to have her send a manual transmission from her remote home monitor. We also need to re-enroll her in [facility] once she sends a transmission as [facility] has taken her out. She has not sent a transmission since November of 2020. No answer, left a voice message for her to return my call. Left my direct number.   ----- Message from Yaa Saavedra MA sent at 9/8/2020  8:52 AM CDT -----  Regarding: FW: Loop recorder  Please advise     Thank you,   Yaa Saavedra MA  [facility]      ----- Message -----  From: Holly Cardoza  Sent: 9/5/2020  10:16 AM CDT  To: [facility] Staff  Subject: Loop recorder                                    Type:  Needs Medical Advice    Who Called: patient  Would the patient rather a call back or a response via [facility]?  Call back  Best Call Back Number:  [phone]  Additional Information:  needs to speak with your office about her loop recorder as she lost the card and has a new phone

## 2020-10-06 DIAGNOSIS — R00.2 PALPITATIONS: Primary | ICD-10-CM

## 2020-10-09 ENCOUNTER — TELEPHONE (OUTPATIENT)
Dept: ELECTROPHYSIOLOGY | Facility: CLINIC | Age: 20
End: 2020-10-09

## 2020-10-09 NOTE — TELEPHONE ENCOUNTER
Attempted to call [facility] and the number left is not in service.      ----- Message from Yaa Saavedra MA sent at 10/9/2020  4:06 PM CDT -----  Can you please give cardiac clearance     Thank you,   Yaa Saavedra MA  [facility]      ----- Message -----  From: Nabila Richmond  Sent: 10/8/2020   3:54 PM CDT  To: Yaa Saavedra MA      ----- Message -----  From: Malina Epps  Sent: 10/8/2020   9:58 AM CDT  To: [facility] Staff    [facility] called to get a clearance for pt to have surgery please fax it to [phone]

## 2020-10-15 ENCOUNTER — TELEPHONE (OUTPATIENT)
Dept: ELECTROPHYSIOLOGY | Facility: CLINIC | Age: 20
End: 2020-10-15

## 2020-10-15 NOTE — TELEPHONE ENCOUNTER
Spoke with pt to inform her of device check prior to appt with Dr Hadley.  Pt was unhappy with the appt time being so early, so I offered a later time with the NP, but she would prefer to keep appt with DM tomorrow morning.  Pt also informed me that she has been unable to connect her ILR to her phone for remote monitoring, so I advised that she come in for us to see the status of the battery life and potentially get some information from it or discuss next steps in the event that it has reached [place].

## 2021-02-22 ENCOUNTER — TELEPHONE (OUTPATIENT)
Dept: ELECTROPHYSIOLOGY | Facility: CLINIC | Age: 21
End: 2021-02-22

## 2021-02-26 ENCOUNTER — HOSPITAL ENCOUNTER (OUTPATIENT)
Dept: CARDIOLOGY | Facility: CLINIC | Age: 21
Discharge: HOME | End: 2021-02-26
Payer: MEDICAID

## 2021-02-26 ENCOUNTER — CLINICAL SUPPORT (OUTPATIENT)
Dept: CARDIOLOGY | Facility: HOSPITAL | Age: 21
End: 2021-02-26
Attending: INTERNAL MEDICINE
Payer: MEDICAID

## 2021-02-26 ENCOUNTER — OFFICE VISIT (OUTPATIENT)
Dept: ELECTROPHYSIOLOGY | Facility: CLINIC | Age: 21
End: 2021-02-26
Payer: MEDICAID

## 2021-02-26 VITALS
HEART RATE: 71 BPM | BODY MASS INDEX: 48.91 KG/M2 | SYSTOLIC BLOOD PRESSURE: 136 MMHG | HEIGHT: 60 IN | WEIGHT: 249.13 LBS | DIASTOLIC BLOOD PRESSURE: 84 MMHG

## 2021-02-26 DIAGNOSIS — R00.2 PALPITATIONS: ICD-10-CM

## 2021-02-26 DIAGNOSIS — Z98.890 HISTORY OF LOOP RECORDER: ICD-10-CM

## 2021-02-26 DIAGNOSIS — I49.8 OTHER SPECIFIED CARDIAC ARRHYTHMIAS: ICD-10-CM

## 2021-02-26 DIAGNOSIS — R00.2 PALPITATIONS: Primary | ICD-10-CM

## 2021-02-26 PROCEDURE — 99215 PR OFFICE/OUTPT VISIT, EST, LEVL V, 40-54 MIN: ICD-10-PCS | Mod: S$PBB,,, | Performed by: INTERNAL MEDICINE

## 2021-02-26 PROCEDURE — 99999 PR PBB SHADOW E&M-EST. PATIENT-LVL III: ICD-10-PCS | Mod: PBBFAC,,, | Performed by: INTERNAL MEDICINE

## 2021-02-26 PROCEDURE — 93005 ELECTROCARDIOGRAM TRACING: CPT | Mod: PBBFAC | Performed by: INTERNAL MEDICINE

## 2021-02-26 PROCEDURE — 99213 OFFICE O/P EST LOW 20 MIN: CPT | Mod: PBBFAC | Performed by: INTERNAL MEDICINE

## 2021-02-26 PROCEDURE — 99215 OFFICE O/P EST HI 40 MIN: CPT | Mod: S$PBB,,, | Performed by: INTERNAL MEDICINE

## 2021-02-26 PROCEDURE — 93010 ELECTROCARDIOGRAM REPORT: CPT | Mod: S$PBB,,, | Performed by: INTERNAL MEDICINE

## 2021-02-26 PROCEDURE — 93010 RHYTHM STRIP: ICD-10-PCS | Mod: S$PBB,,, | Performed by: INTERNAL MEDICINE

## 2021-02-26 PROCEDURE — 99999 PR PBB SHADOW E&M-EST. PATIENT-LVL III: CPT | Mod: PBBFAC,,, | Performed by: INTERNAL MEDICINE

## 2021-04-16 ENCOUNTER — PATIENT MESSAGE (OUTPATIENT)
Dept: RESEARCH | Facility: HOSPITAL | Age: 21
End: 2021-04-16

## 2021-06-04 ENCOUNTER — TELEPHONE (OUTPATIENT)
Dept: ELECTROPHYSIOLOGY | Facility: CLINIC | Age: 21
End: 2021-06-04

## 2021-10-01 ENCOUNTER — OFFICE VISIT (OUTPATIENT)
Dept: OBSTETRICS AND GYNECOLOGY | Facility: CLINIC | Age: 21
End: 2021-10-01
Payer: MEDICAID

## 2021-10-01 ENCOUNTER — LAB VISIT (OUTPATIENT)
Dept: LAB | Facility: HOSPITAL | Age: 21
End: 2021-10-01
Attending: OBSTETRICS & GYNECOLOGY
Payer: MEDICAID

## 2021-10-01 VITALS
HEIGHT: 60 IN | DIASTOLIC BLOOD PRESSURE: 88 MMHG | SYSTOLIC BLOOD PRESSURE: 128 MMHG | BODY MASS INDEX: 50.53 KG/M2 | WEIGHT: 257.38 LBS

## 2021-10-01 DIAGNOSIS — Z12.4 CERVICAL CANCER SCREENING: ICD-10-CM

## 2021-10-01 DIAGNOSIS — Z01.419 WELL WOMAN EXAM WITH ROUTINE GYNECOLOGICAL EXAM: Primary | ICD-10-CM

## 2021-10-01 DIAGNOSIS — N91.5 OLIGOMENORRHEA, UNSPECIFIED TYPE: ICD-10-CM

## 2021-10-01 DIAGNOSIS — Z32.02 NEGATIVE PREGNANCY TEST: ICD-10-CM

## 2021-10-01 DIAGNOSIS — Z11.3 SCREENING EXAMINATION FOR STD (SEXUALLY TRANSMITTED DISEASE): ICD-10-CM

## 2021-10-01 LAB
B-HCG UR QL: NEGATIVE
CTP QC/QA: YES
HCG INTACT+B SERPL-ACNC: <1.2 MIU/ML
TSH SERPL DL<=0.005 MIU/L-ACNC: 1.15 UIU/ML (ref 0.4–4)

## 2021-10-01 PROCEDURE — 99999 PR PBB SHADOW E&M-EST. PATIENT-LVL III: CPT | Mod: PBBFAC,,, | Performed by: OBSTETRICS & GYNECOLOGY

## 2021-10-01 PROCEDURE — 88142 CYTOPATH C/V THIN LAYER: CPT | Performed by: OBSTETRICS & GYNECOLOGY

## 2021-10-01 PROCEDURE — 36415 COLL VENOUS BLD VENIPUNCTURE: CPT | Performed by: OBSTETRICS & GYNECOLOGY

## 2021-10-01 PROCEDURE — 87491 CHLMYD TRACH DNA AMP PROBE: CPT | Performed by: OBSTETRICS & GYNECOLOGY

## 2021-10-01 PROCEDURE — 87591 N.GONORRHOEAE DNA AMP PROB: CPT | Performed by: OBSTETRICS & GYNECOLOGY

## 2021-10-01 PROCEDURE — 81025 URINE PREGNANCY TEST: CPT | Mod: PBBFAC | Performed by: OBSTETRICS & GYNECOLOGY

## 2021-10-01 PROCEDURE — 84443 ASSAY THYROID STIM HORMONE: CPT | Performed by: OBSTETRICS & GYNECOLOGY

## 2021-10-01 PROCEDURE — 99395 PREV VISIT EST AGE 18-39: CPT | Mod: S$PBB,,, | Performed by: OBSTETRICS & GYNECOLOGY

## 2021-10-01 PROCEDURE — 84702 CHORIONIC GONADOTROPIN TEST: CPT | Performed by: OBSTETRICS & GYNECOLOGY

## 2021-10-01 PROCEDURE — 99213 OFFICE O/P EST LOW 20 MIN: CPT | Mod: PBBFAC | Performed by: OBSTETRICS & GYNECOLOGY

## 2021-10-01 PROCEDURE — 99999 PR PBB SHADOW E&M-EST. PATIENT-LVL III: ICD-10-PCS | Mod: PBBFAC,,, | Performed by: OBSTETRICS & GYNECOLOGY

## 2021-10-01 PROCEDURE — 99395 PR PREVENTIVE VISIT,EST,18-39: ICD-10-PCS | Mod: S$PBB,,, | Performed by: OBSTETRICS & GYNECOLOGY

## 2021-10-04 LAB
C TRACH DNA SPEC QL NAA+PROBE: NOT DETECTED
N GONORRHOEA DNA SPEC QL NAA+PROBE: NOT DETECTED

## 2021-10-06 DIAGNOSIS — N91.5 OLIGOMENORRHEA, UNSPECIFIED TYPE: Primary | ICD-10-CM

## 2021-10-06 RX ORDER — NORELGESTROMIN AND ETHINYL ESTRADIOL 150; 35 UG/D; UG/D
PATCH TRANSDERMAL
Qty: 9 PATCH | Refills: 3 | Status: SHIPPED | OUTPATIENT
Start: 2021-10-06

## 2021-10-11 LAB
FINAL PATHOLOGIC DIAGNOSIS: NORMAL
Lab: NORMAL

## 2022-03-31 ENCOUNTER — TELEPHONE (OUTPATIENT)
Dept: OBSTETRICS AND GYNECOLOGY | Facility: CLINIC | Age: 22
End: 2022-03-31
Payer: MEDICAID

## 2022-03-31 NOTE — TELEPHONE ENCOUNTER
Left voicemail, pt schedule for pregnancy confirmation.     ----- Message from Hawa Douglas sent at 3/31/2022 11:11 AM CDT -----  Regarding: self  .Type: Patient Call Back    Who called: self     What is the request in detail: patient states she is two weeks late for period and has taken multiple tests some come back neg and some look faintly pos. She would like to discuss. Please call     Can the clinic reply by [facility]?    Would the patient rather a call back or a response via [facility]? Call     Best call back number: .[phone]

## 2022-04-01 ENCOUNTER — HOSPITAL ENCOUNTER (EMERGENCY)
Facility: HOSPITAL | Age: 22
Discharge: HOME | End: 2022-04-01
Attending: EMERGENCY MEDICINE
Payer: MEDICAID

## 2022-04-01 VITALS
RESPIRATION RATE: 18 BRPM | HEART RATE: 98 BPM | OXYGEN SATURATION: 98 % | TEMPERATURE: 98 F | WEIGHT: 230 LBS | SYSTOLIC BLOOD PRESSURE: 156 MMHG | HEIGHT: 61 IN | BODY MASS INDEX: 43.43 KG/M2 | DIASTOLIC BLOOD PRESSURE: 92 MMHG

## 2022-04-01 DIAGNOSIS — I95.1 ORTHOSTATIC HYPOTENSION: ICD-10-CM

## 2022-04-01 DIAGNOSIS — N92.6 MISSED PERIOD: Primary | ICD-10-CM

## 2022-04-01 DIAGNOSIS — Z32.02 PREGNANCY EXAMINATION OR TEST, NEGATIVE RESULT: ICD-10-CM

## 2022-04-01 DIAGNOSIS — R11.2 NON-INTRACTABLE VOMITING WITH NAUSEA, UNSPECIFIED VOMITING TYPE: ICD-10-CM

## 2022-04-01 LAB
ALBUMIN SERPL-MCNC: 3.4 G/DL (ref 3.3–5.5)
ALBUMIN SERPL-MCNC: 3.6 G/DL (ref 3.3–5.5)
ALP SERPL-CCNC: 75 U/L (ref 42–141)
ALP SERPL-CCNC: 77 U/L (ref 42–141)
B-HCG UR QL: NEGATIVE
BILIRUB SERPL-MCNC: 0.6 MG/DL (ref 0.2–1.6)
BILIRUB SERPL-MCNC: 0.6 MG/DL (ref 0.2–1.6)
BILIRUBIN, POC UA: NEGATIVE
BLOOD, POC UA: ABNORMAL
BUN SERPL-MCNC: 8 MG/DL (ref 7–22)
CALCIUM SERPL-MCNC: 10.2 MG/DL (ref 8–10.3)
CHLORIDE SERPL-SCNC: 104 MMOL/L (ref 98–108)
CLARITY, POC UA: CLEAR
COLOR, POC UA: YELLOW
CREAT SERPL-MCNC: 0.6 MG/DL (ref 0.6–1.2)
CTP QC/QA: YES
GLUCOSE SERPL-MCNC: 116 MG/DL (ref 73–118)
GLUCOSE, POC UA: NEGATIVE
HCT, POC: NORMAL
HGB, POC: NORMAL (ref 14–18)
KETONES, POC UA: NEGATIVE
LEUKOCYTE EST, POC UA: NEGATIVE
MCH, POC: NORMAL
MCHC, POC: NORMAL
MCV, POC: NORMAL
MPV, POC: NORMAL
NITRITE, POC UA: NEGATIVE
PH UR STRIP: 6 [PH]
POC ALT (SGPT): 74 U/L (ref 10–47)
POC ALT (SGPT): 83 U/L (ref 10–47)
POC AMYLASE: 36 U/L (ref 14–97)
POC AST (SGOT): 69 U/L (ref 11–38)
POC AST (SGOT): 69 U/L (ref 11–38)
POC BETA-HCG (QUANT): <5 IU/L
POC GGT: 21 U/L (ref 5–65)
POC PLATELET COUNT: NORMAL
POC TCO2: 28 MMOL/L (ref 18–33)
POTASSIUM BLD-SCNC: 3.8 MMOL/L (ref 3.6–5.1)
PROTEIN, POC UA: NEGATIVE
PROTEIN, POC: 7.7 G/DL (ref 6.4–8.1)
PROTEIN, POC: 7.7 G/DL (ref 6.4–8.1)
RBC, POC: NORMAL
RDW, POC: NORMAL
SAMPLE: NORMAL
SODIUM BLD-SCNC: 144 MMOL/L (ref 128–145)
SPECIFIC GRAVITY, POC UA: >=1.03
UROBILINOGEN, POC UA: 0.2 E.U./DL
WBC, POC: NORMAL

## 2022-04-01 PROCEDURE — 96360 HYDRATION IV INFUSION INIT: CPT | Mod: ER

## 2022-04-01 PROCEDURE — 25000003 PHARM REV CODE 250: Mod: ER | Performed by: EMERGENCY MEDICINE

## 2022-04-01 PROCEDURE — 84702 CHORIONIC GONADOTROPIN TEST: CPT | Mod: ER

## 2022-04-01 PROCEDURE — 80053 COMPREHEN METABOLIC PANEL: CPT | Mod: ER

## 2022-04-01 PROCEDURE — 82150 ASSAY OF AMYLASE: CPT | Mod: ER

## 2022-04-01 PROCEDURE — 99284 EMERGENCY DEPT VISIT MOD MDM: CPT | Mod: 25,ER

## 2022-04-01 PROCEDURE — 81025 URINE PREGNANCY TEST: CPT | Mod: ER | Performed by: EMERGENCY MEDICINE

## 2022-04-01 PROCEDURE — 85025 COMPLETE CBC W/AUTO DIFF WBC: CPT | Mod: ER

## 2022-04-01 RX ORDER — PROMETHAZINE HYDROCHLORIDE 25 MG/1
25 TABLET ORAL EVERY 6 HOURS PRN
Qty: 15 TABLET | Refills: 0 | Status: SHIPPED | OUTPATIENT
Start: 2022-04-01 | End: 2023-12-15

## 2022-04-01 RX ORDER — PROMETHAZINE HYDROCHLORIDE 25 MG/1
25 TABLET ORAL
Status: COMPLETED | OUTPATIENT
Start: 2022-04-01 | End: 2022-04-01

## 2022-04-01 RX ORDER — POLYETHYLENE GLYCOL 3350 17 G/17G
17 POWDER, FOR SOLUTION ORAL DAILY
Qty: 119 G | Refills: 0 | Status: SHIPPED | OUTPATIENT
Start: 2022-04-01

## 2022-04-01 RX ORDER — SYRING-NEEDL,DISP,INSUL,0.3 ML 29 G X1/2"
296 SYRINGE, EMPTY DISPOSABLE MISCELLANEOUS ONCE
Qty: 295 ML | Refills: 0 | Status: SHIPPED | OUTPATIENT
Start: 2022-04-01 | End: 2022-04-01

## 2022-04-01 RX ADMIN — PROMETHAZINE HYDROCHLORIDE 25 MG: 25 TABLET ORAL at 11:04

## 2022-04-01 RX ADMIN — SODIUM CHLORIDE 1000 ML: 0.9 INJECTION, SOLUTION INTRAVENOUS at 10:04

## 2022-04-02 NOTE — ED PROVIDER NOTES
"Encounter Date: 2022    SCRIBE #1 NOTE: I, Tamra Domingo, am scribing for, and in the presence of,  Jory Brennan MD. I have scribed the following portions of the note - Other sections scribed: HPI, ROS, PE.       History     Chief Complaint   Patient presents with    Vomiting    Possible Pregnancy     PT REPORTS VOMITING OFF AND ON FOR 1.5 WEEKS, TIRED ALL THE TIME, LATE ON PERIOD     22 y.o. female, [id number], with Endometriosis, HTN, Crohn's disease, and pineal gland tumor who presents to the ED with chief complaint of persistent nausea and vomiting for 1.5 weeks with difficulty falling asleep and increased sleeping duration when able to sleep. Additionally endorses dizziness upon standing with a fall d/t dizziness yesterday, bilateral breast pain, headache, frequency, light intermittent abdominal cramping, and intermittent diarrhea. Has taken Tylenol. Patient reports LMP: 21. She is not on birth control and currently trying to get pregnant with multiple "faintly positive" pregnancy tests over the last week with the most recent today. Reports only half of positive line appears. With her prior pregnancies she reports the tests would also show a false negative. Endorses Hx of Anemia without blood transfusions. Denies constipation, hematochezia, melena, vision loss, shortness of breath, chest pain, or palpitations. No further complaints at this time.    The history is provided by the patient. No  was used.     Review of patient's allergies indicates:   Allergen Reactions    Advair diskus [fluticasone propion-salmeterol] Anaphylaxis    Aspartame Anaphylaxis     Other reaction(s): UNKNOWN    Bupropion Anaphylaxis    Chloral hydrate analogues Other (See Comments)     Adverse reaction per grandma    Imitrex [sumatriptan succinate] Anaphylaxis    [place] Anaphylaxis    Metoprolol Shortness Of Breath    Nitroglycerin Anaphylaxis     Other reaction(s): stop breathing    Quetiapine " Hallucinations     Other reaction(s): HALLUCINATION    Reglan [metoclopramide hcl] Shortness Of Breath    Tomato (solanum lycopersicum) Anaphylaxis and Shortness Of Breath    Unable to assess Anaphylaxis     Crabs/not allergic to iodine    Zantac [ranitidine hcl] Shortness Of Breath    Hibiclens (isopropyl alcohol) Itching    Abilify [aripiprazole]     Ambien [zolpidem]     Banana      Other reaction(s): STOP BREATHING    Chloral hydrate      Other reaction(s): adverse affect    Compazine [prochlorperazine edisylate]     Crab      Other reaction(s): STOP BREATHING    Desmopressin     Eggplant      Other reaction(s): EGG PLANT    Honey      Other reaction(s): LIP SWELLING    Ketorolac      Other reaction(s): RASH    Kiwi (actinidia chinensis)     Lexapro [escitalopram oxalate]     Meclizine      hives    Metoclopramide      Other reaction(s): SHORTNESS OF BREATH    Ondansetron      Other reaction(s): RASH    Prochlorperazine      Other reaction(s): RASH    Remeron [mirtazapine]     Sucralose      Other reaction(s): STOPS BREATHING    Tomato      Other reaction(s): stop breathing    Xanax [alprazolam]     Zofran [ondansetron hcl (pf)]     Mayonnaise Rash     Other reaction(s): STOP BREATHING    Sulfa (sulfonamide antibiotics) Rash and Nausea And Vomiting     Past Medical History:   Diagnosis Date    Asthma     Brain tumor, pineal gland     Depression     Endometriosis     Hypertension     Precocious puberty     Seizures     Syncope, cardiogenic     Thyroid disease      Past Surgical History:   Procedure Laterality Date     SECTION      COLONOSCOPY W/ BIOPSIES      ESOPHAGOGASTRODUODENOSCOPY      implantable cardiac monitor placement  2018    TONSILLECTOMY      TYMPANOSTOMY TUBE PLACEMENT       Family History   Problem Relation Age of Onset    Heart disease Mother     Hyperlipidemia Mother     Asthma Mother     Crohn's disease Mother 37        pending  surgery. prior med; sulfasalazine    Depression Mother     Bipolar disorder Mother     Arthritis Mother     Anxiety disorder Mother     Other Mother         PTSD    Hypertension Maternal Grandmother     Asthma Maternal Grandmother     Depression Maternal Grandmother     Anxiety disorder Maternal Grandmother     Hypertension Paternal Grandmother     Diabetes Paternal Grandmother     Nephrolithiasis Father     Nephrolithiasis Brother     ADD / ADHD Brother     Depression Brother     Bipolar disorder Brother     Crohn's disease Maternal Grandfather     Alcohol abuse Paternal Grandfather     Breast cancer Neg Hx     Colon cancer Neg Hx     Ovarian cancer Neg Hx      Social History     Tobacco Use    Smoking status: Passive Smoke Exposure - Never Smoker    Smokeless tobacco: Never Used   Substance Use Topics    Alcohol use: No    Drug use: No     Review of Systems   Eyes: Negative for visual disturbance.   Respiratory: Negative for shortness of breath.    Cardiovascular: Negative for chest pain and palpitations.   Gastrointestinal: Positive for abdominal pain, diarrhea, nausea and vomiting. Negative for blood in stool and constipation.   Genitourinary: Positive for frequency.   Neurological: Positive for dizziness and headaches.   All other systems reviewed and are negative.      Physical Exam     Initial Vitals   BP Pulse Resp Temp SpO2   04/01/22 2133 04/01/22 2131 04/01/22 2131 04/01/22 2131 04/01/22 2131   (!) 137/94 94 20 98.1 °F (36.7 °C) 99 %      MAP       --                Physical Exam    Nursing note and vitals reviewed.  Constitutional: She appears well-developed and well-nourished.   HENT:   Head: Normocephalic and atraumatic.   Right Ear: External ear normal.   Left Ear: External ear normal.   Mouth/Throat: Mucous membranes are dry.   Eyes: Conjunctivae and EOM are normal.   Neck: Phonation normal. Neck supple.   Normal range of motion.  Cardiovascular: Normal rate, regular rhythm,  normal heart sounds and intact distal pulses.   Pulmonary/Chest: Effort normal and breath sounds normal. No stridor. No respiratory distress.   Abdominal: Abdomen is soft. There is no abdominal tenderness.   Musculoskeletal:         General: No tenderness or edema.      Cervical back: Normal range of motion and neck supple.     Neurological: She is alert and oriented to person, place, and time.   Skin: Skin is warm and dry. No rash noted.   Psychiatric: She has a normal mood and affect. Her behavior is normal.         ED Course   Procedures  Labs Reviewed   POCT URINALYSIS W/O SCOPE - Abnormal; Notable for the following components:       Result Value    Spec Grav UA >=1.030 (*)     Blood, UA Trace-intact (*)     All other components within normal limits   POCT LIVER PANEL - Abnormal; Notable for the following components:    ALT (SGPT), POC 83 (*)     AST (SGOT), POC 69 (*)     All other components within normal limits   POCT CMP - Abnormal; Notable for the following components:    ALT (SGPT), POC 74 (*)     AST (SGOT), POC 69 (*)     All other components within normal limits   POCT URINE PREGNANCY   POCT URINALYSIS W/O SCOPE   POCT CBC   POCT CMP   POCT LIVER PANEL   POCT B-HCG (QUANT)   POCT B-HCG (QUANT)          Imaging Results    None          Medications   sodium chloride 0.9% bolus 1,000 mL (0 mLs Intravenous Stopped 4/1/22 2314)   promethazine tablet 25 mg (25 mg Oral Given 4/1/22 2311)     Medical Decision Making:   History:   Old Medical Records: I decided to obtain old medical records.  Clinical Tests:   Lab Tests: Ordered and Reviewed    Labs Reviewed            Admission on 04/01/2022, Discharged on 04/01/2022   Component Date Value Ref Range Status    POC Preg Test, Ur 04/01/2022 Negative  Negative Final     Acceptable 04/01/2022 Yes   Final    Glucose, UA 04/01/2022 Negative   Final    Bilirubin, UA 04/01/2022 Negative   Final    Ketones, UA 04/01/2022 Negative   Final    Spec Grav  UA 04/01/2022 >=1.030 (A)  Final    Blood, UA 04/01/2022 Trace-intact (A)  Final    PH, UA 04/01/2022 6.0   Final    Protein, UA 04/01/2022 Negative   Final    Urobilinogen, UA 04/01/2022 0.2  E.U./dL Final    Nitrite, UA 04/01/2022 Negative   Final    Leukocytes, UA 04/01/2022 Negative   Final    Color, UA 04/01/2022 Yellow   Final    Clarity, UA 04/01/2022 Clear   Final    Albumin, POC 04/01/2022 3.6  3.3 - 5.5 g/dL Final    Alkaline Phosphatase, POC 04/01/2022 75  42 - 141 U/L Final    ALT (SGPT), POC 04/01/2022 83 (A) 10 - 47 U/L Final    Amylase, POC 04/01/2022 36  14 - 97 U/L Final    AST (SGOT), POC 04/01/2022 69 (A) 11 - 38 U/L Final    POC GGT 04/01/2022 21  5 - 65 U/L Final    Bilirubin, POC 04/01/2022 0.6  0.2 - 1.6 mg/dL Final    Protein, POC 04/01/2022 7.7  6.4 - 8.1 g/dL Final    Albumin, POC 04/01/2022 3.4  3.3 - 5.5 g/dL Final    Alkaline Phosphatase, POC 04/01/2022 77  42 - 141 U/L Final    ALT (SGPT), POC 04/01/2022 74 (A) 10 - 47 U/L Final    AST (SGOT), POC 04/01/2022 69 (A) 11 - 38 U/L Final    POC BUN 04/01/2022 8  7 - 22 mg/dL Final    Calcium, POC 04/01/2022 10.2  8.0 - 10.3 mg/dL Final    POC Chloride 04/01/2022 104  98 - 108 mmol/L Final    POC Creatinine 04/01/2022 0.6  0.6 - 1.2 mg/dL Final    POC Glucose 04/01/2022 116  73 - 118 mg/dL Final    POC Potassium 04/01/2022 3.8  3.6 - 5.1 mmol/L Final    POC Sodium 04/01/2022 144  128 - 145 mmol/L Final    Bilirubin, POC 04/01/2022 0.6  0.2 - 1.6 mg/dL Final    POC TCO2 04/01/2022 28  18 - 33 mmol/L Final    Protein, POC 04/01/2022 7.7  6.4 - 8.1 g/dL Final    POC Beta-HCG (Quant) 04/01/2022 <5.0  IU/L Final    Sample 04/01/2022 unknown   Final        Imaging Reviewed    Imaging Results    None         Medications given in ED    Medications   sodium chloride 0.9% bolus 1,000 mL (0 mLs Intravenous Stopped 4/1/22 2314)   promethazine tablet 25 mg (25 mg Oral Given 4/1/22 2311)         Note was created using voice  recognition software. Note may have occasional typographical errors that may not have been identified and edited despite good pat initial review prior to signing.    I, Jory Brennan MD, personally performed the services described in this documentation. All medical record entries made by the scribe were at my direction and in my presence.  I have reviewed the chart and agree that the record reflects my personal performance and is accurate and complete.          Scribe Attestation:   Scribe #1: I performed the above scribed service and the documentation accurately describes the services I performed. I attest to the accuracy of the note.               Vitals:    22 2209 22 2211 22 2214 22 2325   BP: (!) 155/90 (!) 154/90 122/77 (!) 156/92   BP Location: Right arm Right arm Right arm Right arm   Patient Position: Lying Sitting Standing Sitting   Pulse: 105 104 104 98   Resp: 20 20 20 18   Temp:    98.1 °F (36.7 °C)   TempSrc:    Oral   SpO2: 98% 99% 99% 98%   Weight:       Height:           Clinical Impression:   Final diagnoses:  [Z32.02] Pregnancy examination or test, negative result  [R11.2] Non-intractable vomiting with nausea, unspecified vomiting type  [I95.1] Orthostatic hypotension  [N92.6] Missed period (Primary)          ED Disposition Condition    Discharge Stable        ED Prescriptions     Medication Sig Dispense Start Date End Date Auth. Provider    promethazine (PHENERGAN) 25 MG tablet Take 1 tablet (25 mg total) by mouth every 6 (six) hours as needed for Nausea. 15 tablet 2022  Jory Brennan MD    magnesium citrate solution () Take 296 mLs by mouth once. for 1 dose 295 mL 2022 Jory Brennan MD    polyethylene glycol (GLYCOLAX) 17 gram/dose powder Take 17 g by mouth once daily. 119 g 2022  Jory Brennan MD        Follow-up Information     Follow up With Specialties Details Why Contact Info    Caitlin Black MD Family Medicine Call  As needed,  for ongoing care [address]  [facility] [place] [address]  [phone]      West Garcia MD Obstetrics and Gynecology Go to  Monday morning, as previously scheduled [address]  [address]  [facility] [address] [phone]      Your gastroenterologist  Call  Monday morning, to schedule an appointment, for re-evaluation of today's complaint, and ongoing care     The nearest emergency department.  Go to  As needed, If symptoms worsen            Jory Brennan MD  04/19/22 0698

## 2022-05-25 DIAGNOSIS — M62.838 LEVATOR SPASM: Primary | ICD-10-CM

## 2022-05-25 DIAGNOSIS — R10.2 PELVIC PAIN: ICD-10-CM

## 2022-06-16 ENCOUNTER — CLINICAL SUPPORT (OUTPATIENT)
Dept: REHABILITATION | Facility: OTHER | Age: 22
End: 2022-06-16
Payer: MEDICAID

## 2022-06-16 DIAGNOSIS — M62.838 LEVATOR SPASM: ICD-10-CM

## 2022-06-16 DIAGNOSIS — R10.2 PELVIC PAIN: ICD-10-CM

## 2022-06-16 PROBLEM — R27.9 LACK OF COORDINATION: Status: RESOLVED | Noted: 2020-08-14 | Resolved: 2022-06-16

## 2022-06-16 PROBLEM — M79.10 MYALGIA: Status: RESOLVED | Noted: 2020-08-14 | Resolved: 2022-06-16

## 2022-06-16 PROCEDURE — 97162 PT EVAL MOD COMPLEX 30 MIN: CPT

## 2022-06-16 NOTE — PATIENT INSTRUCTIONS
Home Program 6/16/22:    1)     360 Breath - Inhale long, slow and deep. You should feel as if your lower ribs are expanding in all directions like the way an umbrella opens. You should feel the belly, back and sides gently expand and you may notice a relaxation in the pelvic floor.     Continue to breath like this for 10 breaths. Repeat 5 times/day.     2) Belly bulges -  push out lower abdominals like making a beer belly and hold 5 seconds. Do 2-3 sets of 10.    3) Dilators  Vaginal Dilator Instructions   Purpose    Stretch the vaginal tissue    Learn pelvic muscle relaxation    Practice intercourse     Position    Recline in a tub of warm water with both knees bent and legs supported    Reclined on the bed with knees bent     Method    Place a sufficient amount of water-soluble or oil based lubricant on the tip and sides of the dilator    Separate the labia with one hand and insert the dilator with the other    Angle the dilator slightly down toward the table; your health care practitioner will help you locate the correct angle    Keep the pelvic floor muscle relaxed and slowly insert the dilator    Pause if there is significant pain or resistance; allow the muscle time to relax    Continue to insert until 2 inches of the dilator is outside the body    If you are unable to insert the dilator to this depth, hold it at the depth you are able to tolerate with slight to moderate pain    Allow the dilator to stay in place for up to 10 minutes; remove before 10 minutes if the pain is severe    Keep the pelvic floor muscle relaxed     Advancing    When you are able to insert the dilator fully for 10 minutes, you should advance to the next size and repeat the process    Some women prefer to insert a smaller dilator as a warm up before inserting the next size during the same session.    The time needed to advance is individual; sometimes, advancements can be made in 1week intervals and sometimes it takes 2 or 3 week or  longer    Your health care practitioner will assist in deciding when the size should be advanced    Movement can also be introduced; hold onto the end of the dilator and move it slowly and gently in and out    You may practice keith and relaxing your pelvic floor muscles with the dilator inserted    Small dilators can also be used to provide acupressure to the pelvic floor muscle by directing the tip toward the tense muscle; hold steady for 60 to 120 seconds     Points to Remember    You are in control of the dilator; go at your own pace when you are ready    Use adequate lubrication    [place] with different leg and trunk positions as well as angles of insertion to find the best combination    Slow movement is usually best
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
## 2022-07-12 NOTE — ED NOTES
Patient called back   Will go to ER at [facility].   Pt given 32 oz of water for US and instructed to alert nurse when bladder feels full.

## 2022-08-09 ENCOUNTER — TELEPHONE (OUTPATIENT)
Dept: ELECTROPHYSIOLOGY | Facility: CLINIC | Age: 22
End: 2022-08-09
Payer: MEDICAID

## 2022-08-09 DIAGNOSIS — R00.2 PALPITATIONS: Primary | ICD-10-CM

## 2022-09-01 ENCOUNTER — TELEPHONE (OUTPATIENT)
Dept: ELECTROPHYSIOLOGY | Facility: CLINIC | Age: 22
End: 2022-09-01
Payer: MEDICAID

## 2022-11-30 ENCOUNTER — HOSPITAL ENCOUNTER (EMERGENCY)
Facility: HOSPITAL | Age: 22
Discharge: HOME | End: 2022-11-30
Attending: STUDENT IN AN ORGANIZED HEALTH CARE EDUCATION/TRAINING PROGRAM
Payer: MEDICAID

## 2022-11-30 VITALS
OXYGEN SATURATION: 97 % | RESPIRATION RATE: 16 BRPM | SYSTOLIC BLOOD PRESSURE: 133 MMHG | HEART RATE: 93 BPM | DIASTOLIC BLOOD PRESSURE: 91 MMHG | BODY MASS INDEX: 47.84 KG/M2 | TEMPERATURE: 99 F | WEIGHT: 260 LBS | HEIGHT: 62 IN

## 2022-11-30 DIAGNOSIS — R07.9 CHEST PAIN: Primary | ICD-10-CM

## 2022-11-30 LAB
ALBUMIN SERPL BCP-MCNC: 3.2 G/DL (ref 3.5–5.2)
ALP SERPL-CCNC: 60 U/L (ref 55–135)
ALT SERPL W/O P-5'-P-CCNC: 78 U/L (ref 10–44)
ANION GAP SERPL CALC-SCNC: 7 MMOL/L (ref 8–16)
AST SERPL-CCNC: 69 U/L (ref 10–40)
B-HCG UR QL: NEGATIVE
BASOPHILS # BLD AUTO: 0.02 K/UL (ref 0–0.2)
BASOPHILS NFR BLD: 0.2 % (ref 0–1.9)
BILIRUB SERPL-MCNC: 0.3 MG/DL (ref 0.1–1)
BILIRUB UR QL STRIP: NEGATIVE
BNP SERPL-MCNC: <10 PG/ML (ref 0–99)
BUN SERPL-MCNC: 6 MG/DL (ref 6–20)
CALCIUM SERPL-MCNC: 9.1 MG/DL (ref 8.7–10.5)
CHLORIDE SERPL-SCNC: 111 MMOL/L (ref 95–110)
CLARITY UR: CLEAR
CO2 SERPL-SCNC: 24 MMOL/L (ref 23–29)
COLOR UR: YELLOW
CREAT SERPL-MCNC: 0.6 MG/DL (ref 0.5–1.4)
CTP QC/QA: YES
DIFFERENTIAL METHOD: ABNORMAL
EOSINOPHIL # BLD AUTO: 0.2 K/UL (ref 0–0.5)
EOSINOPHIL NFR BLD: 1.8 % (ref 0–8)
ERYTHROCYTE [DISTWIDTH] IN BLOOD BY AUTOMATED COUNT: 15.2 % (ref 11.5–14.5)
EST. GFR  (NO RACE VARIABLE): >60 ML/MIN/1.73 M^2
GLUCOSE SERPL-MCNC: 95 MG/DL (ref 70–110)
GLUCOSE UR QL STRIP: NEGATIVE
HCT VFR BLD AUTO: 35.7 % (ref 37–48.5)
HGB BLD-MCNC: 11 G/DL (ref 12–16)
HGB UR QL STRIP: NEGATIVE
IMM GRANULOCYTES # BLD AUTO: 0.05 K/UL (ref 0–0.04)
IMM GRANULOCYTES NFR BLD AUTO: 0.5 % (ref 0–0.5)
KETONES UR QL STRIP: NEGATIVE
LEUKOCYTE ESTERASE UR QL STRIP: NEGATIVE
LIPASE SERPL-CCNC: 14 U/L (ref 4–60)
LYMPHOCYTES # BLD AUTO: 2.4 K/UL (ref 1–4.8)
LYMPHOCYTES NFR BLD: 25.1 % (ref 18–48)
MAGNESIUM SERPL-MCNC: 1.9 MG/DL (ref 1.6–2.6)
MCH RBC QN AUTO: 24.2 PG (ref 27–31)
MCHC RBC AUTO-ENTMCNC: 30.8 G/DL (ref 32–36)
MCV RBC AUTO: 79 FL (ref 82–98)
MONOCYTES # BLD AUTO: 0.5 K/UL (ref 0.3–1)
MONOCYTES NFR BLD: 5.3 % (ref 4–15)
NEUTROPHILS # BLD AUTO: 6.4 K/UL (ref 1.8–7.7)
NEUTROPHILS NFR BLD: 67.1 % (ref 38–73)
NITRITE UR QL STRIP: NEGATIVE
NRBC BLD-RTO: 0 /100 WBC
PH UR STRIP: 7 [PH] (ref 5–8)
PLATELET # BLD AUTO: 293 K/UL (ref 150–450)
PMV BLD AUTO: 11.4 FL (ref 9.2–12.9)
POC MOLECULAR INFLUENZA A AGN: NEGATIVE
POC MOLECULAR INFLUENZA B AGN: NEGATIVE
POTASSIUM SERPL-SCNC: 3.6 MMOL/L (ref 3.5–5.1)
PROT SERPL-MCNC: 6.8 G/DL (ref 6–8.4)
PROT UR QL STRIP: NEGATIVE
RBC # BLD AUTO: 4.54 M/UL (ref 4–5.4)
SARS-COV-2 RDRP RESP QL NAA+PROBE: NEGATIVE
SODIUM SERPL-SCNC: 142 MMOL/L (ref 136–145)
SP GR UR STRIP: 1.02 (ref 1–1.03)
TROPONIN I SERPL DL<=0.01 NG/ML-MCNC: <0.006 NG/ML (ref 0–0.03)
URN SPEC COLLECT METH UR: NORMAL
UROBILINOGEN UR STRIP-ACNC: NEGATIVE EU/DL
WBC # BLD AUTO: 9.57 K/UL (ref 3.9–12.7)

## 2022-11-30 PROCEDURE — 25000003 PHARM REV CODE 250: Performed by: STUDENT IN AN ORGANIZED HEALTH CARE EDUCATION/TRAINING PROGRAM

## 2022-11-30 PROCEDURE — 83735 ASSAY OF MAGNESIUM: CPT | Performed by: EMERGENCY MEDICINE

## 2022-11-30 PROCEDURE — 80053 COMPREHEN METABOLIC PANEL: CPT | Performed by: EMERGENCY MEDICINE

## 2022-11-30 PROCEDURE — 83690 ASSAY OF LIPASE: CPT | Performed by: EMERGENCY MEDICINE

## 2022-11-30 PROCEDURE — 87635 SARS-COV-2 COVID-19 AMP PRB: CPT | Performed by: EMERGENCY MEDICINE

## 2022-11-30 PROCEDURE — 81003 URINALYSIS AUTO W/O SCOPE: CPT | Performed by: EMERGENCY MEDICINE

## 2022-11-30 PROCEDURE — 93010 EKG 12-LEAD: ICD-10-PCS | Mod: ,,, | Performed by: INTERNAL MEDICINE

## 2022-11-30 PROCEDURE — 93005 ELECTROCARDIOGRAM TRACING: CPT

## 2022-11-30 PROCEDURE — 87502 INFLUENZA DNA AMP PROBE: CPT

## 2022-11-30 PROCEDURE — 83880 ASSAY OF NATRIURETIC PEPTIDE: CPT | Performed by: EMERGENCY MEDICINE

## 2022-11-30 PROCEDURE — 81025 URINE PREGNANCY TEST: CPT | Performed by: EMERGENCY MEDICINE

## 2022-11-30 PROCEDURE — 63600175 PHARM REV CODE 636 W HCPCS: Performed by: STUDENT IN AN ORGANIZED HEALTH CARE EDUCATION/TRAINING PROGRAM

## 2022-11-30 PROCEDURE — 96375 TX/PRO/DX INJ NEW DRUG ADDON: CPT

## 2022-11-30 PROCEDURE — 93010 ELECTROCARDIOGRAM REPORT: CPT | Mod: ,,, | Performed by: INTERNAL MEDICINE

## 2022-11-30 PROCEDURE — 99285 EMERGENCY DEPT VISIT HI MDM: CPT | Mod: 25

## 2022-11-30 PROCEDURE — 85025 COMPLETE CBC W/AUTO DIFF WBC: CPT | Performed by: EMERGENCY MEDICINE

## 2022-11-30 PROCEDURE — 84484 ASSAY OF TROPONIN QUANT: CPT | Performed by: EMERGENCY MEDICINE

## 2022-11-30 PROCEDURE — 96365 THER/PROPH/DIAG IV INF INIT: CPT

## 2022-11-30 RX ORDER — MORPHINE SULFATE 4 MG/ML
2 INJECTION, SOLUTION INTRAMUSCULAR; INTRAVENOUS
Status: COMPLETED | OUTPATIENT
Start: 2022-11-30 | End: 2022-11-30

## 2022-11-30 RX ADMIN — MORPHINE SULFATE 2 MG: 4 INJECTION INTRAVENOUS at 07:11

## 2022-11-30 RX ADMIN — PROMETHAZINE HYDROCHLORIDE 12.5 MG: 25 INJECTION INTRAMUSCULAR; INTRAVENOUS at 08:11

## 2022-12-01 NOTE — ED TRIAGE NOTES
Patient had sharp shooting pain to left anterior chest radiating to left arm at around 4pm today. Pain continues at this time. Has had accompanying nausea & vomiting. Placed on cardiac monitor.

## 2022-12-01 NOTE — ED PROVIDER NOTES
Encounter Date: 11/30/2022    SCRIBE #1 NOTE: I, Julia Rea, am scribing for, and in the presence of,  Jai Panda MD. I have scribed the following portions of the note - Other sections scribed: HPI, ROS, PE.     History     Chief Complaint   Patient presents with    Chest Pain     Chest pain radiating down left arm, numbness to left fingers, left sided headache, blurred vision to bilateral eyes, nausea, and vomiting that started about 3 hours ago. Patient reports CP is constant, 5/10, stabbing and is worse with movement. Reports hx of an MI at the age of 14 and neurogenic cardiovascular syncopy..      Eliu Hunter is a 22 y.o. female, with a past medical history of MI, HTN, cardiogenic syncope, and seizures, who presents to the ED with stabbing chest pain that began 3 hours ago. Pt states the chest pain radiales down the left arm and causes numbness in the left fingers.  She rates the pain at 5/10 and is constant.  Denies any shortness of breath hemoptysis associated chest pain.  Denies any lower extremity swelling.  Pt also complains of a left sided HA, blurred vision, and N/V that began an hour after the chest pain. Pt has been having migraines and is looking for a neurologist.  She reports having similar symptoms with her migraines.  Her last menstrual cycle ended a week ago. No other exacerbating or alleviating factors. Patient denies SOB, blood in urine or stool, abdominal pain, drug usage, smoking, dysuria, or other associated symptoms.      The history is provided by the patient. No  was used.   Review of patient's allergies indicates:   Allergen Reactions    Advair diskus [fluticasone propion-salmeterol] Anaphylaxis    Aspartame Anaphylaxis     Other reaction(s): UNKNOWN    Bupropion Anaphylaxis    Chloral hydrate analogues Other (See Comments)     Adverse reaction per grandma    Imitrex [sumatriptan succinate] Anaphylaxis    Ugo Anaphylaxis    Metoprolol Shortness Of  Breath    Nitroglycerin Anaphylaxis     Other reaction(s): stop breathing    Quetiapine Hallucinations     Other reaction(s): HALLUCINATION    Reglan [metoclopramide hcl] Shortness Of Breath    Tomato (solanum lycopersicum) Anaphylaxis and Shortness Of Breath    Unable to assess Anaphylaxis     Crabs/not allergic to iodine    Zantac [ranitidine hcl] Shortness Of Breath    Hibiclens (isopropyl alcohol) Itching    Abilify [aripiprazole]     Ambien [zolpidem]     Banana      Other reaction(s): STOP BREATHING    Chloral hydrate      Other reaction(s): adverse affect    Compazine [prochlorperazine edisylate]     Crab      Other reaction(s): STOP BREATHING    Desmopressin     Eggplant      Other reaction(s): EGG PLANT    Honey      Other reaction(s): LIP SWELLING    Ketorolac      Other reaction(s): RASH    Kiwi (actinidia chinensis)     Lexapro [escitalopram oxalate]     Meclizine      hives    Metoclopramide      Other reaction(s): SHORTNESS OF BREATH    Ondansetron      Other reaction(s): RASH    Prochlorperazine      Other reaction(s): RASH    Remeron [mirtazapine]     Sucralose      Other reaction(s): STOPS BREATHING    Tomato      Other reaction(s): stop breathing    Xanax [alprazolam]     Zofran [ondansetron hcl (pf)]     Mayonnaise Rash     Other reaction(s): STOP BREATHING    Sulfa (sulfonamide antibiotics) Rash and Nausea And Vomiting     Past Medical History:   Diagnosis Date    Asthma     Brain tumor, pineal gland     Depression     Endometriosis     Hypertension     Precocious puberty     Seizures     Syncope, cardiogenic     Thyroid disease      Past Surgical History:   Procedure Laterality Date     SECTION      COLONOSCOPY W/ BIOPSIES      ESOPHAGOGASTRODUODENOSCOPY      implantable cardiac monitor placement  2018    TONSILLECTOMY      TYMPANOSTOMY TUBE PLACEMENT       Family History   Problem Relation Age of Onset    Heart disease Mother     Hyperlipidemia Mother     Asthma Mother     Crohn's  disease Mother 37        pending surgery. prior med; sulfasalazine    Depression Mother     Bipolar disorder Mother     Arthritis Mother     Anxiety disorder Mother     Other Mother         PTSD    Hypertension Maternal Grandmother     Asthma Maternal Grandmother     Depression Maternal Grandmother     Anxiety disorder Maternal Grandmother     Hypertension Paternal Grandmother     Diabetes Paternal Grandmother     Nephrolithiasis Father     Nephrolithiasis Brother     ADD / ADHD Brother     Depression Brother     Bipolar disorder Brother     Crohn's disease Maternal Grandfather     Alcohol abuse Paternal Grandfather     Breast cancer Neg Hx     Colon cancer Neg Hx     Ovarian cancer Neg Hx      Social History     Tobacco Use    Smoking status: Passive Smoke Exposure - Never Smoker    Smokeless tobacco: Never   Substance Use Topics    Alcohol use: No    Drug use: No     Review of Systems   Constitutional:  Negative for fever.   HENT:  Negative for facial swelling and voice change.    Eyes:  Positive for visual disturbance. Negative for pain.   Respiratory:  Negative for choking and shortness of breath.    Cardiovascular:  Positive for chest pain and leg swelling.   Gastrointestinal:  Positive for nausea and vomiting. Negative for abdominal pain and blood in stool.   Genitourinary:  Negative for dysuria, frequency and hematuria.   Musculoskeletal:  Negative for back pain.   Skin:  Negative for rash.   Neurological:  Positive for numbness (left fingers) and headaches. Negative for weakness.   Psychiatric/Behavioral:  Negative for self-injury.      Physical Exam     Initial Vitals [11/30/22 1832]   BP Pulse Resp Temp SpO2   (!) 144/95 106 18 98.6 °F (37 °C) 98 %      MAP       --         Physical Exam    Nursing note and vitals reviewed.  Constitutional: She is not diaphoretic. No distress.   HENT:   Head: Normocephalic and atraumatic.   Protecting airway   Eyes: Conjunctivae and EOM are normal. No scleral icterus.    Neck: Neck supple. No tracheal deviation present.   Normal range of motion.  Cardiovascular:  Normal rate, regular rhythm and intact distal pulses.           Pulmonary/Chest: No stridor. No respiratory distress.   Speaking in full sentences   Abdominal: Abdomen is soft. She exhibits no distension. There is no abdominal tenderness.   Musculoskeletal:         General: No tenderness or edema.      Cervical back: Normal range of motion and neck supple.     Neurological: She is alert. She has normal strength. No cranial nerve deficit or sensory deficit.   Skin: Skin is warm and dry.   Psychiatric: She has a normal mood and affect.       ED Course   Procedures  Labs Reviewed   CBC W/ AUTO DIFFERENTIAL - Abnormal; Notable for the following components:       Result Value    Hemoglobin 11.0 (*)     Hematocrit 35.7 (*)     MCV 79 (*)     MCH 24.2 (*)     MCHC 30.8 (*)     RDW 15.2 (*)     Immature Grans (Abs) 0.05 (*)     All other components within normal limits   COMPREHENSIVE METABOLIC PANEL - Abnormal; Notable for the following components:    Chloride 111 (*)     Albumin 3.2 (*)     AST 69 (*)     ALT 78 (*)     Anion Gap 7 (*)     All other components within normal limits   B-TYPE NATRIURETIC PEPTIDE   LIPASE   MAGNESIUM   TROPONIN I   URINALYSIS, REFLEX TO URINE CULTURE    Narrative:     Specimen Source->Urine   POCT URINE PREGNANCY   SARS-COV-2 RDRP GENE   POCT INFLUENZA A/B MOLECULAR          Imaging Results              X-Ray Chest AP Portable (Final result)  Result time 11/30/22 19:59:08      Final result by Cherelle Lim MD (11/30/22 19:59:08)                   Impression:      No acute intrathoracic abnormality detected.      Electronically signed by: Cherelle Lim  Date:    11/30/2022  Time:    19:59               Narrative:    EXAMINATION:  AP PORTABLE CHEST    CLINICAL HISTORY:  chest pain;    TECHNIQUE:  AP portable chest radiograph was submitted.    COMPARISON:  12/05/2019    FINDINGS:  A left-sided  cardiac loop recorder is present.  AP portable chest radiograph demonstrates a cardiac silhouette within normal limits.  There is no focal consolidation, pneumothorax, or pleural effusion.                                       Medications   morphine injection 2 mg (2 mg Intravenous Given 11/30/22 1937)   promethazine (PHENERGAN) 12.5 mg in dextrose 5 % 50 mL IVPB (0 mg Intravenous Stopped 11/30/22 2025)     Medical Decision Making:   Initial Assessment:   22 y.o. female, with a past medical history of MI, HTN, cardiogenic syncope, and seizures, who presents to the ED with stabbing chest pain that began 3 hours ago.  History without high risk features (e.g., not substernal, no exertional component, not relieved with rest ). Minimal CAD risk factors, recent negative stress test (<2 years). Exam without evidence of volume overload. EKG without signs of active ischemia. HEART score: 0. Given the timing of pain to ER presentation, plan to send single troponin to evaluate for NSTEMI. Presentation not consistent with acute PE, pneumothorax, thoracic arotic dissection, cardiac effusion or tamponade.  Differential diagnosis for headache includes migraine versus tension type headache. No headache red flags. Neurologic exam without evidence of meningismus, focal neurologic findings. Presentation not consistent with acute intracranial bleed to include SAH (lack of risk factors, headache history). Presentation not consistent with acute CNS infection to include meningitis or brain abscess, Temporal arteritis unlikely, as is acute angle closure glaucoma given history and physical findings. Presentation not consistent with other acute, emergent causes of headache at this time. Plan to treat symptomatically with pain medication.      Plan: labs, troponin, EKG, CXR, pain control, serial reassessment    Clinical Tests:   Lab Tests: Ordered and Reviewed  Radiological Study: Ordered and Reviewed  Medical Tests: Ordered and Reviewed         Scribe Attestation:   Scribe #1: I performed the above scribed service and the documentation accurately describes the services I performed. I attest to the accuracy of the note.      ED Course as of 11/30/22 2223 Wed Nov 30, 2022 2036 CBC without significant leukocytosis, anemia, or platelet abnormalities. Chem 14 negative for hypo-or hyper natremia, kalemia, chloridemia, or other electrolyte abnormalities; BUN and creatinine were within normal limits indicating normal kidney function, ALT and AST were within normal limits indicating normal liver function.  Troponin and BNP within normal limits med lipase negative. [AS]   2049 Pt is currently stable for discharge. I discussed with the patient/family the diagnosis, treatment plan, indications for return to the emergency department, and for expected follow-up. The patient/family verbalized an understanding. The patient/family is asked if there are any questions or concerns. We discuss the case, until all issues are addressed to the patient/family's satisfaction. Patient/family understands and is agreeable to the plan.   [AS]      ED Course User Index  [AS] Jai Panda MD                 Clinical Impression:   Final diagnoses:  [R07.9] Chest pain (Primary)     This dictation has been generated using M-Modal Fluency Direct dictation; some phonetic errors may occur.      ED Disposition Condition    Discharge Stable          ED Prescriptions    None       Follow-up Information       Follow up With Specialties Details Why Contact Info    Caitlin Blcak MD Family Medicine  For reassessment [address] [facility] [address]  [facility] [address] [phone]      Neurology  Schedule an appointment as soon as possible for a visit  For reassessment and management of migraines     [facility] Emergency Dept Emergency Medicine  If symptoms worsen [address] [place], [place] [address] [address] [phone]          IJai MD, personally  performed the services described in this documentation. All medical record entries made by the scribe were at my direction and in my presence. I have reviewed the chart and agree that the record reflects my personal performance and is accurate and complete.      Jai Panda MD  11/30/22 [id number]

## 2022-12-01 NOTE — DISCHARGE INSTRUCTIONS
Thank you for coming to our Emergency Department today. It is important to remember that some problems are difficult to diagnose and may not be found during your first visit. Be sure to follow up with your primary care doctor and review any labs/imaging that was performed with them. If you do not have a primary care doctor, you may contact the one listed on your discharge paperwork or you may also call the [facility] Appointment Desk at [phone] to schedule an appointment with one.     All medications may potentially have side effects and it is impossible to predict which medications may give you side effects. If you feel that you are having a negative effect of any medication you should immediately stop taking them and seek medical attention.    Return to the ER with any questions/concerns, new/concerning symptoms, worsening or failure to improve. Do not drive or make any important decisions for 24 hours if you have received any pain medications, sedatives or mood altering drugs during your ER visit.

## 2022-12-06 ENCOUNTER — HOSPITAL ENCOUNTER (EMERGENCY)
Facility: HOSPITAL | Age: 22
Discharge: HOME | End: 2022-12-06
Attending: EMERGENCY MEDICINE
Payer: MEDICAID

## 2022-12-06 VITALS
HEIGHT: 61 IN | OXYGEN SATURATION: 100 % | DIASTOLIC BLOOD PRESSURE: 69 MMHG | WEIGHT: 262 LBS | RESPIRATION RATE: 18 BRPM | TEMPERATURE: 98 F | HEART RATE: 98 BPM | BODY MASS INDEX: 49.47 KG/M2 | SYSTOLIC BLOOD PRESSURE: 125 MMHG

## 2022-12-06 DIAGNOSIS — R11.2 NAUSEA AND VOMITING, UNSPECIFIED VOMITING TYPE: ICD-10-CM

## 2022-12-06 DIAGNOSIS — R10.84 GENERALIZED ABDOMINAL PAIN: Primary | ICD-10-CM

## 2022-12-06 LAB
ALBUMIN SERPL-MCNC: 3.5 G/DL (ref 3.3–5.5)
ALBUMIN SERPL-MCNC: 3.5 G/DL (ref 3.3–5.5)
ALP SERPL-CCNC: 58 U/L (ref 42–141)
ALP SERPL-CCNC: 58 U/L (ref 42–141)
B-HCG UR QL: NEGATIVE
BILIRUB SERPL-MCNC: 0.6 MG/DL (ref 0.2–1.6)
BILIRUB SERPL-MCNC: 0.6 MG/DL (ref 0.2–1.6)
BILIRUBIN, POC UA: NEGATIVE
BLOOD, POC UA: NEGATIVE
BUN SERPL-MCNC: 8 MG/DL (ref 7–22)
CALCIUM SERPL-MCNC: 9.6 MG/DL (ref 8–10.3)
CHLORIDE SERPL-SCNC: 105 MMOL/L (ref 98–108)
CLARITY, POC UA: CLEAR
COLOR, POC UA: YELLOW
CREAT SERPL-MCNC: 0.4 MG/DL (ref 0.6–1.2)
CTP QC/QA: YES
GLUCOSE SERPL-MCNC: 88 MG/DL (ref 73–118)
GLUCOSE, POC UA: NEGATIVE
HCT, POC: NORMAL
HGB, POC: NORMAL (ref 14–18)
KETONES, POC UA: NEGATIVE
LEUKOCYTE EST, POC UA: NEGATIVE
MCH, POC: NORMAL
MCHC, POC: NORMAL
MCV, POC: NORMAL
MPV, POC: NORMAL
NITRITE, POC UA: NEGATIVE
PH UR STRIP: 6 [PH]
POC ALT (SGPT): 67 U/L (ref 10–47)
POC ALT (SGPT): 71 U/L (ref 10–47)
POC AMYLASE: 31 U/L (ref 14–97)
POC AST (SGOT): 71 U/L (ref 11–38)
POC AST (SGOT): 74 U/L (ref 11–38)
POC GGT: 23 U/L (ref 5–65)
POC PLATELET COUNT: NORMAL
POC TCO2: 29 MMOL/L (ref 18–33)
POTASSIUM BLD-SCNC: 4.1 MMOL/L (ref 3.6–5.1)
PROTEIN, POC UA: NEGATIVE
PROTEIN, POC: 6.8 G/DL (ref 6.4–8.1)
PROTEIN, POC: 7 G/DL (ref 6.4–8.1)
RBC, POC: NORMAL
RDW, POC: NORMAL
SODIUM BLD-SCNC: 146 MMOL/L (ref 128–145)
SPECIFIC GRAVITY, POC UA: 1.02
UROBILINOGEN, POC UA: 0.2 E.U./DL
WBC, POC: NORMAL

## 2022-12-06 PROCEDURE — 82962 GLUCOSE BLOOD TEST: CPT | Mod: ER

## 2022-12-06 PROCEDURE — 96361 HYDRATE IV INFUSION ADD-ON: CPT | Mod: ER

## 2022-12-06 PROCEDURE — 81025 URINE PREGNANCY TEST: CPT | Mod: ER | Performed by: EMERGENCY MEDICINE

## 2022-12-06 PROCEDURE — 25000003 PHARM REV CODE 250: Mod: ER | Performed by: PHYSICIAN ASSISTANT

## 2022-12-06 PROCEDURE — 63600175 PHARM REV CODE 636 W HCPCS: Mod: ER | Performed by: PHYSICIAN ASSISTANT

## 2022-12-06 PROCEDURE — 99284 EMERGENCY DEPT VISIT MOD MDM: CPT | Mod: 25,ER

## 2022-12-06 PROCEDURE — 96375 TX/PRO/DX INJ NEW DRUG ADDON: CPT | Mod: ER

## 2022-12-06 PROCEDURE — 96365 THER/PROPH/DIAG IV INF INIT: CPT | Mod: ER

## 2022-12-06 RX ORDER — HYDROCODONE BITARTRATE AND ACETAMINOPHEN 5; 325 MG/1; MG/1
1 TABLET ORAL EVERY 6 HOURS PRN
Qty: 12 TABLET | Refills: 0 | Status: SHIPPED | OUTPATIENT
Start: 2022-12-06 | End: 2022-12-09

## 2022-12-06 RX ORDER — PROMETHAZINE HYDROCHLORIDE 25 MG/1
25 TABLET ORAL EVERY 6 HOURS PRN
Qty: 15 TABLET | Refills: 0 | Status: SHIPPED | OUTPATIENT
Start: 2022-12-06 | End: 2023-12-15

## 2022-12-06 RX ORDER — MORPHINE SULFATE 4 MG/ML
3 INJECTION, SOLUTION INTRAMUSCULAR; INTRAVENOUS
Status: COMPLETED | OUTPATIENT
Start: 2022-12-06 | End: 2022-12-06

## 2022-12-06 RX ADMIN — MORPHINE SULFATE 3 MG: 4 INJECTION INTRAVENOUS at 09:12

## 2022-12-06 RX ADMIN — PROMETHAZINE HYDROCHLORIDE 25 MG: 25 INJECTION INTRAMUSCULAR; INTRAVENOUS at 07:12

## 2022-12-06 RX ADMIN — SODIUM CHLORIDE 1000 ML: 0.9 INJECTION, SOLUTION INTRAVENOUS at 07:12

## 2022-12-07 NOTE — ED PROVIDER NOTES
Encounter Date: 12/6/2022    SCRIBE #1 NOTE: I, Neida Garcia, am scribing for, and in the presence of,  Adan Chaudhry PA-C. I have scribed the following portions of the note - Other sections scribed: HPI, ROS, PE.     History     Chief Complaint   Patient presents with    Abdominal Pain     Reports lower abdominal pain that radiates to lower back that started 45 minutes ago. +n/v, denies changes in urinary habits.      22 year old female with HTN presents to the ED with complaints of stabbing lower abdominal pain radiating to the back beginning today. Pt notes associated symptoms of back pain and N/V. Denies dysuria, frequency and vaginal bleeding. No alleviating or exacerbating factors mentioned. Pt adds that she has never experienced these symptoms before. Denies history of abdominal surgery or gallbladder and pancreas issues.     The history is provided by the patient. No  was used.   Review of patient's allergies indicates:   Allergen Reactions    Advair diskus [fluticasone propion-salmeterol] Anaphylaxis    Aspartame Anaphylaxis     Other reaction(s): UNKNOWN    Bupropion Anaphylaxis    Chloral hydrate analogues Other (See Comments)     Adverse reaction per grandma    Imitrex [sumatriptan succinate] Anaphylaxis    Ugo Anaphylaxis    Metoprolol Shortness Of Breath    Nitroglycerin Anaphylaxis     Other reaction(s): stop breathing    Quetiapine Hallucinations     Other reaction(s): HALLUCINATION    Reglan [metoclopramide hcl] Shortness Of Breath    Tomato (solanum lycopersicum) Anaphylaxis and Shortness Of Breath    Unable to assess Anaphylaxis     Crabs/not allergic to iodine    Zantac [ranitidine hcl] Shortness Of Breath    Hibiclens (isopropyl alcohol) Itching    Abilify [aripiprazole]     Ambien [zolpidem]     Banana      Other reaction(s): STOP BREATHING    Chloral hydrate      Other reaction(s): adverse affect    Compazine [prochlorperazine edisylate]     Crab      Other  reaction(s): STOP BREATHING    Desmopressin     Eggplant      Other reaction(s): EGG PLANT    Honey      Other reaction(s): LIP SWELLING    Ketorolac      Other reaction(s): RASH    Kiwi (actinidia chinensis)     Lexapro [escitalopram oxalate]     Meclizine      hives    Metoclopramide      Other reaction(s): SHORTNESS OF BREATH    Ondansetron      Other reaction(s): RASH    Prochlorperazine      Other reaction(s): RASH    Remeron [mirtazapine]     Sucralose      Other reaction(s): STOPS BREATHING    Tomato      Other reaction(s): stop breathing    Xanax [alprazolam]     Zofran [ondansetron hcl (pf)]     Mayonnaise Rash     Other reaction(s): STOP BREATHING    Sulfa (sulfonamide antibiotics) Rash and Nausea And Vomiting     Past Medical History:   Diagnosis Date    Asthma     Brain tumor, pineal gland     Depression     Endometriosis     Hypertension     Precocious puberty     Seizures     Syncope, cardiogenic     Thyroid disease      Past Surgical History:   Procedure Laterality Date     SECTION      COLONOSCOPY W/ BIOPSIES      ESOPHAGOGASTRODUODENOSCOPY      implantable cardiac monitor placement  2018    TONSILLECTOMY      TYMPANOSTOMY TUBE PLACEMENT       Family History   Problem Relation Age of Onset    Heart disease Mother     Hyperlipidemia Mother     Asthma Mother     Crohn's disease Mother 37        pending surgery. prior med; sulfasalazine    Depression Mother     Bipolar disorder Mother     Arthritis Mother     Anxiety disorder Mother     Other Mother         PTSD    Hypertension Maternal Grandmother     Asthma Maternal Grandmother     Depression Maternal Grandmother     Anxiety disorder Maternal Grandmother     Hypertension Paternal Grandmother     Diabetes Paternal Grandmother     Nephrolithiasis Father     Nephrolithiasis Brother     ADD / ADHD Brother     Depression Brother     Bipolar disorder Brother     Crohn's disease Maternal Grandfather     Alcohol abuse Paternal Grandfather      Breast cancer Neg Hx     Colon cancer Neg Hx     Ovarian cancer Neg Hx      Social History     Tobacco Use    Smoking status: Passive Smoke Exposure - Never Smoker    Smokeless tobacco: Never   Substance Use Topics    Alcohol use: No    Drug use: No     Review of Systems   Constitutional:  Negative for fever.   HENT:  Negative for congestion, sore throat and trouble swallowing.    Respiratory:  Negative for cough and shortness of breath.    Cardiovascular:  Negative for chest pain.   Gastrointestinal:  Positive for abdominal pain, nausea and vomiting. Negative for constipation and diarrhea.   Genitourinary:  Negative for dysuria, flank pain, frequency, urgency and vaginal bleeding.   Musculoskeletal:  Positive for back pain.   Skin:  Negative for rash.   Neurological:  Negative for headaches.   All other systems reviewed and are negative.    Physical Exam     Initial Vitals   BP Pulse Resp Temp SpO2   12/06/22 1834 12/06/22 1834 12/06/22 1834 12/06/22 1834 12/06/22 1924   (!) 155/99 100 18 98.1 °F (36.7 °C) 100 %      MAP       --                Physical Exam    Nursing note and vitals reviewed.  Constitutional: She appears well-developed and well-nourished. She is not diaphoretic. No distress.   HENT:   Head: Normocephalic and atraumatic.   Nose: Nose normal.   Eyes: Conjunctivae and EOM are normal. Right eye exhibits no discharge. Left eye exhibits no discharge.   Neck: No tracheal deviation present. No JVD present.   Normal range of motion.  Cardiovascular:  Normal rate, regular rhythm and normal heart sounds.     Exam reveals no friction rub.       No murmur heard.  Pulmonary/Chest: Breath sounds normal. No stridor. No respiratory distress. She has no wheezes. She has no rhonchi. She has no rales. She exhibits no tenderness.   Abdominal: There is generalized abdominal tenderness (Mild) and tenderness in the suprapubic area.   Musculoskeletal:         General: No tenderness or edema.      Cervical back: Normal  range of motion.     Neurological: She is alert and oriented to person, place, and time.   Skin: Skin is warm and dry. No rash and no abscess noted. No erythema. No pallor.   Psychiatric: She has a normal mood and affect. Thought content normal.       ED Course   Procedures  Labs Reviewed   POCT LIVER PANEL - Abnormal; Notable for the following components:       Result Value    ALT (SGPT), POC 67 (*)     AST (SGOT), POC 71 (*)     All other components within normal limits   POCT CMP - Abnormal; Notable for the following components:    ALT (SGPT), POC 71 (*)     AST (SGOT), POC 74 (*)     POC Creatinine 0.4 (*)     POC Sodium 146 (*)     All other components within normal limits   POCT URINALYSIS W/O SCOPE   POCT URINE PREGNANCY   POCT CBC   POCT URINALYSIS W/O SCOPE   POCT URINALYSIS W/O SCOPE   POCT CMP   POCT LIVER PANEL            Imaging Results    None          Medications   sodium chloride 0.9% bolus 1,000 mL (0 mLs Intravenous Stopped 12/6/22 2059)   promethazine (PHENERGAN) 25 mg in dextrose 5 % 50 mL IVPB (0 mg Intravenous Stopped 12/6/22 2014)   morphine injection 3 mg (3 mg Intravenous Given 12/6/22 2100)     Medical Decision Making:   History:   Old Medical Records: I decided to obtain old medical records.  Clinical Tests:   Lab Tests: Ordered and Reviewed  ED Management:  Nausea resolves.  Continues to endorse mild generalized abdominal pain.  Transaminitis at baseline.  Workup overall reassuring. I am unsure of the etiology of this patient's symptoms, however, I do not believe they are of emergent/life threatening etiology at this time. Could be early viral process?  Lower suspicion for obstruction, acute appendicitis, acute pancreatitis, and acute cholecystitis.        Scribe Attestation:   Scribe #1: I performed the above scribed service and the documentation accurately describes the services I performed. I attest to the accuracy of the note.                 I, Adan Chaudhry PA-C, personally  performed the services described in this documentation.  All medical record entries made by the scribe were at my direction and in my presence.  I have reviewed the chart and agree that the record reflects my personal performance and is accurate and complete.  Clinical Impression:   Final diagnoses:  [R10.84] Generalized abdominal pain (Primary)  [R11.2] Nausea and vomiting, unspecified vomiting type      ED Disposition Condition    Discharge Stable          ED Prescriptions       Medication Sig Dispense Start Date End Date Auth. Provider    HYDROcodone-acetaminophen (NORCO) 5-325 mg per tablet Take 1 tablet by mouth every 6 (six) hours as needed for Pain. 12 tablet 12/6/2022 12/9/2022 Adan Chaudhry PA-C    promethazine (PHENERGAN) 25 MG tablet Take 1 tablet (25 mg total) by mouth every 6 (six) hours as needed for Nausea. 15 tablet 12/6/2022 -- Adan Chaudhry PA-C          Follow-up Information       Follow up With Specialties Details Why Contact Info    Caitlin Black MD Family Medicine Schedule an appointment as soon as possible for a visit in 1 day For re-evaluation [address]  [facility] [address] [phone]      [facility] ED Emergency Medicine Go to  If symptoms worsen [address] [facility] [address] [phone]             Adan Chaudhry PA-C  12/06/22 7936

## 2022-12-07 NOTE — DISCHARGE INSTRUCTIONS

## 2023-12-15 ENCOUNTER — HOSPITAL ENCOUNTER (EMERGENCY)
Facility: HOSPITAL | Age: 23
Discharge: HOME | End: 2023-12-15
Attending: EMERGENCY MEDICINE
Payer: MEDICAID

## 2023-12-15 VITALS
TEMPERATURE: 99 F | HEIGHT: 61 IN | BODY MASS INDEX: 46.26 KG/M2 | DIASTOLIC BLOOD PRESSURE: 88 MMHG | HEART RATE: 115 BPM | RESPIRATION RATE: 17 BRPM | OXYGEN SATURATION: 96 % | SYSTOLIC BLOOD PRESSURE: 133 MMHG | WEIGHT: 245 LBS

## 2023-12-15 DIAGNOSIS — J06.9 URI WITH COUGH AND CONGESTION: Primary | ICD-10-CM

## 2023-12-15 DIAGNOSIS — R05.9 COUGH: ICD-10-CM

## 2023-12-15 LAB
B-HCG UR QL: NEGATIVE
BILIRUBIN, POC UA: NEGATIVE
BLOOD, POC UA: ABNORMAL
CLARITY, POC UA: CLEAR
COLOR, POC UA: YELLOW
CTP QC/QA: YES
GLUCOSE, POC UA: NEGATIVE
KETONES, POC UA: NEGATIVE
LEUKOCYTE EST, POC UA: NEGATIVE
NITRITE, POC UA: NEGATIVE
PH UR STRIP: 8.5 [PH]
PROTEIN, POC UA: ABNORMAL
SPECIFIC GRAVITY, POC UA: 1.01
UROBILINOGEN, POC UA: 1 E.U./DL

## 2023-12-15 PROCEDURE — 99284 EMERGENCY DEPT VISIT MOD MDM: CPT | Mod: 25,ER

## 2023-12-15 PROCEDURE — 96372 THER/PROPH/DIAG INJ SC/IM: CPT | Performed by: EMERGENCY MEDICINE

## 2023-12-15 PROCEDURE — 81025 URINE PREGNANCY TEST: CPT | Mod: ER | Performed by: EMERGENCY MEDICINE

## 2023-12-15 PROCEDURE — 81003 URINALYSIS AUTO W/O SCOPE: CPT | Mod: ER

## 2023-12-15 PROCEDURE — 63600175 PHARM REV CODE 636 W HCPCS: Mod: ER | Performed by: EMERGENCY MEDICINE

## 2023-12-15 PROCEDURE — 25000003 PHARM REV CODE 250: Mod: ER | Performed by: EMERGENCY MEDICINE

## 2023-12-15 RX ORDER — BENZONATATE 100 MG/1
100 CAPSULE ORAL 3 TIMES DAILY PRN
Qty: 20 CAPSULE | Refills: 0 | Status: SHIPPED | OUTPATIENT
Start: 2023-12-15 | End: 2023-12-25

## 2023-12-15 RX ORDER — PROMETHAZINE HYDROCHLORIDE 25 MG/1
25 SUPPOSITORY RECTAL EVERY 6 HOURS PRN
Qty: 10 SUPPOSITORY | Refills: 0 | Status: SHIPPED | OUTPATIENT
Start: 2023-12-15 | End: 2024-05-30

## 2023-12-15 RX ORDER — BUTALBITAL, ACETAMINOPHEN AND CAFFEINE 50; 325; 40 MG/1; MG/1; MG/1
1 TABLET ORAL
Status: COMPLETED | OUTPATIENT
Start: 2023-12-15 | End: 2023-12-15

## 2023-12-15 RX ORDER — MONTELUKAST SODIUM 10 MG/1
10 TABLET ORAL NIGHTLY
Qty: 30 TABLET | Refills: 0 | Status: SHIPPED | OUTPATIENT
Start: 2023-12-15 | End: 2024-01-14

## 2023-12-15 RX ORDER — DEXAMETHASONE SODIUM PHOSPHATE 4 MG/ML
12 INJECTION, SOLUTION INTRA-ARTICULAR; INTRALESIONAL; INTRAMUSCULAR; INTRAVENOUS; SOFT TISSUE
Status: COMPLETED | OUTPATIENT
Start: 2023-12-15 | End: 2023-12-15

## 2023-12-15 RX ORDER — PROMETHAZINE HYDROCHLORIDE 25 MG/1
25 TABLET ORAL EVERY 6 HOURS PRN
Qty: 15 TABLET | Refills: 0 | Status: SHIPPED | OUTPATIENT
Start: 2023-12-15 | End: 2024-05-30

## 2023-12-15 RX ORDER — PROMETHAZINE HYDROCHLORIDE 25 MG/1
25 TABLET ORAL
Status: COMPLETED | OUTPATIENT
Start: 2023-12-15 | End: 2023-12-15

## 2023-12-15 RX ORDER — AZELASTINE 1 MG/ML
1 SPRAY, METERED NASAL 2 TIMES DAILY
Qty: 30 ML | Refills: 0 | Status: SHIPPED | OUTPATIENT
Start: 2023-12-15 | End: 2024-01-14

## 2023-12-15 RX ORDER — PROMETHAZINE HYDROCHLORIDE AND DEXTROMETHORPHAN HYDROBROMIDE 6.25; 15 MG/5ML; MG/5ML
SYRUP ORAL
COMMUNITY

## 2023-12-15 RX ORDER — LORATADINE 10 MG/1
10 TABLET ORAL EVERY MORNING
Qty: 60 TABLET | Refills: 0 | Status: SHIPPED | OUTPATIENT
Start: 2023-12-15 | End: 2024-12-14

## 2023-12-15 RX ORDER — DICYCLOMINE HYDROCHLORIDE 10 MG/1
10 CAPSULE ORAL
COMMUNITY

## 2023-12-15 RX ORDER — PREDNISONE 20 MG/1
40 TABLET ORAL DAILY
Qty: 10 TABLET | Refills: 0 | Status: SHIPPED | OUTPATIENT
Start: 2023-12-15 | End: 2023-12-20

## 2023-12-15 RX ADMIN — PROMETHAZINE HYDROCHLORIDE 25 MG: 25 TABLET ORAL at 10:12

## 2023-12-15 RX ADMIN — DEXAMETHASONE SODIUM PHOSPHATE 12 MG: 4 INJECTION INTRA-ARTICULAR; INTRALESIONAL; INTRAMUSCULAR; INTRAVENOUS; SOFT TISSUE at 10:12

## 2023-12-15 RX ADMIN — BUTALBITAL, ACETAMINOPHEN, AND CAFFEINE 1 TABLET: 325; 50; 40 TABLET ORAL at 10:12

## 2023-12-16 NOTE — ED PROVIDER NOTES
Encounter Date: 12/15/2023       History     Chief Complaint   Patient presents with    General Illness     Cough, headache and vomiting not improved after visit to urgent care visit 2 days ago. Given cough syrup that is not helping.      23 y.o. female with multiple medical problems including asthma, endometriosis, HTN and others presents emergency department complaining of acute productive cough with clear sputum, postnasal drip, mild, frontal headache, photophobia, chest pain (with deep inspiration only), shortness of breath and fatigue that began four days ago as well as acute on chronic nausea, vomiting (6-7 episodes of non-bloody, non-bilious, non-coffee-grind appearing emesis), abdominal cramps and diarrhea (5 episodes of non-bloody, non-melenic stools) that has been intermittently recurrent for years related to diagnosis of Crohn's disease that began to flare-up four days ago.  She reports being seen at urgent care four days ago where she received the prescription for Phenergan DM and an albuterol inhaler with reported negative rapid COVID-19/Flu/RSV at that time.  She reports temporary relief of URI symptoms.with Tylenol, Phenergan DM and albuterol inhaler. She denies fever, vision disturbance, nasal congestion, rhinorrhea, or leg swelling.  Denies use of long term medication for Crohn's and indicates intermittent resolution of Crohn's symptoms with a course of steroids. She reports taking dicyclomine with temporary relief of Crohn's flare.  She mentions completing a 10 day course of Augmentin when she was seen at urgent care for similar symptoms three weeks ago.  She endorses chronic seasonal allergies and endorses taking Zyrtec and a nasal spray in the past but denies taking any allergy medications within the last 4 days.    She denies tobacco use  Wears eyeglasses  Denies prior abdominal surgery    The history is provided by the patient.     Review of patient's allergies indicates:   Allergen Reactions     Advair diskus [fluticasone propion-salmeterol] Anaphylaxis    Aspartame Anaphylaxis     Other reaction(s): UNKNOWN    Bupropion Anaphylaxis    Chloral hydrate analogues Other (See Comments)     Adverse reaction per grandma    Imitrex [sumatriptan succinate] Anaphylaxis    Ugo Anaphylaxis    Metoprolol Shortness Of Breath    Nitroglycerin Anaphylaxis     Other reaction(s): stop breathing    Quetiapine Hallucinations     Other reaction(s): HALLUCINATION    Reglan [metoclopramide hcl] Shortness Of Breath    Tomato (solanum lycopersicum) Anaphylaxis and Shortness Of Breath    Unable to assess Anaphylaxis     Crabs/not allergic to iodine    Zantac [ranitidine hcl] Shortness Of Breath    Hibiclens (isopropyl alcohol) Itching    Abilify [aripiprazole]     Ambien [zolpidem]     Banana      Other reaction(s): STOP BREATHING    Chloral hydrate      Other reaction(s): adverse affect    Compazine [prochlorperazine edisylate]     Crab      Other reaction(s): STOP BREATHING    Desmopressin     Eggplant      Other reaction(s): EGG PLANT    Honey      Other reaction(s): LIP SWELLING    Ketorolac      Other reaction(s): RASH    Kiwi (actinidia chinensis)     Lexapro [escitalopram oxalate]     Meclizine      hives    Metoclopramide      Other reaction(s): SHORTNESS OF BREATH    Ondansetron      Other reaction(s): RASH    Prochlorperazine      Other reaction(s): RASH    Remeron [mirtazapine]     Sucralose      Other reaction(s): STOPS BREATHING    Tomato      Other reaction(s): stop breathing    Xanax [alprazolam]     Zofran [ondansetron hcl (pf)]     Mayonnaise Rash     Other reaction(s): STOP BREATHING    Sulfa (sulfonamide antibiotics) Rash and Nausea And Vomiting     Past Medical History:   Diagnosis Date    Asthma     Brain tumor, pineal gland     Depression     Endometriosis     Hypertension     Precocious puberty     Seizures     Syncope, cardiogenic     Thyroid disease      Past Surgical History:   Procedure Laterality  Date     SECTION      COLONOSCOPY W/ BIOPSIES      ESOPHAGOGASTRODUODENOSCOPY      implantable cardiac monitor placement  2018    TONSILLECTOMY      TYMPANOSTOMY TUBE PLACEMENT       Family History   Problem Relation Age of Onset    Heart disease Mother     Hyperlipidemia Mother     Asthma Mother     Crohn's disease Mother 37        pending surgery. prior med; sulfasalazine    Depression Mother     Bipolar disorder Mother     Arthritis Mother     Anxiety disorder Mother     Other Mother         PTSD    Hypertension Maternal Grandmother     Asthma Maternal Grandmother     Depression Maternal Grandmother     Anxiety disorder Maternal Grandmother     Hypertension Paternal Grandmother     Diabetes Paternal Grandmother     Nephrolithiasis Father     Nephrolithiasis Brother     ADD / ADHD Brother     Depression Brother     Bipolar disorder Brother     Crohn's disease Maternal Grandfather     Alcohol abuse Paternal Grandfather     Breast cancer Neg Hx     Colon cancer Neg Hx     Ovarian cancer Neg Hx      Social History     Tobacco Use    Smoking status: Passive Smoke Exposure - Never Smoker    Smokeless tobacco: Never   Substance Use Topics    Alcohol use: No    Drug use: No     Review of Systems   Constitutional:  Positive for appetite change (decreased). Negative for fever.   HENT:  Positive for postnasal drip. Negative for congestion, rhinorrhea, sore throat, trouble swallowing and voice change.    Eyes:  Positive for photophobia. Negative for visual disturbance.   Respiratory:  Positive for cough and shortness of breath.    Gastrointestinal:  Positive for diarrhea (chronic), nausea (chronic) and vomiting.   Genitourinary:  Positive for menstrual problem (irregular, heavy, prolonged.  Uses approximately 12 pads daily on some days). Frequency: irregular, prolonged, heavy.  Neurological:  Positive for headaches.       Physical Exam     Initial Vitals [12/15/23 2141]   BP Pulse Resp Temp SpO2   133/88 (!)  124 16 99 °F (37.2 °C) 96 %      MAP       --         Physical Exam    Nursing note and vitals reviewed.  Constitutional: She appears well-developed and well-nourished. She is not diaphoretic. She is Obese . She is cooperative.  Non-toxic appearance. No distress.   HENT:   Head: Normocephalic and atraumatic.   Mouth/Throat: Uvula is midline. Mucous membranes are not pale, not dry and not cyanotic. No trismus in the jaw. No uvula swelling.   Eyes: Conjunctivae are normal.   Neck: Phonation normal. Neck supple. No stridor present.   Normal range of motion.  Cardiovascular:  Normal rate and intact distal pulses.           Pulmonary/Chest: Effort normal and breath sounds normal. No accessory muscle usage or stridor. No tachypnea. No respiratory distress. She has no decreased breath sounds. She has no wheezes. She has no rhonchi. She has no rales.   Abdominal: Abdomen is soft. She exhibits no distension. There is no abdominal tenderness. There is no rebound and no guarding.   Musculoskeletal:         General: No tenderness or edema. Normal range of motion.      Cervical back: Normal range of motion and neck supple.     Neurological: She is alert and oriented to person, place, and time. She has normal strength. Gait normal. GCS eye subscore is 4. GCS verbal subscore is 5. GCS motor subscore is 6.   Skin: Skin is warm. Capillary refill takes less than 2 seconds. No rash noted.   Psychiatric: She has a normal mood and affect.         ED Course   Procedures  Labs Reviewed   POCT URINALYSIS W/O SCOPE - Abnormal; Notable for the following components:       Result Value    Blood, UA 3+ (*)     Protein, UA 2+ (*)     All other components within normal limits   POCT URINE PREGNANCY          Imaging Results              X-Ray Chest PA And Lateral (Final result)  Result time 12/15/23 22:52:46      Final result by Farrah Henley MD (12/15/23 22:52:46)                   Impression:      No radiographic evidence of acute  intra-thoracic process.      Electronically signed by: Farrah Henley MD  Date:    12/15/2023  Time:    22:52               Narrative:    EXAMINATION:  XR CHEST PA AND LATERAL    CLINICAL HISTORY:  Cough, unspecified    TECHNIQUE:  PA and lateral views of the chest were performed.    COMPARISON:  11/30/2022    FINDINGS:  Soft tissues of the patient's arms project over lateral view obscuring some detail the retrosternal airspace and mediastinal margins.  Stably positioned cardiac loop recorder projects over the left hemithorax.  The cardiomediastinal silhouette is within normal limits.  Mediastinal structures are midline.  The lungs appear symmetrically aerated without definite focal alveolar consolidation. No large pleural effusion or pneumothorax is appreciated.Visualized osseous structures are intact.                                       Medications   dexAMETHasone injection 12 mg (12 mg Intramuscular Given 12/15/23 2244)   butalbital-acetaminophen-caffeine -40 mg per tablet 1 tablet (1 tablet Oral Given 12/15/23 2243)   promethazine tablet 25 mg (25 mg Oral Given 12/15/23 2243)     Medical Decision Making  Amount and/or Complexity of Data Reviewed  Labs: ordered.  Radiology: ordered.    Risk  OTC drugs.  Prescription drug management.                        Labs Reviewed    Admission on 12/15/2023, Discharged on 12/15/2023   Component Date Value Ref Range Status    POC Preg Test, Ur 12/15/2023 Negative  Negative Final     Acceptable 12/15/2023 Yes   Final    Glucose, UA 12/15/2023 Negative   Final    Bilirubin, UA 12/15/2023 Negative   Final    Ketones, UA 12/15/2023 Negative   Final    Spec Grav UA 12/15/2023 1.015   Final    Blood, UA 12/15/2023 3+ (A)   Final    PH, UA 12/15/2023 8.5   Final    Protein, UA 12/15/2023 2+ (A)   Final    Urobilinogen, UA 12/15/2023 1.0  E.U./dL Final    Nitrite, UA 12/15/2023 Negative   Final    Leukocytes, UA 12/15/2023 Negative   Final    Color, UA 12/15/2023  Yellow   Final    Clarity, UA 12/15/2023 Clear   Final        Imaging Reviewed    Imaging Results              X-Ray Chest PA And Lateral (Final result)  Result time 12/15/23 22:52:46      Final result by Farrah Henley MD (12/15/23 22:52:46)                   Impression:      No radiographic evidence of acute intra-thoracic process.      Electronically signed by: Farrah Henley MD  Date:    12/15/2023  Time:    22:52               Narrative:    EXAMINATION:  XR CHEST PA AND LATERAL    CLINICAL HISTORY:  Cough, unspecified    TECHNIQUE:  PA and lateral views of the chest were performed.    COMPARISON:  11/30/2022    FINDINGS:  Soft tissues of the patient's arms project over lateral view obscuring some detail the retrosternal airspace and mediastinal margins.  Stably positioned cardiac loop recorder projects over the left hemithorax.  The cardiomediastinal silhouette is within normal limits.  Mediastinal structures are midline.  The lungs appear symmetrically aerated without definite focal alveolar consolidation. No large pleural effusion or pneumothorax is appreciated.Visualized osseous structures are intact.                                      Medications given in ED    Medications   dexAMETHasone injection 12 mg (12 mg Intramuscular Given 12/15/23 2244)   butalbital-acetaminophen-caffeine -40 mg per tablet 1 tablet (1 tablet Oral Given 12/15/23 2243)   promethazine tablet 25 mg (25 mg Oral Given 12/15/23 2243)       Note was created using voice recognition software. Note may have occasional typographical errors that may not have been identified and edited despite good pat initial review prior to signing.    Medical Decision Making:   Differential Diagnosis:   COVID 19 infection, influenza infection, strep pharyngitis, acute otitis media, sinusitis, tonsillitis, rhinosinusitis, bronchiolitis, bronchitis, other viral illness, and others    Clinical Tests:   Lab Tests: Reviewed and Ordered  Radiological  Study: Ordered and Reviewed             Clinical Impression:  Final diagnoses:  [R05.9] Cough  [J06.9] URI with cough and congestion (Primary)          ED Disposition Condition    Discharge Stable          ED Prescriptions       Medication Sig Dispense Start Date End Date Auth. Provider    montelukast (SINGULAIR) 10 mg tablet () Take 1 tablet (10 mg total) by mouth every evening. 30 tablet 12/15/2023 2024 Jory Brennan MD    predniSONE (DELTASONE) 20 MG tablet () Take 2 tablets (40 mg total) by mouth once daily. for 5 days 10 tablet 12/15/2023 2023 Jory Brennan MD    azelastine (ASTELIN) 137 mcg (0.1 %) nasal spray () 1 spray (137 mcg total) by Nasal route 2 (two) times daily. 30 mL 12/15/2023 2024 Jory Brennan MD    benzonatate (TESSALON) 100 MG capsule () Take 1 capsule (100 mg total) by mouth 3 (three) times daily as needed for Cough. 20 capsule 12/15/2023 2023 Jory Brennan MD    loratadine (CLARITIN) 10 mg tablet Take 1 tablet (10 mg total) by mouth every morning. 60 tablet 12/15/2023 2024 Jory Brennan MD    promethazine (PHENERGAN) 25 MG tablet Take 1 tablet (25 mg total) by mouth every 6 (six) hours as needed for Nausea. 15 tablet 12/15/2023 -- Jory Brennan MD    promethazine (PHENERGAN) 25 MG suppository Place 1 suppository (25 mg total) rectally every 6 (six) hours as needed for Nausea. 10 suppository 12/15/2023 -- Jory Brennan MD          Follow-up Information       Follow up With Specialties Details Why Contact Info    The nearest emergency department.  Go to  As needed, If symptoms worsen     Your PCP  On 2023 to schedule an appointment, for re-evaluation of today's complaint, and ongoing care              Jory Brennan MD  [phone]

## 2024-05-29 PROCEDURE — 99285 EMERGENCY DEPT VISIT HI MDM: CPT | Mod: 25

## 2024-05-29 PROCEDURE — 93010 ELECTROCARDIOGRAM REPORT: CPT | Mod: ,,, | Performed by: INTERNAL MEDICINE

## 2024-05-29 PROCEDURE — 87635 SARS-COV-2 COVID-19 AMP PRB: CPT | Performed by: EMERGENCY MEDICINE

## 2024-05-29 PROCEDURE — 93005 ELECTROCARDIOGRAM TRACING: CPT

## 2024-05-29 PROCEDURE — 81025 URINE PREGNANCY TEST: CPT | Performed by: EMERGENCY MEDICINE

## 2024-05-30 ENCOUNTER — HOSPITAL ENCOUNTER (EMERGENCY)
Facility: HOSPITAL | Age: 24
Discharge: HOME | End: 2024-05-30
Attending: EMERGENCY MEDICINE
Payer: MEDICAID

## 2024-05-30 VITALS
OXYGEN SATURATION: 98 % | WEIGHT: 235 LBS | TEMPERATURE: 100 F | HEART RATE: 126 BPM | BODY MASS INDEX: 44.37 KG/M2 | SYSTOLIC BLOOD PRESSURE: 155 MMHG | HEIGHT: 61 IN | RESPIRATION RATE: 30 BRPM | DIASTOLIC BLOOD PRESSURE: 64 MMHG

## 2024-05-30 DIAGNOSIS — R07.9 CHEST PAIN, UNSPECIFIED TYPE: ICD-10-CM

## 2024-05-30 DIAGNOSIS — R05.9 COUGH: ICD-10-CM

## 2024-05-30 DIAGNOSIS — J20.9 ACUTE BRONCHITIS, UNSPECIFIED ORGANISM: Primary | ICD-10-CM

## 2024-05-30 DIAGNOSIS — R00.2 PALPITATIONS: ICD-10-CM

## 2024-05-30 DIAGNOSIS — R00.0 TACHYCARDIA: ICD-10-CM

## 2024-05-30 DIAGNOSIS — R11.0 NAUSEA: ICD-10-CM

## 2024-05-30 DIAGNOSIS — J02.9 SORE THROAT: ICD-10-CM

## 2024-05-30 DIAGNOSIS — R05.8 PRODUCTIVE COUGH: ICD-10-CM

## 2024-05-30 DIAGNOSIS — E86.0 DEHYDRATION: ICD-10-CM

## 2024-05-30 DIAGNOSIS — R50.9 FEVER, UNSPECIFIED FEVER CAUSE: ICD-10-CM

## 2024-05-30 LAB
ALBUMIN SERPL BCP-MCNC: 3.3 G/DL (ref 3.5–5.2)
ALP SERPL-CCNC: 66 U/L (ref 55–135)
ALT SERPL W/O P-5'-P-CCNC: 23 U/L (ref 10–44)
ANION GAP SERPL CALC-SCNC: 9 MMOL/L (ref 8–16)
AST SERPL-CCNC: 25 U/L (ref 10–40)
B-HCG UR QL: NEGATIVE
BASOPHILS # BLD AUTO: 0.02 K/UL (ref 0–0.2)
BASOPHILS NFR BLD: 0.4 % (ref 0–1.9)
BILIRUB SERPL-MCNC: 0.3 MG/DL (ref 0.1–1)
BILIRUB UR QL STRIP: NEGATIVE
BNP SERPL-MCNC: <10 PG/ML (ref 0–99)
BUN SERPL-MCNC: 5 MG/DL (ref 6–20)
CALCIUM SERPL-MCNC: 9.3 MG/DL (ref 8.7–10.5)
CHLORIDE SERPL-SCNC: 107 MMOL/L (ref 95–110)
CLARITY UR: CLEAR
CO2 SERPL-SCNC: 24 MMOL/L (ref 23–29)
COLOR UR: YELLOW
CREAT SERPL-MCNC: 0.7 MG/DL (ref 0.5–1.4)
CTP QC/QA: YES
D DIMER PPP IA.FEU-MCNC: 0.27 MG/L FEU
DIFFERENTIAL METHOD BLD: ABNORMAL
EOSINOPHIL # BLD AUTO: 0.1 K/UL (ref 0–0.5)
EOSINOPHIL NFR BLD: 1.5 % (ref 0–8)
ERYTHROCYTE [DISTWIDTH] IN BLOOD BY AUTOMATED COUNT: 17.8 % (ref 11.5–14.5)
EST. GFR  (NO RACE VARIABLE): >60 ML/MIN/1.73 M^2
GLUCOSE SERPL-MCNC: 92 MG/DL (ref 70–110)
GLUCOSE UR QL STRIP: NEGATIVE
HCT VFR BLD AUTO: 34.4 % (ref 37–48.5)
HGB BLD-MCNC: 9.8 G/DL (ref 12–16)
HGB UR QL STRIP: NEGATIVE
IMM GRANULOCYTES # BLD AUTO: 0.03 K/UL (ref 0–0.04)
IMM GRANULOCYTES NFR BLD AUTO: 0.5 % (ref 0–0.5)
KETONES UR QL STRIP: NEGATIVE
LACTATE SERPL-SCNC: 1.3 MMOL/L (ref 0.5–2.2)
LEUKOCYTE ESTERASE UR QL STRIP: NEGATIVE
LYMPHOCYTES # BLD AUTO: 0.7 K/UL (ref 1–4.8)
LYMPHOCYTES NFR BLD: 12.5 % (ref 18–48)
MAGNESIUM SERPL-MCNC: 1.8 MG/DL (ref 1.6–2.6)
MCH RBC QN AUTO: 20.7 PG (ref 27–31)
MCHC RBC AUTO-ENTMCNC: 28.5 G/DL (ref 32–36)
MCV RBC AUTO: 73 FL (ref 82–98)
MONOCYTES # BLD AUTO: 0.5 K/UL (ref 0.3–1)
MONOCYTES NFR BLD: 8.7 % (ref 4–15)
NEUTROPHILS # BLD AUTO: 4.2 K/UL (ref 1.8–7.7)
NEUTROPHILS NFR BLD: 76.4 % (ref 38–73)
NITRITE UR QL STRIP: NEGATIVE
NRBC BLD-RTO: 0 /100 WBC
PH UR STRIP: 6 [PH] (ref 5–8)
PLATELET # BLD AUTO: 241 K/UL (ref 150–450)
PMV BLD AUTO: 10.5 FL (ref 9.2–12.9)
POC MOLECULAR INFLUENZA A AGN: NEGATIVE
POC MOLECULAR INFLUENZA B AGN: NEGATIVE
POTASSIUM SERPL-SCNC: 4 MMOL/L (ref 3.5–5.1)
PROCALCITONIN SERPL IA-MCNC: 0.06 NG/ML
PROT SERPL-MCNC: 6.9 G/DL (ref 6–8.4)
PROT UR QL STRIP: NEGATIVE
RBC # BLD AUTO: 4.73 M/UL (ref 4–5.4)
SARS-COV-2 RDRP RESP QL NAA+PROBE: NEGATIVE
SODIUM SERPL-SCNC: 140 MMOL/L (ref 136–145)
SP GR UR STRIP: 1.01 (ref 1–1.03)
TROPONIN I SERPL DL<=0.01 NG/ML-MCNC: 0.01 NG/ML (ref 0–0.03)
TSH SERPL DL<=0.005 MIU/L-ACNC: 1.33 UIU/ML (ref 0.4–4)
URN SPEC COLLECT METH UR: NORMAL
UROBILINOGEN UR STRIP-ACNC: NEGATIVE EU/DL
WBC # BLD AUTO: 5.51 K/UL (ref 3.9–12.7)

## 2024-05-30 PROCEDURE — 84443 ASSAY THYROID STIM HORMONE: CPT | Performed by: EMERGENCY MEDICINE

## 2024-05-30 PROCEDURE — 83605 ASSAY OF LACTIC ACID: CPT | Performed by: EMERGENCY MEDICINE

## 2024-05-30 PROCEDURE — 81003 URINALYSIS AUTO W/O SCOPE: CPT | Performed by: EMERGENCY MEDICINE

## 2024-05-30 PROCEDURE — 83880 ASSAY OF NATRIURETIC PEPTIDE: CPT | Performed by: EMERGENCY MEDICINE

## 2024-05-30 PROCEDURE — 87040 BLOOD CULTURE FOR BACTERIA: CPT | Performed by: EMERGENCY MEDICINE

## 2024-05-30 PROCEDURE — 84145 PROCALCITONIN (PCT): CPT | Performed by: EMERGENCY MEDICINE

## 2024-05-30 PROCEDURE — 85025 COMPLETE CBC W/AUTO DIFF WBC: CPT | Performed by: EMERGENCY MEDICINE

## 2024-05-30 PROCEDURE — 87502 INFLUENZA DNA AMP PROBE: CPT

## 2024-05-30 PROCEDURE — 25000003 PHARM REV CODE 250: Performed by: EMERGENCY MEDICINE

## 2024-05-30 PROCEDURE — 94644 CONT INHLJ TX 1ST HOUR: CPT

## 2024-05-30 PROCEDURE — 96361 HYDRATE IV INFUSION ADD-ON: CPT

## 2024-05-30 PROCEDURE — 96365 THER/PROPH/DIAG IV INF INIT: CPT

## 2024-05-30 PROCEDURE — 63600175 PHARM REV CODE 636 W HCPCS: Performed by: EMERGENCY MEDICINE

## 2024-05-30 PROCEDURE — 96367 TX/PROPH/DG ADDL SEQ IV INF: CPT

## 2024-05-30 PROCEDURE — 85379 FIBRIN DEGRADATION QUANT: CPT | Performed by: EMERGENCY MEDICINE

## 2024-05-30 PROCEDURE — 80053 COMPREHEN METABOLIC PANEL: CPT | Performed by: EMERGENCY MEDICINE

## 2024-05-30 PROCEDURE — 84484 ASSAY OF TROPONIN QUANT: CPT | Performed by: EMERGENCY MEDICINE

## 2024-05-30 PROCEDURE — 94761 N-INVAS EAR/PLS OXIMETRY MLT: CPT

## 2024-05-30 PROCEDURE — 83735 ASSAY OF MAGNESIUM: CPT | Performed by: EMERGENCY MEDICINE

## 2024-05-30 PROCEDURE — 25000242 PHARM REV CODE 250 ALT 637 W/ HCPCS: Performed by: EMERGENCY MEDICINE

## 2024-05-30 RX ORDER — PROMETHAZINE HYDROCHLORIDE 25 MG/1
25 TABLET ORAL EVERY 4 HOURS PRN
Qty: 15 TABLET | Refills: 0 | Status: SHIPPED | OUTPATIENT
Start: 2024-05-30

## 2024-05-30 RX ORDER — ALBUTEROL SULFATE 2.5 MG/.5ML
2.5 SOLUTION RESPIRATORY (INHALATION) EVERY 4 HOURS PRN
Qty: 30 EACH | Refills: 3 | Status: SHIPPED | OUTPATIENT
Start: 2024-05-30 | End: 2025-05-30

## 2024-05-30 RX ORDER — ACETAMINOPHEN 500 MG
1000 TABLET ORAL
Status: COMPLETED | OUTPATIENT
Start: 2024-05-30 | End: 2024-05-30

## 2024-05-30 RX ORDER — ALBUTEROL SULFATE 2.5 MG/.5ML
10 SOLUTION RESPIRATORY (INHALATION)
Status: COMPLETED | OUTPATIENT
Start: 2024-05-30 | End: 2024-05-30

## 2024-05-30 RX ORDER — AZITHROMYCIN 250 MG/1
TABLET, FILM COATED ORAL
Qty: 6 TABLET | Refills: 0 | Status: SHIPPED | OUTPATIENT
Start: 2024-05-30

## 2024-05-30 RX ORDER — IBUPROFEN 400 MG/1
800 TABLET ORAL
Status: DISCONTINUED | OUTPATIENT
Start: 2024-05-30 | End: 2024-05-30

## 2024-05-30 RX ORDER — ALBUTEROL SULFATE 90 UG/1
2 AEROSOL, METERED RESPIRATORY (INHALATION) EVERY 4 HOURS PRN
Qty: 18 G | Refills: 3 | Status: SHIPPED | OUTPATIENT
Start: 2024-05-30 | End: 2025-05-30

## 2024-05-30 RX ORDER — PROMETHAZINE HYDROCHLORIDE 25 MG/1
25 SUPPOSITORY RECTAL EVERY 6 HOURS PRN
Qty: 6 SUPPOSITORY | Refills: 0 | Status: SHIPPED | OUTPATIENT
Start: 2024-05-30

## 2024-05-30 RX ADMIN — CEFTRIAXONE 1 G: 1 INJECTION, POWDER, FOR SOLUTION INTRAMUSCULAR; INTRAVENOUS at 03:05

## 2024-05-30 RX ADMIN — PROMETHAZINE HYDROCHLORIDE 25 MG: 25 INJECTION INTRAMUSCULAR; INTRAVENOUS at 02:05

## 2024-05-30 RX ADMIN — ACETAMINOPHEN 1000 MG: 500 TABLET ORAL at 03:05

## 2024-05-30 RX ADMIN — AZITHROMYCIN 500 MG: 500 INJECTION, POWDER, LYOPHILIZED, FOR SOLUTION INTRAVENOUS at 04:05

## 2024-05-30 RX ADMIN — ALBUTEROL SULFATE 10 MG: 2.5 SOLUTION RESPIRATORY (INHALATION) at 02:05

## 2024-05-30 RX ADMIN — SODIUM CHLORIDE 3198 ML: 9 INJECTION, SOLUTION INTRAVENOUS at 02:05

## 2024-05-30 NOTE — Clinical Note
"Eliu Ferreiraloretta Hunter was seen and treated in our emergency department on 5/29/2024.  She may return to work on 06/03/2024.       If you have any questions or concerns, please don't hesitate to call.      Jody Phillips MD"

## 2024-05-30 NOTE — Clinical Note
"Eliu Ferreiraloretta Hunter was seen and treated in our emergency department on 5/29/2024.  She may return to work on 06/03/2024.       If you have any questions or concerns, please don't hesitate to call.      Jody Phillips MD" 31-Jan-2020 12:01

## 2024-05-30 NOTE — ED PROVIDER NOTES
"Encounter Date: 5/29/2024       History     Chief Complaint   Patient presents with    Cough    Nausea    Vomiting    Headache     Pt presents to ED c/o non productive cough, sore throat, n/v, post nasal drip and headache onset x 1 month.  Pt reports being seen at  and prescribed zyrtec and  cough medication with no relief.  Pt also c/o cp, intermittent, described as tightness.  Pain 3/10.      24-year-old female with asthma presents via significant other to [facility] ER with cough and palpitations.  Patient states she has had constant frontal headaches for the last month, minimally relieved with the Tylenol.  She also has had a sore throat "like I swallowed razor blades" for the last month as well.  About 2 weeks ago, she developed a cough productive of dark green sputum.  Patient went to an urgent care 2 days ago (Monday 5/27/24) and was told she was recovering from bronchitis.  She was prescribed promethazine-DM cough syrup, a nasal spray she could not afford, and Zyrtec.  However, patient continued to feel ill.  Tonight (Wednesday 5/29/24) around 11:00 p.m., patient woke from sleep with her heart racing as well as stabbing/tight 3/10 left-sided chest pain which radiates to her left upper extremity.  She also reports subjective fevers today.  Here, her maximum temperature is a 100.4° F.  No sick contacts.  Patient reports nausea especially for the last 3 days.  No vomiting.    Patient has recently been unemployed as she previously worked at the [employer], but it is the slow season.  Today, she just started a new job at [employer], but noted that she was feeling lightheaded and dizzy throughout her 8 hour shift.      Patient reports irregular menses due to endometriosis and PCOS.  She has not been on birth control for 4 years.  Her last menstrual cycle was around 4/1/24.    Patient has an albuterol inhaler which she uses as needed.        Review of patient's allergies indicates:   Allergen Reactions "    Advair diskus [fluticasone propion-salmeterol] Anaphylaxis    Aspartame Anaphylaxis     Other reaction(s): UNKNOWN    Bupropion Anaphylaxis    Chloral hydrate analogues Other (See Comments)     Adverse reaction per grandma    Imitrex [sumatriptan succinate] Anaphylaxis    Ugo Anaphylaxis    Metoprolol Shortness Of Breath    Nitroglycerin Anaphylaxis     Other reaction(s): stop breathing    Quetiapine Hallucinations     Other reaction(s): HALLUCINATION    Reglan [metoclopramide hcl] Shortness Of Breath    Tomato (solanum lycopersicum) Anaphylaxis and Shortness Of Breath    Unable to assess Anaphylaxis     Crabs/not allergic to iodine    Zantac [ranitidine hcl] Shortness Of Breath    Hibiclens (isopropyl alcohol) Itching    Abilify [aripiprazole]     Ambien [zolpidem]     Banana      Other reaction(s): STOP BREATHING    Chloral hydrate      Other reaction(s): adverse affect    Compazine [prochlorperazine edisylate]     Crab      Other reaction(s): STOP BREATHING    Desmopressin     Eggplant      Other reaction(s): EGG PLANT    Honey      Other reaction(s): LIP SWELLING    Ketorolac      Other reaction(s): RASH    Kiwi (actinidia chinensis)     Lexapro [escitalopram oxalate]     Meclizine      hives    Metoclopramide      Other reaction(s): SHORTNESS OF BREATH    Ondansetron      Other reaction(s): RASH    Prochlorperazine      Other reaction(s): RASH    Remeron [mirtazapine]     Sucralose      Other reaction(s): STOPS BREATHING    Tomato      Other reaction(s): stop breathing    Xanax [alprazolam]     Zofran [ondansetron hcl (pf)]     Mayonnaise Rash     Other reaction(s): STOP BREATHING    Sulfa (sulfonamide antibiotics) Rash and Nausea And Vomiting     Past Medical History:   Diagnosis Date    Asthma     Brain tumor, pineal gland     Depression     Endometriosis     Hypertension     Precocious puberty     Seizures     Syncope, cardiogenic     Thyroid disease      Past Surgical History:   Procedure Laterality  Date     SECTION      COLONOSCOPY W/ BIOPSIES      ESOPHAGOGASTRODUODENOSCOPY      implantable cardiac monitor placement  2018    TONSILLECTOMY      TYMPANOSTOMY TUBE PLACEMENT       Family History   Problem Relation Name Age of Onset    Heart disease Mother      Hyperlipidemia Mother      Asthma Mother      Crohn's disease Mother  37        pending surgery. prior med; sulfasalazine    Depression Mother      Bipolar disorder Mother      Arthritis Mother      Anxiety disorder Mother      Other Mother          PTSD    Hypertension Maternal Grandmother      Asthma Maternal Grandmother      Depression Maternal Grandmother      Anxiety disorder Maternal Grandmother      Hypertension Paternal Grandmother      Diabetes Paternal Grandmother      Nephrolithiasis Father      Nephrolithiasis Brother      ADD / ADHD Brother      Depression Brother      Bipolar disorder Brother      Crohn's disease Maternal Grandfather      Alcohol abuse Paternal Grandfather      Breast cancer Neg Hx      Colon cancer Neg Hx      Ovarian cancer Neg Hx       Social History     Tobacco Use    Smoking status: Passive Smoke Exposure - Never Smoker    Smokeless tobacco: Never   Substance Use Topics    Alcohol use: No    Drug use: No     Review of Systems   Constitutional:  Positive for chills, diaphoresis and fever.   HENT:  Positive for rhinorrhea and sore throat.    Eyes:  Negative for visual disturbance.   Respiratory:  Positive for cough and shortness of breath.    Cardiovascular:  Positive for chest pain and palpitations.   Gastrointestinal:  Positive for nausea.   Genitourinary:  Negative for dysuria.   Musculoskeletal:  Negative for gait problem.   Skin:  Negative for rash.   Neurological:  Positive for dizziness and headaches. Negative for syncope.       Physical Exam     Initial Vitals [24 2338]   BP Pulse Resp Temp SpO2   (!) 178/94 (!) 120 20 100.1 °F (37.8 °C) 99 %      MAP       --         Physical Exam    Nursing  note and vitals reviewed.  Constitutional: She appears well-developed and well-nourished. She is diaphoretic (faintly).   Awake, alert young adult female. Body mass index is 44.4 kg/m². Skin hot to touch. Frequent cough.    HENT:   Head: Normocephalic and atraumatic.   Mouth/Throat: Oropharynx is clear and moist.   No oropharyngeal redness or exudate.   Eyes: Conjunctivae and EOM are normal. Pupils are equal, round, and reactive to light.   Neck: Neck supple.   Normal range of motion.  Cardiovascular:  Regular rhythm and intact distal pulses.           Tachycardic, regular.   Pulmonary/Chest: No respiratory distress. She has no wheezes. She has no rhonchi. She has no rales.   Intermittent cough. Only wheezes on coughing.    Abdominal: Abdomen is soft. There is no abdominal tenderness.   Musculoskeletal:         General: No tenderness or edema. Normal range of motion.      Cervical back: Normal range of motion and neck supple.     Neurological: She is alert and oriented to person, place, and time.   Moving all extremities.   Skin: Skin is warm. No erythema. No pallor.   Psychiatric: She has a normal mood and affect.         ED Course   Procedures  Labs Reviewed   CBC W/ AUTO DIFFERENTIAL - Abnormal; Notable for the following components:       Result Value    Hemoglobin 9.8 (*)     Hematocrit 34.4 (*)     MCV 73 (*)     MCH 20.7 (*)     MCHC 28.5 (*)     RDW 17.8 (*)     Lymph # 0.7 (*)     Gran % 76.4 (*)     Lymph % 12.5 (*)     All other components within normal limits   COMPREHENSIVE METABOLIC PANEL - Abnormal; Notable for the following components:    BUN 5 (*)     Albumin 3.3 (*)     All other components within normal limits   CULTURE, BLOOD    Narrative:     Aerobic and anaerobic   CULTURE, BLOOD    Narrative:     Aerobic and anaerobic   URINALYSIS, REFLEX TO URINE CULTURE    Narrative:     Specimen Source->Urine   LACTIC ACID, PLASMA   MAGNESIUM   B-TYPE NATRIURETIC PEPTIDE   TROPONIN I   PROCALCITONIN   D  DIMER, QUANTITATIVE   TSH   TSH   POCT URINE PREGNANCY   SARS-COV-2 RDRP GENE   POCT INFLUENZA A/B MOLECULAR     EKG Readings: (Independently Interpreted)   23:48: Sinus tach, . Normal axis. TWI III. No ectopy. No STEMI.      ECG Results              EKG 12-lead (Final result)  Result time 05/30/24 11:14:44      Final result by Unknown User (05/30/24 11:14:44)                                      Imaging Results              X-Ray Chest PA And Lateral (Final result)  Result time 05/30/24 00:51:16      Final result by Shaun Erwin MD (05/30/24 00:51:16)                   Impression:      No acute cardiopulmonary process identified.      Electronically signed by: Shaun Erwin MD  Date:    05/30/2024  Time:    00:51               Narrative:    EXAMINATION:  XR CHEST PA AND LATERAL    CLINICAL HISTORY:  Cough, unspecified    TECHNIQUE:  PA and lateral views of the chest were performed.    COMPARISON:  December 2023.    FINDINGS:  Cardiac silhouette is normal in size.  Loop recorder device is noted.  Lungs are symmetrically expanded.  No evidence of focal consolidative process, pneumothorax, or significant pleural effusion.  No acute osseous abnormality identified.                                    X-Rays:   Independently Interpreted Readings:   Other Readings:  CXR NAD    Medications   sodium chloride 0.9% bolus 3,198 mL 3,198 mL (0 mLs Intravenous Stopped 5/30/24 0326)   albuterol sulfate nebulizer solution 10 mg (10 mg Nebulization Given 5/30/24 0224)   promethazine (PHENERGAN) 25 mg in dextrose 5 % (D5W) 50 mL IVPB (0 mg Intravenous Stopped 5/30/24 0300)   acetaminophen tablet 1,000 mg (1,000 mg Oral Given 5/30/24 0311)   azithromycin (ZITHROMAX) 500 mg in dextrose 5 % (D5W) 250 mL IVPB (Vial-Mate) (0 mg Intravenous Stopped 5/30/24 0514)   cefTRIAXone (Rocephin) 1 g in dextrose 5 % in water (D5W) 100 mL IVPB (MB+) (0 g Intravenous Stopped 5/30/24 0406)     Medical Decision Making  24-year-old female  with headaches, sore throat, productive cough, chest pain, palpitations, fevers and chills, and lightheadedness.    Ddx includes dehydration, YOVANA, symptomatic anemia, ACS, CHF, PE, viral URI such as influenza or COVID-19, bronchitis, PNA, other.    UPT negative.    EKG no STEMI.      CXR NAD.    Flu and COVID-19 negative.    Labs: WBC 5.51, L shift, no bands.  Hgb 9.8; patient has history of anemia.  Lactate and procalcitonin normal -- I doubt sepsis.  Ddimer negative -- unlikely PE.  Troponin normal -- no ACS / cardiac strain.      Patient did not meet sepsis criteria at triage, but later was noted to be febrile (Tmax in ER 102F), so she was treated at sepsis.  She received IV NS 30mL/kg bolus.  She received IV rocephin 1g and IV azithromycin 500mg for CAP/bronchitis tx.  She received nebulized albuterol 10mg.  She received PO APAP 1g (allergic to NSAIDs).  She received IV promethazine 25mg for nausea.    Patient appeared improved after ER tx.  Temp had reduced.  HR remained in 120s (down from 140s) but per review of prior charts patient is frequently tachycardic.  No hypoxia.    I have rx'ed azithromycin for home.  I have also refilled albuterol MDI and albuterol for home nebulizer.  I have also rx'ed PRN promethazine.      Work note provided.  D/c'ed with family member, ambulatory in no acute distress.      Amount and/or Complexity of Data Reviewed  Labs: ordered.  Radiology: ordered.  ECG/medicine tests: ordered.    Risk  OTC drugs.  Prescription drug management.                                      Clinical Impression:  Final diagnoses:  [R00.0] Tachycardia  [R05.9] Cough  [J20.9] Acute bronchitis, unspecified organism (Primary)  [J02.9] Sore throat  [R05.8] Productive cough  [R00.2] Palpitations  [R07.9] Chest pain, unspecified type  [R50.9] Fever, unspecified fever cause  [R11.0] Nausea  [E86.0] Dehydration          ED Disposition Condition    Discharge           ED Prescriptions       Medication Sig Dispense  Start Date End Date Auth. Provider    promethazine (PHENERGAN) 25 MG tablet Take 1 tablet (25 mg total) by mouth every 4 (four) hours as needed for Nausea. 15 tablet 5/30/2024 -- Jody Phillips MD    promethazine (PHENERGAN) 25 MG suppository Place 1 suppository (25 mg total) rectally every 6 (six) hours as needed for Nausea. 6 suppository 5/30/2024 -- Jody Phillips MD    albuterol (PROVENTIL/VENTOLIN HFA) 90 mcg/actuation inhaler Inhale 2 puffs into the lungs every 4 (four) hours as needed for Wheezing or Shortness of Breath. Rescue 18 g 5/30/2024 5/30/2025 Jody Phillips MD    albuterol sulfate 2.5 mg/0.5 mL Nebu Take 2.5 mg by nebulization every 4 (four) hours as needed (shortness of breath, cough, wheeze). Rescue 30 each 5/30/2024 5/30/2025 Jody Phillips MD    azithromycin (Z-JARVIS) 250 MG tablet Take 2 tablets on first day, then 1 tablet a day thereafter until all tablets are done. 6 tablet 5/30/2024 -- Jody Phillips MD          Follow-up Information       Follow up With Specialties Details Why Contact Info    Idris Carreon MD Family Medicine  As needed [address] [facility]  [place] [address]  [phone]      [facility] -    [address] [place] [address]  [phone]               Jody Phillips MD  05/30/24 2057

## 2024-05-30 NOTE — Clinical Note
"Eliu Ferreirafermin" Dale was seen and treated in our emergency department on 5/29/2024.  She may return to work on 06/06/2024.       If you have any questions or concerns, please don't hesitate to call.      KASHMIR Cardoza RN    "

## 2024-06-01 LAB
OHS QRS DURATION: 76 MS
OHS QTC CALCULATION: 438 MS

## 2024-06-02 ENCOUNTER — HOSPITAL ENCOUNTER (EMERGENCY)
Facility: HOSPITAL | Age: 24
Discharge: HOME | End: 2024-06-02
Attending: EMERGENCY MEDICINE
Payer: MEDICAID

## 2024-06-02 VITALS
RESPIRATION RATE: 20 BRPM | HEIGHT: 61 IN | SYSTOLIC BLOOD PRESSURE: 123 MMHG | HEART RATE: 96 BPM | WEIGHT: 235 LBS | BODY MASS INDEX: 44.37 KG/M2 | OXYGEN SATURATION: 95 % | DIASTOLIC BLOOD PRESSURE: 63 MMHG | TEMPERATURE: 99 F

## 2024-06-02 DIAGNOSIS — R06.02 SHORTNESS OF BREATH: ICD-10-CM

## 2024-06-02 DIAGNOSIS — J18.9 PNEUMONIA OF RIGHT LOWER LOBE DUE TO INFECTIOUS ORGANISM: Primary | ICD-10-CM

## 2024-06-02 DIAGNOSIS — R50.9 FEVER: ICD-10-CM

## 2024-06-02 DIAGNOSIS — E87.6 HYPOKALEMIA: ICD-10-CM

## 2024-06-02 LAB
ALBUMIN SERPL BCP-MCNC: 3.4 G/DL (ref 3.5–5.2)
ALP SERPL-CCNC: 56 U/L (ref 55–135)
ALT SERPL W/O P-5'-P-CCNC: 24 U/L (ref 10–44)
ANION GAP SERPL CALC-SCNC: 14 MMOL/L (ref 8–16)
AST SERPL-CCNC: 29 U/L (ref 10–40)
B-HCG UR QL: NEGATIVE
BASOPHILS # BLD AUTO: 0.02 K/UL (ref 0–0.2)
BASOPHILS NFR BLD: 0.5 % (ref 0–1.9)
BILIRUB SERPL-MCNC: 0.4 MG/DL (ref 0.1–1)
BNP SERPL-MCNC: <10 PG/ML (ref 0–99)
BUN SERPL-MCNC: 6 MG/DL (ref 6–20)
CALCIUM SERPL-MCNC: 9.2 MG/DL (ref 8.7–10.5)
CHLORIDE SERPL-SCNC: 105 MMOL/L (ref 95–110)
CO2 SERPL-SCNC: 21 MMOL/L (ref 23–29)
CREAT SERPL-MCNC: 0.8 MG/DL (ref 0.5–1.4)
CTP QC/QA: YES
D DIMER PPP IA.FEU-MCNC: 0.97 MG/L FEU
DIFFERENTIAL METHOD BLD: ABNORMAL
EOSINOPHIL # BLD AUTO: 0.1 K/UL (ref 0–0.5)
EOSINOPHIL NFR BLD: 2.9 % (ref 0–8)
ERYTHROCYTE [DISTWIDTH] IN BLOOD BY AUTOMATED COUNT: 18.2 % (ref 11.5–14.5)
EST. GFR  (NO RACE VARIABLE): >60 ML/MIN/1.73 M^2
GLUCOSE SERPL-MCNC: 121 MG/DL (ref 70–110)
HCT VFR BLD AUTO: 36.3 % (ref 37–48.5)
HGB BLD-MCNC: 10.5 G/DL (ref 12–16)
IMM GRANULOCYTES # BLD AUTO: 0.01 K/UL (ref 0–0.04)
IMM GRANULOCYTES NFR BLD AUTO: 0.3 % (ref 0–0.5)
LYMPHOCYTES # BLD AUTO: 1.2 K/UL (ref 1–4.8)
LYMPHOCYTES NFR BLD: 32.4 % (ref 18–48)
MCH RBC QN AUTO: 20.9 PG (ref 27–31)
MCHC RBC AUTO-ENTMCNC: 28.9 G/DL (ref 32–36)
MCV RBC AUTO: 72 FL (ref 82–98)
MONOCYTES # BLD AUTO: 0.3 K/UL (ref 0.3–1)
MONOCYTES NFR BLD: 8.8 % (ref 4–15)
NEUTROPHILS # BLD AUTO: 2.1 K/UL (ref 1.8–7.7)
NEUTROPHILS NFR BLD: 55.1 % (ref 38–73)
NRBC BLD-RTO: 0 /100 WBC
PLATELET # BLD AUTO: 217 K/UL (ref 150–450)
PMV BLD AUTO: 10.9 FL (ref 9.2–12.9)
POTASSIUM SERPL-SCNC: 3.3 MMOL/L (ref 3.5–5.1)
PROT SERPL-MCNC: 7.5 G/DL (ref 6–8.4)
RBC # BLD AUTO: 5.03 M/UL (ref 4–5.4)
SODIUM SERPL-SCNC: 140 MMOL/L (ref 136–145)
WBC # BLD AUTO: 3.77 K/UL (ref 3.9–12.7)

## 2024-06-02 PROCEDURE — 25000003 PHARM REV CODE 250: Performed by: EMERGENCY MEDICINE

## 2024-06-02 PROCEDURE — 93010 ELECTROCARDIOGRAM REPORT: CPT | Mod: ,,, | Performed by: INTERNAL MEDICINE

## 2024-06-02 PROCEDURE — 96361 HYDRATE IV INFUSION ADD-ON: CPT

## 2024-06-02 PROCEDURE — 94640 AIRWAY INHALATION TREATMENT: CPT

## 2024-06-02 PROCEDURE — 25000242 PHARM REV CODE 250 ALT 637 W/ HCPCS: Performed by: EMERGENCY MEDICINE

## 2024-06-02 PROCEDURE — 81025 URINE PREGNANCY TEST: CPT | Performed by: EMERGENCY MEDICINE

## 2024-06-02 PROCEDURE — 83880 ASSAY OF NATRIURETIC PEPTIDE: CPT | Performed by: EMERGENCY MEDICINE

## 2024-06-02 PROCEDURE — 96367 TX/PROPH/DG ADDL SEQ IV INF: CPT

## 2024-06-02 PROCEDURE — 25500020 PHARM REV CODE 255: Performed by: EMERGENCY MEDICINE

## 2024-06-02 PROCEDURE — 85025 COMPLETE CBC W/AUTO DIFF WBC: CPT | Performed by: EMERGENCY MEDICINE

## 2024-06-02 PROCEDURE — 80053 COMPREHEN METABOLIC PANEL: CPT | Performed by: EMERGENCY MEDICINE

## 2024-06-02 PROCEDURE — 99285 EMERGENCY DEPT VISIT HI MDM: CPT | Mod: 25

## 2024-06-02 PROCEDURE — 93005 ELECTROCARDIOGRAM TRACING: CPT

## 2024-06-02 PROCEDURE — 85379 FIBRIN DEGRADATION QUANT: CPT | Performed by: EMERGENCY MEDICINE

## 2024-06-02 PROCEDURE — 63600175 PHARM REV CODE 636 W HCPCS: Performed by: EMERGENCY MEDICINE

## 2024-06-02 PROCEDURE — 96365 THER/PROPH/DIAG IV INF INIT: CPT | Mod: 59

## 2024-06-02 PROCEDURE — 25000003 PHARM REV CODE 250: Performed by: PHYSICIAN ASSISTANT

## 2024-06-02 RX ORDER — POTASSIUM CHLORIDE 7.45 MG/ML
10 INJECTION INTRAVENOUS ONCE
Status: COMPLETED | OUTPATIENT
Start: 2024-06-02 | End: 2024-06-02

## 2024-06-02 RX ORDER — AMOXICILLIN AND CLAVULANATE POTASSIUM 875; 125 MG/1; MG/1
1 TABLET, FILM COATED ORAL 2 TIMES DAILY
Qty: 14 TABLET | Refills: 0 | Status: SHIPPED | OUTPATIENT
Start: 2024-06-02

## 2024-06-02 RX ORDER — ACETAMINOPHEN 500 MG
1000 TABLET ORAL
Status: COMPLETED | OUTPATIENT
Start: 2024-06-02 | End: 2024-06-02

## 2024-06-02 RX ORDER — ALBUTEROL SULFATE 2.5 MG/.5ML
2.5 SOLUTION RESPIRATORY (INHALATION)
Status: COMPLETED | OUTPATIENT
Start: 2024-06-02 | End: 2024-06-02

## 2024-06-02 RX ADMIN — CEFTRIAXONE 1 G: 1 INJECTION, POWDER, FOR SOLUTION INTRAMUSCULAR; INTRAVENOUS at 03:06

## 2024-06-02 RX ADMIN — IOHEXOL 70 ML: 350 INJECTION, SOLUTION INTRAVENOUS at 02:06

## 2024-06-02 RX ADMIN — ACETAMINOPHEN 1000 MG: 500 TABLET ORAL at 01:06

## 2024-06-02 RX ADMIN — SODIUM CHLORIDE 1000 ML: 9 INJECTION, SOLUTION INTRAVENOUS at 01:06

## 2024-06-02 RX ADMIN — POTASSIUM CHLORIDE 10 MEQ: 7.46 INJECTION, SOLUTION INTRAVENOUS at 03:06

## 2024-06-02 RX ADMIN — ALBUTEROL SULFATE 2.5 MG: 2.5 SOLUTION RESPIRATORY (INHALATION) at 03:06

## 2024-06-02 NOTE — ED PROVIDER NOTES
Encounter Date: 6/2/2024       History     Chief Complaint   Patient presents with    Shortness of Breath     Pt c/o SOB, frequent cough, upper chest pain with breathing, & fever ongoing for a month; pt seen in ED on 5/30/24 for same complaints, dx with Bronchitis, and given RX for Z-pack, inhaler, & Neb treatments which pt has been taking with no relief; no Tylenol or Ibuprofen taken tonight     HPI  Review of patient's allergies indicates:   Allergen Reactions    Advair diskus [fluticasone propion-salmeterol] Anaphylaxis    Aspartame Anaphylaxis     Other reaction(s): UNKNOWN    Bupropion Anaphylaxis    Chloral hydrate analogues Other (See Comments)     Adverse reaction per grandma    Imitrex [sumatriptan succinate] Anaphylaxis    Ugo Anaphylaxis    Metoprolol Shortness Of Breath    Nitroglycerin Anaphylaxis     Other reaction(s): stop breathing    Quetiapine Hallucinations     Other reaction(s): HALLUCINATION    Reglan [metoclopramide hcl] Shortness Of Breath    Tomato (solanum lycopersicum) Anaphylaxis and Shortness Of Breath    Unable to assess Anaphylaxis     Crabs/not allergic to iodine    Zantac [ranitidine hcl] Shortness Of Breath    Hibiclens (isopropyl alcohol) Itching    Abilify [aripiprazole]     Ambien [zolpidem]     Banana      Other reaction(s): STOP BREATHING    Chloral hydrate      Other reaction(s): adverse affect    Compazine [prochlorperazine edisylate]     Crab      Other reaction(s): STOP BREATHING    Desmopressin     Eggplant      Other reaction(s): EGG PLANT    Honey      Other reaction(s): LIP SWELLING    Ketorolac      Other reaction(s): RASH    Kiwi (actinidia chinensis)     Lexapro [escitalopram oxalate]     Meclizine      hives    Metoclopramide      Other reaction(s): SHORTNESS OF BREATH    Ondansetron      Other reaction(s): RASH    Prochlorperazine      Other reaction(s): RASH    Remeron [mirtazapine]     Sucralose      Other reaction(s): STOPS  BREATHING    Tomato      Other reaction(s): stop breathing    Xanax [alprazolam]     Zofran [ondansetron hcl (pf)]     Mayonnaise Rash     Other reaction(s): STOP BREATHING    Sulfa (sulfonamide antibiotics) Rash and Nausea And Vomiting     Past Medical History:   Diagnosis Date    Asthma     Brain tumor, pineal gland     Depression     Endometriosis     Hypertension     Precocious puberty     Seizures     Syncope, cardiogenic     Thyroid disease      Past Surgical History:   Procedure Laterality Date     SECTION      COLONOSCOPY W/ BIOPSIES      ESOPHAGOGASTRODUODENOSCOPY      implantable cardiac monitor placement  2018    TONSILLECTOMY      TYMPANOSTOMY TUBE PLACEMENT       Family History   Problem Relation Name Age of Onset    Heart disease Mother      Hyperlipidemia Mother      Asthma Mother      Crohn's disease Mother  37        pending surgery. prior med; sulfasalazine    Depression Mother      Bipolar disorder Mother      Arthritis Mother      Anxiety disorder Mother      Other Mother          PTSD    Hypertension Maternal Grandmother      Asthma Maternal Grandmother      Depression Maternal Grandmother      Anxiety disorder Maternal Grandmother      Hypertension Paternal Grandmother      Diabetes Paternal Grandmother      Nephrolithiasis Father      Nephrolithiasis Brother      ADD / ADHD Brother      Depression Brother      Bipolar disorder Brother      Crohn's disease Maternal Grandfather      Alcohol abuse Paternal Grandfather      Breast cancer Neg Hx      Colon cancer Neg Hx      Ovarian cancer Neg Hx       Social History     Tobacco Use    Smoking status: Passive Smoke Exposure - Never Smoker    Smokeless tobacco: Never   Substance Use Topics    Alcohol use: No    Drug use: No     Review of Systems    Physical Exam     Initial Vitals [24 0107]   BP Pulse Resp Temp SpO2   131/86 (!) 120 20 (!) 101.8 °F (38.8 °C) (!) 94 %      MAP        --         Physical Exam    ED Course   Procedures  Labs Reviewed   CBC W/ AUTO DIFFERENTIAL - Abnormal; Notable for the following components:       Result Value    WBC 3.77 (*)     Hemoglobin 10.5 (*)     Hematocrit 36.3 (*)     MCV 72 (*)     MCH 20.9 (*)     MCHC 28.9 (*)     RDW 18.2 (*)     All other components within normal limits   COMPREHENSIVE METABOLIC PANEL   D DIMER, QUANTITATIVE   B-TYPE NATRIURETIC PEPTIDE   POCT URINE PREGNANCY          Imaging Results              X-Ray Chest AP Portable (Final result)  Result time 06/02/24 01:32:13      Final result by Shaun Erwin MD (06/02/24 01:32:13)                   Impression:      Mild patchy opacity in the right lower lung zone which could be secondary to possible aspiration or infectious process/developing pneumonia in the right clinical setting.  This is new compared to recent radiograph from 05/30/2024.      Electronically signed by: Shaun Erwin MD  Date:    06/02/2024  Time:    01:32               Narrative:    EXAMINATION:  XR CHEST AP PORTABLE    CLINICAL HISTORY:  Shortness of breath    TECHNIQUE:  Single frontal view of the chest was performed.    COMPARISON:  05/30/2024.    FINDINGS:  Cardiac silhouette is normal in size.  Loop recorder device is seen.  Lungs are symmetrically expanded.  Left lung is relatively clear.  Patchy opacity is seen in the right lower lung zone.  No confluent consolidation is seen.  No pneumothorax or significant pleural effusion.  No acute osseous abnormality identified.                                       Medications   sodium chloride 0.9% bolus 1,000 mL 1,000 mL (1,000 mLs Intravenous New Bag 6/2/24 0145)   acetaminophen tablet 1,000 mg (1,000 mg Oral Given 6/2/24 0121)     Medical Decision Making  Amount and/or Complexity of Data Reviewed  Labs: ordered.  Radiology: ordered.    Risk  Prescription drug management.               ED Course as of 06/02/24 0236   Sun Jun 02, 2024   0140 Influenza and  SARS-COVID-2 screen negative 3 days prior.  Chest x-ray -3 days prior.  CBC and CMP reviewed from visit 3 days prior. [JM]   0152 Curb 65 score of 0. [JM]   0233 Platelet Count: 217 [JM]   0233 WBC(!): 3.77 [JM]   0233 Hemoglobin(!): 10.5 [JM]      ED Course User Index  [JM] Yang Klein MD                           Clinical Impression:  Final diagnoses:  [R06.02] Shortness of breath  [R50.9] Fever  [J18.9] Pneumonia of right lower lobe due to infectious organism (Primary)

## 2024-06-02 NOTE — ED PROVIDER NOTES
Encounter Date: 6/2/2024    SCRIBE #1 NOTE: I, Elinor Rowley, am scribing for, and in the presence of,  Yang Klein MD.   SCRIBE #2 NOTE: I, Sudha Delacruz, am scribing for, and in the presence of,  Yang Klein MD. I have scribed the remaining portions of the note not scribed by Scribe #1.     History     Chief Complaint   Patient presents with    Shortness of Breath     Pt c/o SOB, frequent cough, upper chest pain with breathing, & fever ongoing for a month; pt seen in ED on 5/30/24 for same complaints, dx with Bronchitis, and given RX for Z-pack, inhaler, & Neb treatments which pt has been taking with no relief; no Tylenol or Ibuprofen taken robert Hunter is a 24 y.o. female, with a PMHx of asthma, endometriosis, HTN, seizures, thyroid disease, who presents to the ED with a cough and SOB x1 month, worse in the last week. Reports associated symptoms of chest pain that is worse when she coughs and with exertion, headache, nausea, vomiting, diarrhea, sore throat, bilateral otalgia, decreased appetite, and urinary frequency. She reports she has taken 4 days of z-pack prescribed at previous ER visit for this problem. No other exacerbating or alleviating factors. Denies hematochezia or other associated symptoms.       The history is provided by the patient. No  was used.     Review of patient's allergies indicates:   Allergen Reactions    Advair diskus [fluticasone propion-salmeterol] Anaphylaxis    Aspartame Anaphylaxis     Other reaction(s): UNKNOWN    Bupropion Anaphylaxis    Chloral hydrate analogues Other (See Comments)     Adverse reaction per grandma    Imitrex [sumatriptan succinate] Anaphylaxis    Ugo Anaphylaxis    Metoprolol Shortness Of Breath    Nitroglycerin Anaphylaxis     Other reaction(s): stop breathing    Quetiapine Hallucinations     Other reaction(s): HALLUCINATION    Reglan [metoclopramide hcl] Shortness Of Breath    Tomato (solanum lycopersicum)  Anaphylaxis and Shortness Of Breath    Unable to assess Anaphylaxis     Crabs/not allergic to iodine    Zantac [ranitidine hcl] Shortness Of Breath    Hibiclens (isopropyl alcohol) Itching    Abilify [aripiprazole]     Ambien [zolpidem]     Banana      Other reaction(s): STOP BREATHING    Chloral hydrate      Other reaction(s): adverse affect    Compazine [prochlorperazine edisylate]     Crab      Other reaction(s): STOP BREATHING    Desmopressin     Eggplant      Other reaction(s): EGG PLANT    Honey      Other reaction(s): LIP SWELLING    Ketorolac      Other reaction(s): RASH    Kiwi (actinidia chinensis)     Lexapro [escitalopram oxalate]     Meclizine      hives    Metoclopramide      Other reaction(s): SHORTNESS OF BREATH    Ondansetron      Other reaction(s): RASH    Prochlorperazine      Other reaction(s): RASH    Remeron [mirtazapine]     Sucralose      Other reaction(s): STOPS BREATHING    Tomato      Other reaction(s): stop breathing    Xanax [alprazolam]     Zofran [ondansetron hcl (pf)]     Mayonnaise Rash     Other reaction(s): STOP BREATHING    Sulfa (sulfonamide antibiotics) Rash and Nausea And Vomiting     Past Medical History:   Diagnosis Date    Asthma     Brain tumor, pineal gland     Depression     Endometriosis     Hypertension     Precocious puberty     Seizures     Syncope, cardiogenic     Thyroid disease      Past Surgical History:   Procedure Laterality Date     SECTION      COLONOSCOPY W/ BIOPSIES      ESOPHAGOGASTRODUODENOSCOPY      implantable cardiac monitor placement  2018    TONSILLECTOMY      TYMPANOSTOMY TUBE PLACEMENT       Family History   Problem Relation Name Age of Onset    Heart disease Mother      Hyperlipidemia Mother      Asthma Mother      Crohn's disease Mother  37        pending surgery. prior med; sulfasalazine    Depression Mother      Bipolar disorder Mother      Arthritis Mother      Anxiety disorder Mother      Other Mother          PTSD     Hypertension Maternal Grandmother      Asthma Maternal Grandmother      Depression Maternal Grandmother      Anxiety disorder Maternal Grandmother      Hypertension Paternal Grandmother      Diabetes Paternal Grandmother      Nephrolithiasis Father      Nephrolithiasis Brother      ADD / ADHD Brother      Depression Brother      Bipolar disorder Brother      Crohn's disease Maternal Grandfather      Alcohol abuse Paternal Grandfather      Breast cancer Neg Hx      Colon cancer Neg Hx      Ovarian cancer Neg Hx       Social History     Tobacco Use    Smoking status: Passive Smoke Exposure - Never Smoker    Smokeless tobacco: Never   Substance Use Topics    Alcohol use: No    Drug use: No     Review of Systems   Constitutional:  Positive for appetite change (<), fatigue and fever.   HENT:  Positive for ear pain and sore throat.    Respiratory:  Positive for cough and shortness of breath.    Cardiovascular:  Positive for chest pain.   Gastrointestinal:  Positive for diarrhea, nausea and vomiting. Negative for blood in stool.   Neurological:  Positive for headaches. Negative for dizziness.       Physical Exam     Initial Vitals [06/02/24 0107]   BP Pulse Resp Temp SpO2   131/86 (!) 120 20 (!) 101.8 °F (38.8 °C) (!) 94 %      MAP       --         Physical Exam    Nursing note and vitals reviewed.  Constitutional: She appears well-developed. She is not diaphoretic. No distress.   HENT:   Head: Normocephalic.   No erythema, edema or exudate to posterior oropharynx.   Eyes: Conjunctivae and EOM are normal.   Neck:   Anterior cervical lymphadenopathy.    Cardiovascular:  Regular rhythm.   Tachycardia present.         No murmur heard.  Pulmonary/Chest: Breath sounds normal. She has no wheezes.   Able to speak in full sentences.   Abdominal: Abdomen is soft. She exhibits no distension. There is no abdominal tenderness.   Musculoskeletal:         General: Normal range of motion.      Comments: No BLE edema.      Neurological:  She is alert.   Skin: Skin is warm.   No rash to BLE.          ED Course   Procedures  Labs Reviewed   COMPREHENSIVE METABOLIC PANEL - Abnormal; Notable for the following components:       Result Value    Potassium 3.3 (*)     CO2 21 (*)     Glucose 121 (*)     Albumin 3.4 (*)     All other components within normal limits   CBC W/ AUTO DIFFERENTIAL - Abnormal; Notable for the following components:    WBC 3.77 (*)     Hemoglobin 10.5 (*)     Hematocrit 36.3 (*)     MCV 72 (*)     MCH 20.9 (*)     MCHC 28.9 (*)     RDW 18.2 (*)     All other components within normal limits   D DIMER, QUANTITATIVE - Abnormal; Notable for the following components:    D-Dimer 0.97 (*)     All other components within normal limits   B-TYPE NATRIURETIC PEPTIDE   POCT URINE PREGNANCY          Imaging Results              CTA Chest Non-Coronary (PE Studies) (Final result)  Result time 06/02/24 03:22:50      Final result by Jeff Johnson MD (06/02/24 03:22:50)                   Impression:      Adequate contrast bolus opacification of the pulmonary arteries. No acute central pulmonary thromboembolism to the level of the proximal segmental arteries.    Diffuse patchy nodular airspace infiltrates throughout the right lung, possible infectious process such as atypical pneumonia versus aspiration.  Correlate clinically.    Mildly enlarged hilar and mediastinal lymph nodes, likely reactive.      Electronically signed by: Jeff Johnson MD  Date:    06/02/2024  Time:    03:22               Narrative:    EXAMINATION:  CTA CHEST NON CORONARY (PE STUDIES)    CLINICAL HISTORY:  Pulmonary embolism (PE) suspected, positive D-dimer;    TECHNIQUE:  Low dose axial images, sagittal and coronal reformations were obtained from the thoracic inlet to the lung bases following the IV administration of 70 mL of Omnipaque 350.  Contrast timing was optimized to evaluate the pulmonary arteries.  MIP images were  performed.    COMPARISON:  None    FINDINGS:    Adequate contrast bolus opacification of the pulmonary arteries. No acute central pulmonary thromboembolism to the level of the proximal segmental arteries.  Mildly enlarged hilar and mediastinal lymph nodes.  No pleural or pericardial effusion. Diffuse patchy nodular airspace infiltrates throughout the right lung.  Left lung is clear.  No pneumothorax.  Limited images through the upper abdomen are unremarkable.                                       X-Ray Chest AP Portable (Final result)  Result time 06/02/24 01:32:13      Final result by Shaun Erwin MD (06/02/24 01:32:13)                   Impression:      Mild patchy opacity in the right lower lung zone which could be secondary to possible aspiration or infectious process/developing pneumonia in the right clinical setting.  This is new compared to recent radiograph from 05/30/2024.      Electronically signed by: Shaun Erwin MD  Date:    06/02/2024  Time:    01:32               Narrative:    EXAMINATION:  XR CHEST AP PORTABLE    CLINICAL HISTORY:  Shortness of breath    TECHNIQUE:  Single frontal view of the chest was performed.    COMPARISON:  05/30/2024.    FINDINGS:  Cardiac silhouette is normal in size.  Loop recorder device is seen.  Lungs are symmetrically expanded.  Left lung is relatively clear.  Patchy opacity is seen in the right lower lung zone.  No confluent consolidation is seen.  No pneumothorax or significant pleural effusion.  No acute osseous abnormality identified.                                       Medications   acetaminophen tablet 1,000 mg (1,000 mg Oral Given 6/2/24 0121)   sodium chloride 0.9% bolus 1,000 mL 1,000 mL (0 mLs Intravenous Stopped 6/2/24 0245)   iohexoL (OMNIPAQUE 350) injection 70 mL (70 mLs Intravenous Given 6/2/24 0258)   albuterol sulfate nebulizer solution 2.5 mg (2.5 mg Nebulization Given 6/2/24 0314)   potassium chloride 10 mEq in 100 mL IVPB (0 mEq Intravenous  Stopped 6/2/24 9203)   cefTRIAXone (Rocephin) 1 g in dextrose 5 % in water (D5W) 100 mL IVPB (MB+) (0 g Intravenous Stopped 6/2/24 6220)     Medical Decision Making  24-year-old female presenting with dyspnea, cough, chest discomfort with coughing.  Patient is seen 3 days prior with similar complaint.  Patient reports continued symptoms.  Clear lung sounds on auscultation.  Patient arrived febrile.  Patient provided antipyretics.  Single albuterol treatment provided for symptom control with coughing.  Chest x-ray shows right lower lobe infiltrate.  Suspicion of pneumonia.  Patient recently discharged on azithromycin.  Antibiotics brought in with Augmentin and single dose of Rocephin.  Curb 65 score is 0.  Hemodynamically stable.  Patient to complete course of antibiotics for community-acquired pneumonia.  Slightly elevated D-dimer.  CTA negative for pulmonary embolus.      Differential diagnosis includes URI, lower respiratory tract infection including pneumonia or bronchitis, cardiomyopathy, pulmonary embolus      ECG:  Please check workup area for ECG interpretation.      Amount and/or Complexity of Data Reviewed  Labs: ordered. Decision-making details documented in ED Course.  Radiology: ordered. Decision-making details documented in ED Course.  ECG/medicine tests: ordered and independent interpretation performed. Decision-making details documented in ED Course.    Risk  Prescription drug management.            Scribe Attestation:   Scribe #1: I performed the above scribed service and the documentation accurately describes the services I performed. I attest to the accuracy of the note.  Scribe #2: I performed the above scribed service and the documentation accurately describes the services I performed. I attest to the accuracy of the note.        ED Course as of 06/02/24 0501   Sun Jun 02, 2024   0140 Influenza and SARS-COVID-2 screen negative 3 days prior.  Chest x-ray -3 days prior.  CBC and CMP reviewed from  visit 3 days prior. [JM]   0152 Curb 65 score of 0. [JM]   0233 Platelet Count: 217 [JM]   0233 WBC(!): 3.77 [JM]   0233 Hemoglobin(!): 10.5 [JM]   0500 EKG 12-lead  Time 1:51 a.m.     Rate 109, sinus, regular rhythm, normal axis.   QRS 84 .  No ST elevation or depression.  T-wave inversion lead 3.  No Q-waves present     Sinus tachycardia with inferior T-wave inversion. [JM]      ED Course User Index  [JM] Yang Klein MD                         IYang, personally performed the services described in this documentation. All medical record entries made by the scribe were at my direction and in my presence.  I have reviewed the chart and agree that the record reflects my personal performance and is accurate and complete.    Clinical Impression:  Final diagnoses:  [R06.02] Shortness of breath  [R50.9] Fever  [J18.9] Pneumonia of right lower lobe due to infectious organism (Primary)  [E87.6] Hypokalemia          ED Disposition Condition    Discharge Stable          ED Prescriptions       Medication Sig Dispense Start Date End Date Auth. Provider    amoxicillin-clavulanate 875-125mg (AUGMENTIN) 875-125 mg per tablet Take 1 tablet by mouth 2 (two) times daily. 14 tablet 6/2/2024 -- Yang Klein MD          Follow-up Information       Follow up With Specialties Details Why Contact Info    Idris Carreon MD Family Medicine Schedule an appointment as soon as possible for a visit in 3 days Primary care [address] [facility] [address]  [phone]      [facility]  Schedule an appointment as soon as possible for a visit in 1 week Primary care [address] [facility] [address] [phone]    [facility] - Emergency Dept Emergency Medicine  If symptoms worsen [address] [facility] [place] [address] [phone]             Yang Klein MD  06/02/24 1415

## 2024-06-02 NOTE — DISCHARGE INSTRUCTIONS
You may continue taking the azithromycin.  In addition recommend using the Augmentin.    Please follow up with the primary care provider within 3-5 days for re-evaluation.    Recommend Tylenol as needed for fever.    Seek medical care if symptoms worsening or concerns.

## 2024-06-02 NOTE — Clinical Note
"Eliu Nolenjane Hunter was seen and treated in our emergency department on 6/2/2024.  She may return to work on 06/10/2024.       If you have any questions or concerns, please don't hesitate to call.      Yang Klein MD"

## 2024-06-03 LAB
BACTERIA BLD CULT: NORMAL
BACTERIA BLD CULT: NORMAL

## 2024-06-06 LAB
OHS QRS DURATION: 84 MS
OHS QTC CALCULATION: 474 MS

## 2024-08-12 ENCOUNTER — OFFICE VISIT (OUTPATIENT)
Dept: PODIATRY | Facility: CLINIC | Age: 24
End: 2024-08-12
Payer: MEDICAID

## 2024-08-12 VITALS
WEIGHT: 247.81 LBS | HEART RATE: 95 BPM | DIASTOLIC BLOOD PRESSURE: 81 MMHG | SYSTOLIC BLOOD PRESSURE: 125 MMHG | HEIGHT: 61 IN | BODY MASS INDEX: 46.78 KG/M2

## 2024-08-12 DIAGNOSIS — M79.672 BILATERAL FOOT PAIN: ICD-10-CM

## 2024-08-12 DIAGNOSIS — M79.671 BILATERAL FOOT PAIN: ICD-10-CM

## 2024-08-12 DIAGNOSIS — M72.2 PLANTAR FASCIITIS: Primary | ICD-10-CM

## 2024-08-12 DIAGNOSIS — E66.01 MORBIDLY OBESE: ICD-10-CM

## 2024-08-12 PROCEDURE — 3008F BODY MASS INDEX DOCD: CPT | Mod: CPTII,,, | Performed by: PODIATRIST

## 2024-08-12 PROCEDURE — 99203 OFFICE O/P NEW LOW 30 MIN: CPT | Mod: S$PBB,,, | Performed by: PODIATRIST

## 2024-08-12 PROCEDURE — 99999 PR PBB SHADOW E&M-EST. PATIENT-LVL IV: CPT | Mod: PBBFAC,,, | Performed by: PODIATRIST

## 2024-08-12 PROCEDURE — 99214 OFFICE O/P EST MOD 30 MIN: CPT | Mod: PBBFAC,PN | Performed by: PODIATRIST

## 2024-08-12 PROCEDURE — 1159F MED LIST DOCD IN RCRD: CPT | Mod: CPTII,,, | Performed by: PODIATRIST

## 2024-08-12 PROCEDURE — 3074F SYST BP LT 130 MM HG: CPT | Mod: CPTII,,, | Performed by: PODIATRIST

## 2024-08-12 PROCEDURE — 3079F DIAST BP 80-89 MM HG: CPT | Mod: CPTII,,, | Performed by: PODIATRIST

## 2024-08-12 RX ORDER — DICLOFENAC SODIUM 10 MG/G
2 GEL TOPICAL 4 TIMES DAILY
Qty: 350 G | Refills: 2 | Status: SHIPPED | OUTPATIENT
Start: 2024-08-12

## 2024-08-12 NOTE — PATIENT INSTRUCTIONS
Visco-gel universal metatarsal strap - large/xlarge right & left    PPT arch pads w/ adhesive - small

## 2024-08-12 NOTE — PROGRESS NOTES
Subjective:      Patient ID: Eliu Hunter is a 24 y.o. female.    Chief Complaint: Foot Pain (Right and left)    Eliu is a 24 y.o. female who presents new to the podiatry clinic on referral from PCP. She is accompanied by her . Patient c/o B/L foot pain - 'feels like feet on fire'. Onset of the symptoms was  couple of months . States horrible burning pain bottom of foot mainly heels w/ shooting pain. Tried different shoes & insoles w/out relief, heat or cold also, tried Biofresze, BenGay & Icy hot as well as soaks, compression socks BK B/L (still wears @ home). Had Xrays Fri.@ [facility]. Worse @ work. Works inside [employer]; recent 16 hrs.    PCP Idris Carreon MD @ [facility] ([facility])    Past Medical History:   Diagnosis Date    Asthma     Brain tumor, pineal gland     Depression     Endometriosis     Hypertension     Precocious puberty     Seizures     Syncope, cardiogenic     Thyroid disease      Patient Active Problem List   Diagnosis    Precocious puberty    Dyspepsia    Overweight    Hypothyroid    PTSD (post-traumatic stress disorder)    Vasovagal syncope    Syncope    Seizure-like activity    History of loop recorder    Dysmenorrhea    Palpitations    Left-sided chest wall pain    Radioulnar synostosis of right upper extremity    Muscle pain      Objective:      Review of Systems   Constitutional: Positive for weight loss. Negative for malaise/fatigue.   Musculoskeletal:  Positive for myalgias. Negative for falls.   Neurological:  Positive for paresthesias. Negative for focal weakness and sensory change.   Psychiatric/Behavioral:  The patient is nervous/anxious.      Physical Exam  Vitals reviewed.   Constitutional:       General: She is not in acute distress.     Appearance: She is well-developed. She is morbidly obese.   Cardiovascular:      Pulses:           Dorsalis pedis pulses are 2+ on the right side and 2+ on the left side.   Musculoskeletal:         General: Tenderness  present. No swelling.      Right foot: Deformity (high MLA B/L) present.      Left foot: Deformity present.   Feet:      Right foot:      Skin integrity: Skin integrity normal.      Left foot:      Skin integrity: Skin integrity normal.      Comments: Equinus B/L ankles w/ < 90 deg foot to leg noted w/ knees extended.      MS strength of extrinsics to foot & ankle B/L + 5/5 in DF/PF/Inv/Ev to resistance w/ no reproduction of pain in any direction.     Passive ROM of ankle & pedal joints is painless & w/out crepitation B/L.     Skin:     General: Skin is warm and dry.      Capillary Refill: Capillary refill takes less than 2 seconds.      Findings: No bruising, burn, signs of injury, lesion or wound.   Neurological:      Mental Status: She is alert and oriented to person, place, and time.      Sensory: Sensation is intact. No sensory deficit.      Motor: Motor function is intact. No weakness or abnormal muscle tone.      Gait: Gait is intact. Gait normal.   Psychiatric:         Mood and Affect: Affect normal. Mood is anxious.         Behavior: Behavior normal. Behavior is cooperative.         Assessment:      Encounter Diagnoses   Name Primary?    Bilateral foot pain     Plantar fasciitis Yes       Problem List Items Addressed This Visit    None  Visit Diagnoses       Plantar fasciitis    -  Primary    Relevant Medications    diclofenac sodium (VOLTAREN) 1 % Gel    Bilateral foot pain              Plan:       Eliu was seen today for foot pain.    Diagnoses and all orders for this visit:    Plantar fasciitis  -     diclofenac sodium (VOLTAREN) 1 % Gel; Apply 2 g topically 4 (four) times daily.    Bilateral foot pain  -     Ambulatory referral/consult to Podiatry    I counseled the patient on her conditions, their implications & medical mgmt.    Discussed general issues surrounding plantar fasciitis along w/ the advantages & disadvantages of various tx strategies.  -Discussed shoe choice.  The importance of wearing  shoes w/ adequate roomto accommodate deformities were discussed.   Recommended shoes w/ adequate arch supports to alleviate abnormal pressure & improve stability of foot while walking/ WB.   Avoid flat shoes or barefoot walking as these will exacerbate or worsen symptoms.  .   -Discussed non-prescription inserts.  PPT arch pads added to current shoe gear.  Dispensed Silopos gelstrap as alternative MLA support.  -Discussed Rx/ OTC topical NSAID such as Diclofenac 1% gel up to qid prn &/ OR PO NSAID.  Patient will start w/ topical Voltaren gel.   -Other options include PO Medrol dosepak prior to PO NSAID &/ OR injection of LA & steroid.     Patient was amenable to the above recommendations, all questions asked were answered, left my office fully informed & will f/u 3-4 wks., sooner PRN.        A total of 32 mins.was spent on chart review, patient visit & documentation.

## 2024-08-20 NOTE — PROGRESS NOTES
Subjective:      Patient ID: Eliu Hunter is a 24 y.o. female.    Chief Complaint: Follow-up and Plantar Fasciitis    Patient is here for f/u heel pain B/L R>L; states has pain in evenings but worse in am; not easing. Using PPT arch pads but never stays in place so using gelstrap more in all shoes. Brought in work shoes for pads.  Had also purchased Dr.Scholls inserts. Pain level @ worst is 9/10 R & 8/10 L.  8/12/24  Eliu is a 24 y.o. female who presents new to the podiatry clinic on referral from PCP. She is accompanied by her . Patient c/o B/L foot pain - 'feels like feet on fire'. Onset of the symptoms was  couple of months . States horrible burning pain bottom of foot mainly heels w/ shooting pain. Tried different shoes & insoles w/out relief, heat or cold also, tried Biofresze, BenGay & Icy hot as well as soaks, compression socks BK B/L (still wears @ home). Had Xrays Fri.@ [facility]. Worse @ work. Works inside [employer]; recent 16 hrs.    PCP Idris Carreon MD @ [facility])    Past Medical History:   Diagnosis Date    Asthma     Brain tumor, pineal gland     Depression     Endometriosis     Hypertension     Precocious puberty     Seizures     Syncope, cardiogenic     Thyroid disease      Patient Active Problem List   Diagnosis    Precocious puberty    Dyspepsia    Overweight    Hypothyroid    PTSD (post-traumatic stress disorder)    Vasovagal syncope    Syncope    Seizure-like activity    History of loop recorder    Dysmenorrhea    Palpitations    Left-sided chest wall pain    Radioulnar synostosis of right upper extremity    Muscle pain      Objective:      Review of Systems   Constitutional: Positive for weight loss. Negative for malaise/fatigue.   Musculoskeletal:  Positive for myalgias. Negative for falls.   Neurological:  Positive for paresthesias. Negative for focal weakness and sensory change.   Psychiatric/Behavioral:  The patient is nervous/anxious.      Physical  Exam  Vitals reviewed.   Constitutional:       General: She is not in acute distress.     Appearance: She is well-developed. She is morbidly obese.   Cardiovascular:      Pulses:           Dorsalis pedis pulses are 2+ on the right side and 2+ on the left side.   Musculoskeletal:         General: Tenderness present. No swelling.      Right foot: Deformity (high MLA B/L) present.      Left foot: Deformity present.   Feet:      Right foot:      Skin integrity: Skin integrity normal.      Left foot:      Skin integrity: Skin integrity normal.      Comments: Equinus B/L ankles w/ < 90 deg foot to leg noted w/ knees extended.      MS strength of extrinsics to foot & ankle B/L + 5/5 in DF/PF/Inv/Ev to resistance w/ no reproduction of pain in any direction.     Passive ROM of ankle & pedal joints is painless & w/out crepitation B/L.    Inadequate MLA in shoes & inserts currently, but also throughout the day.     Skin:     General: Skin is warm and dry.      Capillary Refill: Capillary refill takes less than 2 seconds.      Findings: No bruising, burn, erythema, signs of injury or wound.   Neurological:      Mental Status: She is alert and oriented to person, place, and time.      Sensory: Sensation is intact. No sensory deficit.      Motor: Motor function is intact. No weakness or abnormal muscle tone.      Gait: Gait is intact. Gait normal.   Psychiatric:         Mood and Affect: Affect normal. Mood is anxious.         Behavior: Behavior normal. Behavior is cooperative.         Assessment:      Encounter Diagnoses   Name Primary?    Plantar fasciitis Yes    History of burning pain in lower extremity     Heel pain, unspecified laterality        Problem List Items Addressed This Visit    None  Visit Diagnoses       Plantar fasciitis    -  Primary    Relevant Orders    Injection medial heel: L plantar fascia    Injection medial heel: R plantar fascia    History of burning pain in lower extremity        Heel pain, unspecified  laterality              Plan:       Eliu was seen today for follow-up and plantar fasciitis.    Diagnoses and all orders for this visit:    Plantar fasciitis  -     Injection medial heel: L plantar fascia  -     Injection medial heel: R plantar fascia    History of burning pain in lower extremity    Heel pain, unspecified laterality    I counseled the patient on her conditions, their implications & medical mgmt.    Added large PPT met.pads to MLA of OTC Dr. Crooks.  May continue w/ Silopos gelstrap as alternative MLA support.  Continue topical Voltaren gel up to qid.   Injection B/L medial heels w/ LA & steroid.         A total of 33 mins.was spent on chart review, patient visit & documentation.

## 2024-09-09 ENCOUNTER — OFFICE VISIT (OUTPATIENT)
Dept: PODIATRY | Facility: CLINIC | Age: 24
End: 2024-09-09
Payer: MEDICAID

## 2024-09-09 VITALS
HEART RATE: 92 BPM | DIASTOLIC BLOOD PRESSURE: 83 MMHG | HEIGHT: 61 IN | BODY MASS INDEX: 48.04 KG/M2 | WEIGHT: 254.44 LBS | SYSTOLIC BLOOD PRESSURE: 125 MMHG

## 2024-09-09 DIAGNOSIS — M72.2 PLANTAR FASCIITIS: Primary | ICD-10-CM

## 2024-09-09 DIAGNOSIS — M79.673 HEEL PAIN, UNSPECIFIED LATERALITY: ICD-10-CM

## 2024-09-09 DIAGNOSIS — Z87.39 HISTORY OF BURNING PAIN IN LOWER EXTREMITY: ICD-10-CM

## 2024-09-09 PROCEDURE — 3008F BODY MASS INDEX DOCD: CPT | Mod: CPTII,,, | Performed by: PODIATRIST

## 2024-09-09 PROCEDURE — 1159F MED LIST DOCD IN RCRD: CPT | Mod: CPTII,,, | Performed by: PODIATRIST

## 2024-09-09 PROCEDURE — 99214 OFFICE O/P EST MOD 30 MIN: CPT | Mod: 25,S$PBB,, | Performed by: PODIATRIST

## 2024-09-09 PROCEDURE — 99999 PR PBB SHADOW E&M-EST. PATIENT-LVL III: CPT | Mod: PBBFAC,,, | Performed by: PODIATRIST

## 2024-09-09 PROCEDURE — 99999PBSHW PR PBB SHADOW TECHNICAL ONLY FILED TO HB: Mod: PBBFAC,,,

## 2024-09-09 PROCEDURE — 20550 NJX 1 TENDON SHEATH/LIGAMENT: CPT | Mod: PBBFAC,PN,RT | Performed by: PODIATRIST

## 2024-09-09 PROCEDURE — 3079F DIAST BP 80-89 MM HG: CPT | Mod: CPTII,,, | Performed by: PODIATRIST

## 2024-09-09 PROCEDURE — 20550 NJX 1 TENDON SHEATH/LIGAMENT: CPT | Mod: PBBFAC,PN,LT | Performed by: PODIATRIST

## 2024-09-09 PROCEDURE — 99213 OFFICE O/P EST LOW 20 MIN: CPT | Mod: PBBFAC,PN,25 | Performed by: PODIATRIST

## 2024-09-09 PROCEDURE — 3074F SYST BP LT 130 MM HG: CPT | Mod: CPTII,,, | Performed by: PODIATRIST

## 2024-09-09 RX ADMIN — METHYLPREDNISOLONE ACETATE 40 MG: 40 INJECTION, SUSPENSION INTRA-ARTICULAR; INTRALESIONAL; INTRAMUSCULAR; SOFT TISSUE at 10:09

## 2024-09-09 RX ADMIN — DEXAMETHASONE SODIUM PHOSPHATE 4 MG: 4 INJECTION, SOLUTION INTRA-ARTICULAR; INTRALESIONAL; INTRAMUSCULAR; INTRAVENOUS; SOFT TISSUE at 10:09

## 2024-09-09 NOTE — PROCEDURES
Injection medial heel: L plantar fascia    Date/Time: 9/9/2024 10:45 AM    Performed by: Mariana Goodson DPM  Authorized by: Mariana Goodson DPM    Consent Done?:  Yes (Verbal)  Indications:  Pain  Local anesthesia used?: Yes    Anesthesia:  Local infiltration  Local anesthetic:  Bupivacaine 0.5% without epinephrine, lidocaine 2% without epinephrine and topical anesthetic  Anesthetic total (ml):  1    Location:  Foot  Site:  L plantar fascia  Needle size:  25 G  Approach:  Medial  Medications:  4 mg dexAMETHasone 4 mg/mL; 40 mg methylPREDNISolone acetate 40 mg/mL  Patient tolerance:  Patient tolerated the procedure well with no immediate complications  Injection medial heel: R plantar fascia    Date/Time: 9/9/2024 10:45 AM    Performed by: Mariana Goodson DPM  Authorized by: Mariana Goodson DPM    Consent Done?:  Yes (Verbal)  Indications:  Pain  Local anesthesia used?: Yes    Anesthesia:  Local infiltration  Local anesthetic:  Bupivacaine 0.5% without epinephrine, lidocaine 2% without epinephrine and topical anesthetic  Anesthetic total (ml):  1    Location:  Foot  Site:  R plantar fascia  Needle size:  25 G  Approach:  Medial  Medications:  4 mg dexAMETHasone 4 mg/mL; 40 mg methylPREDNISolone acetate 40 mg/mL  Patient tolerance:  Patient tolerated the procedure well with no immediate complications

## 2024-09-16 RX ORDER — METHYLPREDNISOLONE ACETATE 40 MG/ML
40 INJECTION, SUSPENSION INTRA-ARTICULAR; INTRALESIONAL; INTRAMUSCULAR; SOFT TISSUE
Status: DISCONTINUED | OUTPATIENT
Start: 2024-09-09 | End: 2024-09-16 | Stop reason: HOSPADM

## 2024-09-16 RX ORDER — DEXAMETHASONE SODIUM PHOSPHATE 4 MG/ML
4 INJECTION, SOLUTION INTRA-ARTICULAR; INTRALESIONAL; INTRAMUSCULAR; INTRAVENOUS; SOFT TISSUE
Status: DISCONTINUED | OUTPATIENT
Start: 2024-09-09 | End: 2024-09-16 | Stop reason: HOSPADM

## 2024-10-08 ENCOUNTER — OFFICE VISIT (OUTPATIENT)
Dept: PODIATRY | Facility: CLINIC | Age: 24
End: 2024-10-08
Payer: MEDICAID

## 2024-10-08 VITALS
BODY MASS INDEX: 47.37 KG/M2 | DIASTOLIC BLOOD PRESSURE: 104 MMHG | WEIGHT: 250.88 LBS | HEIGHT: 61 IN | HEART RATE: 92 BPM | SYSTOLIC BLOOD PRESSURE: 145 MMHG

## 2024-10-08 DIAGNOSIS — R20.8 BURNING SENSATION OF FEET: Primary | ICD-10-CM

## 2024-10-08 DIAGNOSIS — M79.673 HEEL PAIN, UNSPECIFIED LATERALITY: ICD-10-CM

## 2024-10-08 DIAGNOSIS — M72.2 PLANTAR FASCIITIS: ICD-10-CM

## 2024-10-08 PROCEDURE — 3008F BODY MASS INDEX DOCD: CPT | Mod: CPTII,,, | Performed by: PODIATRIST

## 2024-10-08 PROCEDURE — 1159F MED LIST DOCD IN RCRD: CPT | Mod: CPTII,,, | Performed by: PODIATRIST

## 2024-10-08 PROCEDURE — 99213 OFFICE O/P EST LOW 20 MIN: CPT | Mod: S$PBB,,, | Performed by: PODIATRIST

## 2024-10-08 PROCEDURE — 3080F DIAST BP >= 90 MM HG: CPT | Mod: CPTII,,, | Performed by: PODIATRIST

## 2024-10-08 PROCEDURE — 99213 OFFICE O/P EST LOW 20 MIN: CPT | Mod: PBBFAC,PN | Performed by: PODIATRIST

## 2024-10-08 PROCEDURE — 99999 PR PBB SHADOW E&M-EST. PATIENT-LVL III: CPT | Mod: PBBFAC,,, | Performed by: PODIATRIST

## 2024-10-08 PROCEDURE — 3077F SYST BP >= 140 MM HG: CPT | Mod: CPTII,,, | Performed by: PODIATRIST

## 2024-10-08 NOTE — PROGRESS NOTES
Subjective:      Patient ID: Eliu Hunter is a 24 y.o. female.    Chief Complaint: Foot Pain (Right and left, wants injections)    Patient is here for F/U heel pain and possible injection.  Has some residual burning pain and indicates plantar lateral heel B/L R> L; pain level 6/10 R in 1/10 L. Had B/L injections medial heels last visit which helped significantly w/ symptoms.  Using arch pads added to  OR gelstraps in all shoe gear.  9/9/24  Patient is here for f/u heel pain B/L R>L; states has pain in evenings but worse in am; not easing. Using PPT arch pads but never stays in place so using gelstrap more in all shoes. Brought in work shoes for pads.  Had also purchased Dr.Scholls inserts. Pain level @ worst is 9/10 R & 8/10 L.  8/12/24  Eliu is a 24 y.o. female who presents new to the podiatry clinic on referral from PCP. She is accompanied by her . Patient c/o B/L foot pain - 'feels like feet on fire'. Onset of the symptoms was  couple of months . States horrible burning pain bottom of foot mainly heels w/ shooting pain. Tried different shoes & insoles w/out relief, heat or cold also, tried Biofresze, BenGay & Icy hot as well as soaks, compression socks BK B/L (still wears @ home). Had Xrays Fri.@ [facility]. Worse @ work. Works inside [employer]; recent 16 hrs.    PCP Idris Carreon MD @ [facility] ([facility])    Past Medical History:   Diagnosis Date    Asthma     Brain tumor, pineal gland     Depression     Endometriosis     Hypertension     Precocious puberty     Seizures     Syncope, cardiogenic     Thyroid disease      Patient Active Problem List   Diagnosis    Precocious puberty    Dyspepsia    Overweight    Hypothyroid    PTSD (post-traumatic stress disorder)    Vasovagal syncope    Syncope    Seizure-like activity    History of loop recorder    Dysmenorrhea    Palpitations    Left-sided chest wall pain    Radioulnar synostosis of right upper extremity    Muscle pain       Objective:      Review of Systems   Constitutional: Positive for weight loss. Negative for malaise/fatigue.   Musculoskeletal:  Positive for myalgias. Negative for falls.   Neurological:  Positive for paresthesias. Negative for focal weakness and sensory change.   Psychiatric/Behavioral:  The patient is nervous/anxious.      Physical Exam  Vitals reviewed.   Constitutional:       General: She is not in acute distress.     Appearance: She is well-developed. She is morbidly obese.   Cardiovascular:      Pulses:           Dorsalis pedis pulses are 2+ on the right side and 2+ on the left side.   Musculoskeletal:         General: Tenderness present. No swelling or signs of injury.      Right foot: Deformity (high MLA B/L) present.      Left foot: Deformity present.   Feet:      Right foot:      Skin integrity: Skin integrity normal.      Left foot:      Skin integrity: Skin integrity normal.      Comments: Equinus B/L ankles w/ < 90 deg foot to leg noted w/ knees extended.      MS strength of extrinsics to foot & ankle B/L + 5/5 in DF/PF/Inv/Ev to resistance w/ no reproduction of pain in any direction.   Residual palpable fullness central B/L R > L heel insertion fascia into the calcaneus w/out radiation; no TTP along PTT.    Passive ROM of ankle & pedal joints is painless & w/out crepitation B/L.  Skin:     General: Skin is warm and dry.      Capillary Refill: Capillary refill takes less than 2 seconds.      Findings: No bruising, burn, erythema, signs of injury or wound.   Neurological:      Mental Status: She is alert and oriented to person, place, and time.      Sensory: Sensation is intact. No sensory deficit.      Motor: Motor function is intact. No weakness or abnormal muscle tone.      Gait: Gait is intact. Gait normal.   Psychiatric:         Mood and Affect: Affect normal. Mood is not anxious.         Behavior: Behavior normal. Behavior is cooperative.         Assessment:      Encounter Diagnoses   Name  Primary?    Burning sensation of feet Yes    Heel pain, unspecified laterality     Plantar fasciitis          Problem List Items Addressed This Visit    None  Visit Diagnoses       Burning sensation of feet    -  Primary    Heel pain, unspecified laterality        Plantar fasciitis              Plan:       Eliu was seen today for foot pain.    Diagnoses and all orders for this visit:    Burning sensation of feet    Heel pain, unspecified laterality    Plantar fasciitis    I counseled the patient on her conditions, their implications & medical mgmt.    Continue topical Voltaren gel up to qid until resolution of SX, then p.r.n.     F/U 3-4 wks.- patient states will call.         A total of 31 mins.was spent on chart review, patient visit & documentation.

## 2024-12-21 VITALS
RESPIRATION RATE: 16 BRPM | OXYGEN SATURATION: 98 % | BODY MASS INDEX: 46.26 KG/M2 | TEMPERATURE: 98 F | DIASTOLIC BLOOD PRESSURE: 91 MMHG | HEART RATE: 103 BPM | SYSTOLIC BLOOD PRESSURE: 125 MMHG | WEIGHT: 245 LBS | HEIGHT: 61 IN

## 2024-12-21 PROCEDURE — 99281 EMR DPT VST MAYX REQ PHY/QHP: CPT | Mod: ER

## 2024-12-22 ENCOUNTER — HOSPITAL ENCOUNTER (EMERGENCY)
Facility: HOSPITAL | Age: 24
Discharge: LEFT BEFORE DISCHARGE | End: 2024-12-22
Attending: INTERNAL MEDICINE
Payer: MEDICAID

## 2024-12-22 DIAGNOSIS — M25.571 RIGHT ANKLE PAIN: ICD-10-CM

## 2024-12-22 NOTE — ED PROVIDER NOTES
Encounter Date: 12/21/2024       History     Chief Complaint   Patient presents with    Vaginal Bleeding     Pt has been having heavy menstrual bleeding for 2 months. Unable to find an OB who takes her insurance. Pt recently had iron infusion and provider said her HGB was not low enough to receive blood. Pt c/o dizziness, blurred vision, and soaking two large pads in less than an hour.     Ankle Pain     Pt injured right ankle after falling from feeling dizzy     HPI  Review of patient's allergies indicates:   Allergen Reactions    Advair diskus [fluticasone propion-salmeterol] Anaphylaxis    Aspartame Anaphylaxis     Other reaction(s): UNKNOWN    Bupropion Anaphylaxis    Chloral hydrate analogues Other (See Comments)     Adverse reaction per grandma    Imitrex [sumatriptan succinate] Anaphylaxis    [place] Anaphylaxis    Metoprolol Shortness Of Breath    Nitroglycerin Anaphylaxis     Other reaction(s): stop breathing    Quetiapine Hallucinations     Other reaction(s): HALLUCINATION    Reglan [metoclopramide hcl] Shortness Of Breath    Tomato (solanum lycopersicum) Anaphylaxis and Shortness Of Breath    Unable to assess Anaphylaxis     Crabs/not allergic to iodine    Zantac [ranitidine hcl] Shortness Of Breath    Hibiclens (isopropyl alcohol) Itching    Abilify [aripiprazole]     Ambien [zolpidem]     Banana      Other reaction(s): STOP BREATHING    Chloral hydrate      Other reaction(s): adverse affect    Compazine [prochlorperazine edisylate]     Crab      Other reaction(s): STOP BREATHING    Desmopressin     Eggplant      Other reaction(s): EGG PLANT    Honey      Other reaction(s): LIP SWELLING    Ketorolac      Other reaction(s): RASH    Kiwi (actinidia chinensis)     Lexapro [escitalopram oxalate]     Meclizine      hives    Metoclopramide      Other reaction(s): SHORTNESS OF BREATH    Ondansetron      Other reaction(s): RASH    Prochlorperazine      Other reaction(s): RASH    Remeron [mirtazapine]     Sucralose       Other reaction(s): STOPS BREATHING    Tomato      Other reaction(s): stop breathing    Xanax [alprazolam]     Zofran [ondansetron hcl (pf)]     Mayonnaise Rash     Other reaction(s): STOP BREATHING    Sulfa (sulfonamide antibiotics) Rash and Nausea And Vomiting     Past Medical History:   Diagnosis Date    Asthma     Brain tumor, pineal gland     Depression     Endometriosis     Hypertension     Precocious puberty     Seizures     Syncope, cardiogenic     Thyroid disease      Past Surgical History:   Procedure Laterality Date     SECTION      COLONOSCOPY W/ BIOPSIES      ESOPHAGOGASTRODUODENOSCOPY      implantable cardiac monitor placement  2018    TONSILLECTOMY      TYMPANOSTOMY TUBE PLACEMENT       Family History   Problem Relation Name Age of Onset    Heart disease Mother      Hyperlipidemia Mother      Asthma Mother      Crohn's disease Mother  37        pending surgery. prior med; sulfasalazine    Depression Mother      Bipolar disorder Mother      Arthritis Mother      Anxiety disorder Mother      Other Mother          PTSD    Hypertension Maternal Grandmother      Asthma Maternal Grandmother      Depression Maternal Grandmother      Anxiety disorder Maternal Grandmother      Hypertension Paternal Grandmother      Diabetes Paternal Grandmother      Nephrolithiasis Father      Nephrolithiasis Brother      ADD / ADHD Brother      Depression Brother      Bipolar disorder Brother      Crohn's disease Maternal Grandfather      Alcohol abuse Paternal Grandfather      Breast cancer Neg Hx      Colon cancer Neg Hx      Ovarian cancer Neg Hx       Social History     Tobacco Use    Smoking status: Never     Passive exposure: Yes    Smokeless tobacco: Never   Substance Use Topics    Alcohol use: No    Drug use: No     Review of Systems    Physical Exam     Initial Vitals [24 2228]   BP Pulse Resp Temp SpO2   (!) 125/91 103 16 98.3 °F (36.8 °C) 98 %      MAP       --         Physical Exam    ED  Course   Procedures  Labs Reviewed - No data to display       Imaging Results    None          Medications - No data to display  Medical Decision Making                                    Clinical Impression:  Final diagnoses:  [M25.571] Right ankle pain          ED Disposition Condition    Andre Decker MD  12/22/24 0511

## 2025-07-25 ENCOUNTER — HOSPITAL ENCOUNTER (EMERGENCY)
Facility: HOSPITAL | Age: 25
Discharge: HOME | End: 2025-07-25
Attending: STUDENT IN AN ORGANIZED HEALTH CARE EDUCATION/TRAINING PROGRAM
Payer: MEDICAID

## 2025-07-25 VITALS
SYSTOLIC BLOOD PRESSURE: 139 MMHG | DIASTOLIC BLOOD PRESSURE: 77 MMHG | WEIGHT: 238 LBS | TEMPERATURE: 99 F | HEIGHT: 62 IN | RESPIRATION RATE: 18 BRPM | BODY MASS INDEX: 43.79 KG/M2 | HEART RATE: 84 BPM | OXYGEN SATURATION: 97 %

## 2025-07-25 DIAGNOSIS — R30.0 DYSURIA: ICD-10-CM

## 2025-07-25 DIAGNOSIS — N20.1 URETEROLITHIASIS: Primary | ICD-10-CM

## 2025-07-25 DIAGNOSIS — N20.0 KIDNEY STONE ON LEFT SIDE: ICD-10-CM

## 2025-07-25 LAB
ALBUMIN SERPL-MCNC: 3.5 G/DL (ref 3.3–5.5)
ALBUMIN SERPL-MCNC: 3.6 G/DL (ref 3.3–5.5)
ALP SERPL-CCNC: 52 U/L (ref 42–141)
ALP SERPL-CCNC: 59 U/L (ref 42–141)
B-HCG UR QL: NEGATIVE
BILIRUB SERPL-MCNC: 0.5 MG/DL (ref 0.2–1.6)
BILIRUB SERPL-MCNC: 0.5 MG/DL (ref 0.2–1.6)
BILIRUBIN, POC UA: ABNORMAL
BLOOD, POC UA: ABNORMAL
BUN SERPL-MCNC: 7 MG/DL (ref 7–22)
CALCIUM SERPL-MCNC: 9 MG/DL (ref 8–10.3)
CHLORIDE SERPL-SCNC: 106 MMOL/L (ref 98–108)
CLARITY, UA: ABNORMAL
COLOR, UA: ABNORMAL
CREAT SERPL-MCNC: 0.5 MG/DL (ref 0.6–1.2)
CTP QC/QA: YES
GLUCOSE SERPL-MCNC: 151 MG/DL (ref 73–118)
GLUCOSE, POC UA: NEGATIVE
HCT, POC: NORMAL
HGB, POC: NORMAL (ref 14–18)
KETONES, POC UA: ABNORMAL
LEUKOCYTE EST, POC UA: NEGATIVE
MCH, POC: NORMAL
MCHC, POC: NORMAL
MCV, POC: NORMAL
MPV, POC: NORMAL
NITRITE, POC UA: NEGATIVE
PH UR STRIP: 6 [PH] (ref 5–8)
POC ALT (SGPT): 46 U/L (ref 10–47)
POC ALT (SGPT): 49 U/L (ref 10–47)
POC AMYLASE: 51 U/L (ref 14–97)
POC AST (SGOT): 45 U/L (ref 11–38)
POC AST (SGOT): 45 U/L (ref 11–38)
POC GGT: 18 U/L (ref 5–65)
POC PLATELET COUNT: NORMAL
POC TCO2: 23 MMOL/L (ref 18–33)
POTASSIUM BLD-SCNC: 4.4 MMOL/L (ref 3.6–5.1)
PROTEIN, POC UA: ABNORMAL
PROTEIN, POC: 7.5 G/DL (ref 6.4–8.1)
PROTEIN, POC: 7.6 G/DL (ref 6.4–8.1)
RBC, POC: NORMAL
RDW, POC: NORMAL
SODIUM BLD-SCNC: 141 MMOL/L (ref 128–145)
SPECIFIC GRAVITY, POC UA: >=1.03 (ref 1–1.03)
UROBILINOGEN, POC UA: 0.2 E.U./DL
WBC, POC: NORMAL

## 2025-07-25 PROCEDURE — 96365 THER/PROPH/DIAG IV INF INIT: CPT | Mod: ER

## 2025-07-25 PROCEDURE — 96375 TX/PRO/DX INJ NEW DRUG ADDON: CPT | Mod: ER

## 2025-07-25 PROCEDURE — 80053 COMPREHEN METABOLIC PANEL: CPT | Mod: ER

## 2025-07-25 PROCEDURE — 82040 ASSAY OF SERUM ALBUMIN: CPT | Mod: 59,ER

## 2025-07-25 PROCEDURE — 82150 ASSAY OF AMYLASE: CPT | Mod: ER

## 2025-07-25 PROCEDURE — 87086 URINE CULTURE/COLONY COUNT: CPT | Performed by: STUDENT IN AN ORGANIZED HEALTH CARE EDUCATION/TRAINING PROGRAM

## 2025-07-25 PROCEDURE — 96361 HYDRATE IV INFUSION ADD-ON: CPT | Mod: ER

## 2025-07-25 PROCEDURE — 81025 URINE PREGNANCY TEST: CPT | Mod: ER

## 2025-07-25 PROCEDURE — 25000003 PHARM REV CODE 250: Mod: ER | Performed by: STUDENT IN AN ORGANIZED HEALTH CARE EDUCATION/TRAINING PROGRAM

## 2025-07-25 PROCEDURE — 85025 COMPLETE CBC W/AUTO DIFF WBC: CPT | Mod: ER

## 2025-07-25 PROCEDURE — 81025 URINE PREGNANCY TEST: CPT | Mod: ER | Performed by: STUDENT IN AN ORGANIZED HEALTH CARE EDUCATION/TRAINING PROGRAM

## 2025-07-25 PROCEDURE — 99285 EMERGENCY DEPT VISIT HI MDM: CPT | Mod: 25,ER

## 2025-07-25 PROCEDURE — 63600175 PHARM REV CODE 636 W HCPCS: Mod: ER | Performed by: STUDENT IN AN ORGANIZED HEALTH CARE EDUCATION/TRAINING PROGRAM

## 2025-07-25 RX ORDER — CEFTRIAXONE 2 G/1
2 INJECTION, POWDER, FOR SOLUTION INTRAMUSCULAR; INTRAVENOUS
Status: COMPLETED | OUTPATIENT
Start: 2025-07-25 | End: 2025-07-25

## 2025-07-25 RX ORDER — TAMSULOSIN HYDROCHLORIDE 0.4 MG/1
CAPSULE ORAL
Qty: 90 CAPSULE | Refills: 0 | Status: SHIPPED | OUTPATIENT
Start: 2025-07-25

## 2025-07-25 RX ORDER — PROMETHAZINE HYDROCHLORIDE 25 MG/1
25 TABLET ORAL EVERY 6 HOURS PRN
Qty: 15 TABLET | Refills: 0 | Status: SHIPPED | OUTPATIENT
Start: 2025-07-25

## 2025-07-25 RX ORDER — HYDROCODONE BITARTRATE AND ACETAMINOPHEN 5; 325 MG/1; MG/1
1 TABLET ORAL EVERY 6 HOURS PRN
Qty: 12 TABLET | Refills: 0 | Status: SHIPPED | OUTPATIENT
Start: 2025-07-25

## 2025-07-25 RX ORDER — CEPHALEXIN 500 MG/1
500 CAPSULE ORAL 4 TIMES DAILY
Qty: 40 CAPSULE | Refills: 0 | Status: SHIPPED | OUTPATIENT
Start: 2025-07-25 | End: 2025-08-04

## 2025-07-25 RX ORDER — MORPHINE SULFATE 4 MG/ML
4 INJECTION, SOLUTION INTRAMUSCULAR; INTRAVENOUS
Refills: 0 | Status: COMPLETED | OUTPATIENT
Start: 2025-07-25 | End: 2025-07-25

## 2025-07-25 RX ORDER — TAMSULOSIN HYDROCHLORIDE 0.4 MG/1
0.4 CAPSULE ORAL DAILY
Qty: 30 CAPSULE | Refills: 0 | Status: SHIPPED | OUTPATIENT
Start: 2025-07-25 | End: 2025-07-25

## 2025-07-25 RX ADMIN — SODIUM CHLORIDE 1000 ML: 9 INJECTION, SOLUTION INTRAVENOUS at 02:07

## 2025-07-25 RX ADMIN — PROMETHAZINE HYDROCHLORIDE 12.5 MG: 25 INJECTION INTRAMUSCULAR; INTRAVENOUS at 02:07

## 2025-07-25 RX ADMIN — MORPHINE SULFATE 4 MG: 4 INJECTION, SOLUTION INTRAMUSCULAR; INTRAVENOUS at 02:07

## 2025-07-25 RX ADMIN — CEFTRIAXONE SODIUM 2 G: 2 INJECTION, POWDER, FOR SOLUTION INTRAMUSCULAR; INTRAVENOUS at 04:07

## 2025-07-25 NOTE — ED PROVIDER NOTES
Encounter Date: 7/25/2025       History     Chief Complaint   Patient presents with    Back Pain     C/O LOWER BACK PAIN WITH HEMATURIA WORSENING TONIGHT     HPI    25-year-old female presenting to the emergency department for evaluation of left-sided flank pain radiating towards the lower abdomen with hematuria.  She notes intermittent pain.  She was seen on the 10th of July at [facility] with a similar pain and she believes she has past multiple kidney stones of the last few days.  She notes the pain significantly worsened tonight.  She notes subjective fever at home.  She took Tylenol prior to arrival.  She noted nausea and vomiting.  She notes dysuria after passing stones.  Recently she took out her NuvaRing due to vaginal swelling and discomfort which has improved.  She denies chest pain, shortness for breath, cough, headache.  No other mitigating or exacerbating factors.  Review of patient's allergies indicates:   Allergen Reactions    Advair diskus [fluticasone propion-salmeterol] Anaphylaxis    Aspartame Anaphylaxis     Other reaction(s): UNKNOWN    Bupropion Anaphylaxis    Chloral hydrate analogues Other (See Comments)     Adverse reaction per grandma    Imitrex [sumatriptan succinate] Anaphylaxis    [place] Anaphylaxis    Metoprolol Shortness Of Breath    Nitroglycerin Anaphylaxis     Other reaction(s): stop breathing    Quetiapine Hallucinations     Other reaction(s): HALLUCINATION    Reglan [metoclopramide hcl] Shortness Of Breath    Tomato (solanum lycopersicum) Anaphylaxis and Shortness Of Breath    Unable to assess Anaphylaxis     Crabs/not allergic to iodine    Zantac [ranitidine hcl] Shortness Of Breath    Hibiclens (isopropyl alcohol) Itching    Abilify [aripiprazole]     Ambien [zolpidem]     Banana      Other reaction(s): STOP BREATHING    Chloral hydrate      Other reaction(s): adverse affect    Compazine [prochlorperazine edisylate]     Crab      Other reaction(s): STOP BREATHING    Desmopressin      Eggplant      Other reaction(s): EGG PLANT    Honey      Other reaction(s): LIP SWELLING    Ketorolac      Other reaction(s): RASH    Kiwi (actinidia chinensis)     Lexapro [escitalopram oxalate]     Meclizine      hives    Metoclopramide      Other reaction(s): SHORTNESS OF BREATH    Ondansetron      Other reaction(s): RASH    Prochlorperazine      Other reaction(s): RASH    Remeron [mirtazapine]     Sucralose      Other reaction(s): STOPS BREATHING    Tomato      Other reaction(s): stop breathing    Xanax [alprazolam]     Zofran [ondansetron hcl (pf)]     Mayonnaise Rash     Other reaction(s): STOP BREATHING    Sulfa (sulfonamide antibiotics) Rash and Nausea And Vomiting     Past Medical History:   Diagnosis Date    Asthma     Brain tumor, pineal gland     Depression     Endometriosis     Hypertension     Precocious puberty     Seizures     Syncope, cardiogenic     Thyroid disease      Past Surgical History:   Procedure Laterality Date     SECTION      COLONOSCOPY W/ BIOPSIES      ESOPHAGOGASTRODUODENOSCOPY      implantable cardiac monitor placement  2018    TONSILLECTOMY      TYMPANOSTOMY TUBE PLACEMENT       Family History   Problem Relation Name Age of Onset    Heart disease Mother      Hyperlipidemia Mother      Asthma Mother      Crohn's disease Mother  37        pending surgery. prior med; sulfasalazine    Depression Mother      Bipolar disorder Mother      Arthritis Mother      Anxiety disorder Mother      Other Mother          PTSD    Hypertension Maternal Grandmother      Asthma Maternal Grandmother      Depression Maternal Grandmother      Anxiety disorder Maternal Grandmother      Hypertension Paternal Grandmother      Diabetes Paternal Grandmother      Nephrolithiasis Father      Nephrolithiasis Brother      ADD / ADHD Brother      Depression Brother      Bipolar disorder Brother      Crohn's disease Maternal Grandfather      Alcohol abuse Paternal Grandfather      Breast cancer Neg Hx       Colon cancer Neg Hx      Ovarian cancer Neg Hx       Social History[1]  Review of Systems  See HP  Physical Exam     Initial Vitals [07/25/25 0201]   BP Pulse Resp Temp SpO2   (!) 148/114 (!) 112 20 98.3 °F (36.8 °C) 98 %      MAP       --         Physical Exam    Nursing note and vitals reviewed.  Constitutional: She appears well-developed and well-nourished. She is not diaphoretic. No distress.   HENT:   Head: Normocephalic and atraumatic.   Right Ear: External ear normal.   Left Ear: External ear normal.   Nose: Nose normal.   Eyes: Conjunctivae are normal. No scleral icterus.   Neck: Neck supple. No tracheal deviation present.   Normal range of motion.  Cardiovascular:  Normal rate, regular rhythm, normal heart sounds and intact distal pulses.     Exam reveals no gallop and no friction rub.       No murmur heard.  Pulmonary/Chest: Breath sounds normal. No respiratory distress.   Abdominal: Abdomen is soft. Bowel sounds are normal. There is abdominal tenderness.   Mild tenderness to palpation left lower quadrant.  Left-sided CVAT.  No right-sided CVAT. There is no rebound and no guarding.   Musculoskeletal:      Cervical back: Normal range of motion and neck supple.     Neurological: She is alert and oriented to person, place, and time. GCS score is 15. GCS eye subscore is 4. GCS verbal subscore is 5. GCS motor subscore is 6.   Skin: Skin is warm and dry.   No jaundice   Psychiatric: She has a normal mood and affect. Thought content normal.         ED Course   Procedures  Labs Reviewed   POCT URINALYSIS W/O SCOPE - Abnormal       Result Value    Glucose, UA Negative      Bilirubin, UA 1+ (*)     Ketones, UA Trace (*)     Spec Grav UA >=1.030 (*)     Blood, UA 3+ (*)     PH, UA 6.0      Protein, UA 2+ (*)     Urobilinogen, UA 0.2      Nitrite, UA Negative      Leukocytes, UA Negative      Color, UA POC Cassandra      Clarity, UA, POC Cloudy     POCT CMP - Abnormal    Albumin, POC 3.5      Alkaline Phosphatase,  POC 59      ALT (SGPT), POC 49 (*)     AST (SGOT), POC 45 (*)     POC BUN 7      Calcium, POC 9.0      POC Chloride 106      POC Creatinine 0.5 (*)     POC Glucose 151 (*)     POC Potassium 4.4      POC Sodium 141      Bilirubin, POC 0.5      POC TCO2 23      Protein, POC 7.5     POCT LIVER PANEL - Abnormal    Albumin, POC 3.6      Alkaline Phosphatase, POC 52      ALT (SGPT), POC 46      Amylase, POC 51      AST (SGOT), POC 45 (*)     POC GGT 18      Bilirubin, POC 0.5      Protein, POC 7.6     CULTURE, URINE   POCT CBC    Hematocrit        Hemoglobin        RBC        WBC        MCV        MCH, POC        MCHC        RDW-CV        Platelet Count, POC        MPV       POCT URINE PREGNANCY    POC Preg Test, Ur Negative       Acceptable Yes     POCT LIVER PANEL   POCT CMP   POCT URINALYSIS W/O SCOPE          Imaging Results              CT Renal Stone Study ABD Pelvis WO (Final result)  Result time 07/25/25 04:24:45   Procedure changed from CT Abdomen Pelvis  Without Contrast     Final result by El Hernández MD (07/25/25 04:24:45)                   Impression:    IMPRESSION:  Mild left hydronephrosis and hydroureter secondary to a 3 mm distal left ureteral calculus.    -Electronically Signed By: El Hernández MD   -Electronically Signed On:  7/25/2025 4:24 AM      Report Ends               Narrative:    EXAM: CT abdomen and pelvis.    HISTORY: Abdominal pain.    TECHNIQUE: Multiple axial CT images of the abdomen and pelvis were obtained without the administration of contrast material. Reformatted sagittal and coronal images were also provided. Images were obtained utilizing ALARA principles.    COMPARISON: None.    FINDINGS:    CT ABDOMEN: The lung bases are clear. The liver, spleen, adrenal glands, and pancreas are unremarkable. Mild left hydronephrosis and hydroureter secondary to a 3 mm distal left ureteral calculus. No hydronephrosis is seen on the right. No other ureteral calculus is seen. No  free fluid or adenopathy seen in the abdomen. No free air identified. The small bowel is unremarkable without evidence of mechanical obstruction. The appendix is normal. The colon is unremarkable without evidence of wall thickening or other abnormality. No pneumatosis or portal venous gas seen.      CT PELVIS: The urinary bladder, rectum, and perirectal soft tissues are unremarkable. No significant free pelvic fluid or adenopathy seen. No acute osseous process identified.                                       Medications   cefTRIAXone injection 2 g (has no administration in time range)   sodium chloride 0.9% bolus 1,000 mL 1,000 mL (0 mLs Intravenous Stopped 7/25/25 0358)   morphine injection 4 mg (4 mg Intravenous Given 7/25/25 0242)   promethazine 12.5 mg in 0.9% NaCl 50 mL IVPB (0 mg Intravenous Stopped 7/25/25 0300)     Medical Decision Making  Amount and/or Complexity of Data Reviewed  Labs: ordered. Decision-making details documented in ED Course.  Radiology: ordered. Decision-making details documented in ED Course.    Risk  Prescription drug management.               ED Course as of 07/25/25 0440   Fri Jul 25, 2025   0246 POCT CBC  WBC 10.6, hemoglobin 13.8, hematocrit 39.9, platelet count 282. [CC]   0427 CT Renal Stone Study ABD Pelvis WO [CC]   0428 CT Renal Stone Study ABD Pelvis WO  IMPRESSION:  Mild left hydronephrosis and hydroureter secondary to a 3 mm distal left ureteral calculus.     -Electronically Signed By: El Hernández MD   -Electronically Signed On:  7/25/2025 4:24 AM   [CC]   3470 MDM: 25-year-old presenting to emergency department for evaluation of left-sided flank pain.  3 mm stone noted in the distal left ureter.  Hydronephrosis noted.  UA here and not indicative of UTI although given dysuria and reported fever we will treat with the antibiotics and have follow up with the Urology.  AST mildly elevated but otherwise liver function panel unremarkable.  Kidney function within normal limits.   CBC with a mildly elevated white count but no significant anemia, platelets within normal limits.  Tachycardic on arrival in the setting of pain.  Improved after morphine.  Promethazine for nausea.  She received fluids.  She notes improvement.  She is unable to receive NSAIDs she notes.  Plan to discharge with a urologic follow up.  I do not believe this to be a septic stone.  Dose of Rocephin here and we will start patient on Keflex in the outpatient setting.  Starting on Flomax to aid in expulsion.  Norco for pain.  Otherwise patient looks well and plan for discharge.  All Ed testing reviewed/ and pertinent results discussed with patient.  Strict return precautions given. I believe patient is appropriate for discharge and continued outpatient evaulation/treatment.  I discussed with the patient/family the diagnosis, treatment plan, indications for return to the emergency department, and for expected follow-up. The patient/family verbalized an understanding. The patient/family  asked if there are any questions or concerns. We discuss the case, until all issues are addressed to the patient/family's satisfaction. Patient/family understands and is agreeable to the plan. Patient is stable and ready for discharge.   [CC]   0440 Urology referral placed.  Patient given urine strainer. [CC]      ED Course User Index  [CC] Mehdi Chavez MD                               Clinical Impression:  Final diagnoses:  [N20.1] Ureterolithiasis (Primary)  [N20.0] Kidney stone on left side  [R30.0] Dysuria          ED Disposition Condition    Discharge Stable          ED Prescriptions       Medication Sig Dispense Start Date End Date Auth. Provider    cephALEXin (KEFLEX) 500 MG capsule Take 1 capsule (500 mg total) by mouth 4 (four) times daily. for 10 days 40 capsule 7/25/2025 8/4/2025 Mehdi Chavez MD    tamsulosin (FLOMAX) 0.4 mg Cap Take 1 capsule (0.4 mg total) by mouth once daily. Take daily until you passed  kidney stone then discontinue. 30 capsule 7/25/2025 8/24/2025 Mehdi Chavez MD    HYDROcodone-acetaminophen (NORCO) 5-325 mg per tablet Take 1 tablet by mouth every 6 (six) hours as needed for Pain. 12 tablet 7/25/2025 -- Mehdi Chavez MD          Follow-up Information    None                [1]   Social History  Tobacco Use    Smoking status: Never     Passive exposure: Yes    Smokeless tobacco: Never   Vaping Use    Vaping status: Never Used   Substance Use Topics    Alcohol use: No    Drug use: No        Mehdi Chavez MD  07/25/25 0064

## 2025-07-27 LAB — BACTERIA UR CULT: NORMAL

## 2025-07-28 ENCOUNTER — TELEPHONE (OUTPATIENT)
Facility: OTHER | Age: 25
End: 2025-07-28
Payer: MEDICAID

## 2025-07-28 NOTE — PROGRESS NOTES
Pt had question on how long the kidney stones would last. Information provided. Pt verbalized understanding.    
normal

## (undated) DEVICE — PAD ABD 8X10 STERILE